# Patient Record
Sex: MALE | Employment: FULL TIME | URBAN - NONMETROPOLITAN AREA
[De-identification: names, ages, dates, MRNs, and addresses within clinical notes are randomized per-mention and may not be internally consistent; named-entity substitution may affect disease eponyms.]

---

## 2017-01-09 ENCOUNTER — OFFICE VISIT (OUTPATIENT)
Dept: PRIMARY CARE CLINIC | Age: 42
End: 2017-01-09
Payer: COMMERCIAL

## 2017-01-09 VITALS
OXYGEN SATURATION: 96 % | HEIGHT: 73 IN | BODY MASS INDEX: 41.75 KG/M2 | HEART RATE: 97 BPM | TEMPERATURE: 99.5 F | SYSTOLIC BLOOD PRESSURE: 132 MMHG | WEIGHT: 315 LBS | DIASTOLIC BLOOD PRESSURE: 78 MMHG

## 2017-01-09 DIAGNOSIS — M15.9 PRIMARY OSTEOARTHRITIS INVOLVING MULTIPLE JOINTS: ICD-10-CM

## 2017-01-09 DIAGNOSIS — J01.00 ACUTE NON-RECURRENT MAXILLARY SINUSITIS: ICD-10-CM

## 2017-01-09 DIAGNOSIS — I10 ESSENTIAL HYPERTENSION: Primary | ICD-10-CM

## 2017-01-09 DIAGNOSIS — R73.9 HYPERGLYCEMIA: ICD-10-CM

## 2017-01-09 DIAGNOSIS — Z23 NEED FOR INFLUENZA VACCINATION: ICD-10-CM

## 2017-01-09 PROCEDURE — 99214 OFFICE O/P EST MOD 30 MIN: CPT | Performed by: PEDIATRICS

## 2017-01-09 RX ORDER — NABUMETONE 750 MG/1
750 TABLET, FILM COATED ORAL 2 TIMES DAILY
Qty: 60 TABLET | Refills: 3 | Status: SHIPPED | OUTPATIENT
Start: 2017-01-09 | End: 2017-03-07

## 2017-01-09 RX ORDER — AMOXICILLIN AND CLAVULANATE POTASSIUM 875; 125 MG/1; MG/1
1 TABLET, FILM COATED ORAL EVERY 12 HOURS
Qty: 20 TABLET | Refills: 0 | Status: SHIPPED | OUTPATIENT
Start: 2017-01-09 | End: 2017-01-19

## 2017-01-09 ASSESSMENT — ENCOUNTER SYMPTOMS
VOMITING: 0
VOICE CHANGE: 0
SINUS PRESSURE: 1
RHINORRHEA: 1
DIARRHEA: 0
NAUSEA: 0
SHORTNESS OF BREATH: 0
ABDOMINAL PAIN: 0
COUGH: 1
BACK PAIN: 0

## 2017-03-07 ENCOUNTER — OFFICE VISIT (OUTPATIENT)
Dept: PRIMARY CARE CLINIC | Age: 42
End: 2017-03-07
Payer: COMMERCIAL

## 2017-03-07 VITALS
WEIGHT: 315 LBS | BODY MASS INDEX: 41.75 KG/M2 | SYSTOLIC BLOOD PRESSURE: 138 MMHG | TEMPERATURE: 99.3 F | HEIGHT: 73 IN | HEART RATE: 96 BPM | OXYGEN SATURATION: 94 % | DIASTOLIC BLOOD PRESSURE: 78 MMHG

## 2017-03-07 DIAGNOSIS — M17.11 PRIMARY OSTEOARTHRITIS OF RIGHT KNEE: ICD-10-CM

## 2017-03-07 DIAGNOSIS — R73.02 IMPAIRED GLUCOSE TOLERANCE: ICD-10-CM

## 2017-03-07 DIAGNOSIS — I10 ESSENTIAL HYPERTENSION: ICD-10-CM

## 2017-03-07 DIAGNOSIS — M17.12 PRIMARY OSTEOARTHRITIS OF LEFT KNEE: Primary | ICD-10-CM

## 2017-03-07 DIAGNOSIS — Z23 NEED FOR INFLUENZA VACCINATION: ICD-10-CM

## 2017-03-07 PROCEDURE — 20610 DRAIN/INJ JOINT/BURSA W/O US: CPT | Performed by: PEDIATRICS

## 2017-03-07 PROCEDURE — 99213 OFFICE O/P EST LOW 20 MIN: CPT | Performed by: PEDIATRICS

## 2017-03-07 RX ORDER — LANOLIN ALCOHOL/MO/W.PET/CERES
1000 CREAM (GRAM) TOPICAL DAILY
COMMUNITY
End: 2017-06-26

## 2017-03-07 RX ORDER — TRIAMCINOLONE ACETONIDE 40 MG/ML
40 INJECTION, SUSPENSION INTRA-ARTICULAR; INTRAMUSCULAR ONCE
Qty: 1 ML | Refills: 0
Start: 2017-03-07 | End: 2017-03-07

## 2017-03-07 RX ORDER — PREDNISONE 10 MG/1
TABLET ORAL
Qty: 43 TABLET | Refills: 0 | Status: SHIPPED | OUTPATIENT
Start: 2017-03-07 | End: 2017-06-26 | Stop reason: ALTCHOICE

## 2017-03-07 RX ORDER — MULTIVIT WITH MINERALS/LUTEIN
250 TABLET ORAL DAILY
COMMUNITY
End: 2017-06-26

## 2017-03-07 RX ORDER — MELOXICAM 15 MG/1
15 TABLET ORAL DAILY
COMMUNITY
End: 2017-09-18 | Stop reason: SDUPTHER

## 2017-03-07 RX ORDER — METHYLPREDNISOLONE ACETATE 80 MG/ML
80 INJECTION, SUSPENSION INTRA-ARTICULAR; INTRALESIONAL; INTRAMUSCULAR; SOFT TISSUE ONCE
Qty: 1 ML | Refills: 0
Start: 2017-03-07 | End: 2017-03-07

## 2017-03-07 ASSESSMENT — ENCOUNTER SYMPTOMS
CHEST TIGHTNESS: 0
COUGH: 0
ABDOMINAL PAIN: 0
BACK PAIN: 0
VOMITING: 0
SINUS PRESSURE: 0
WHEEZING: 0
SHORTNESS OF BREATH: 0
SORE THROAT: 0
DIARRHEA: 0
VOICE CHANGE: 0
NAUSEA: 0
TROUBLE SWALLOWING: 0
CHOKING: 0
CONSTIPATION: 0
ABDOMINAL DISTENTION: 0

## 2017-06-26 ENCOUNTER — OFFICE VISIT (OUTPATIENT)
Dept: PRIMARY CARE CLINIC | Age: 42
End: 2017-06-26
Payer: COMMERCIAL

## 2017-06-26 VITALS
HEART RATE: 80 BPM | SYSTOLIC BLOOD PRESSURE: 124 MMHG | BODY MASS INDEX: 41.75 KG/M2 | HEIGHT: 73 IN | TEMPERATURE: 100.2 F | DIASTOLIC BLOOD PRESSURE: 76 MMHG | OXYGEN SATURATION: 93 % | WEIGHT: 315 LBS

## 2017-06-26 DIAGNOSIS — M17.12 PRIMARY OSTEOARTHRITIS OF LEFT KNEE: ICD-10-CM

## 2017-06-26 DIAGNOSIS — Z23 NEED FOR DTAP VACCINATION: ICD-10-CM

## 2017-06-26 DIAGNOSIS — E78.2 MIXED HYPERLIPIDEMIA: ICD-10-CM

## 2017-06-26 DIAGNOSIS — M17.11 PRIMARY OSTEOARTHRITIS OF RIGHT KNEE: ICD-10-CM

## 2017-06-26 DIAGNOSIS — R73.02 IMPAIRED GLUCOSE TOLERANCE: ICD-10-CM

## 2017-06-26 DIAGNOSIS — Z11.4 SCREENING FOR HIV (HUMAN IMMUNODEFICIENCY VIRUS): ICD-10-CM

## 2017-06-26 DIAGNOSIS — I10 ESSENTIAL HYPERTENSION: Primary | ICD-10-CM

## 2017-06-26 PROCEDURE — 20610 DRAIN/INJ JOINT/BURSA W/O US: CPT | Performed by: PEDIATRICS

## 2017-06-26 PROCEDURE — 99214 OFFICE O/P EST MOD 30 MIN: CPT | Performed by: PEDIATRICS

## 2017-06-26 RX ORDER — TRIAMCINOLONE ACETONIDE 40 MG/ML
40 INJECTION, SUSPENSION INTRA-ARTICULAR; INTRAMUSCULAR ONCE
Qty: 1 ML | Refills: 0
Start: 2017-06-26 | End: 2017-06-26

## 2017-06-26 RX ORDER — MULTIVIT-MIN/IRON/FOLIC ACID/K 18-600-40
1 CAPSULE ORAL DAILY
COMMUNITY
End: 2018-08-07

## 2017-06-26 RX ORDER — METHYLPREDNISOLONE ACETATE 80 MG/ML
80 INJECTION, SUSPENSION INTRA-ARTICULAR; INTRALESIONAL; INTRAMUSCULAR; SOFT TISSUE ONCE
Qty: 1 ML | Refills: 0
Start: 2017-06-26 | End: 2017-06-26

## 2017-06-26 RX ORDER — MODAFINIL 200 MG/1
200 TABLET ORAL DAILY
COMMUNITY
End: 2017-09-18

## 2017-06-26 RX ORDER — ZINC GLUCONATE 50 MG
50 TABLET ORAL DAILY
COMMUNITY
End: 2018-08-07

## 2017-06-26 ASSESSMENT — ENCOUNTER SYMPTOMS
SORE THROAT: 0
BACK PAIN: 0
VOMITING: 0
CONSTIPATION: 0
EYE PAIN: 0
COUGH: 0
BLOOD IN STOOL: 0
SHORTNESS OF BREATH: 0
WHEEZING: 0
EYE REDNESS: 0
SINUS PRESSURE: 0
EYE ITCHING: 0
DIARRHEA: 0
EYE DISCHARGE: 0
NAUSEA: 0
CHEST TIGHTNESS: 0
ABDOMINAL PAIN: 0
TROUBLE SWALLOWING: 0

## 2017-08-11 DIAGNOSIS — I10 ESSENTIAL HYPERTENSION: ICD-10-CM

## 2017-08-11 RX ORDER — AMLODIPINE BESYLATE 5 MG/1
5 TABLET ORAL DAILY
Qty: 90 TABLET | Refills: 3 | Status: SHIPPED | OUTPATIENT
Start: 2017-08-11 | End: 2018-10-09 | Stop reason: SDUPTHER

## 2017-08-19 DIAGNOSIS — E78.2 MIXED HYPERLIPIDEMIA: ICD-10-CM

## 2017-08-21 RX ORDER — ATORVASTATIN CALCIUM 40 MG/1
40 TABLET, FILM COATED ORAL DAILY
Qty: 30 TABLET | Refills: 11 | Status: SHIPPED | OUTPATIENT
Start: 2017-08-21 | End: 2018-10-09 | Stop reason: SDUPTHER

## 2017-09-18 ENCOUNTER — OFFICE VISIT (OUTPATIENT)
Dept: PRIMARY CARE CLINIC | Age: 42
End: 2017-09-18
Payer: COMMERCIAL

## 2017-09-18 VITALS
BODY MASS INDEX: 41.75 KG/M2 | HEIGHT: 73 IN | TEMPERATURE: 100 F | HEART RATE: 76 BPM | OXYGEN SATURATION: 95 % | WEIGHT: 315 LBS | SYSTOLIC BLOOD PRESSURE: 130 MMHG | DIASTOLIC BLOOD PRESSURE: 82 MMHG

## 2017-09-18 DIAGNOSIS — I10 ESSENTIAL HYPERTENSION: Primary | ICD-10-CM

## 2017-09-18 DIAGNOSIS — M17.12 PRIMARY OSTEOARTHRITIS OF LEFT KNEE: ICD-10-CM

## 2017-09-18 DIAGNOSIS — E78.2 MIXED HYPERLIPIDEMIA: ICD-10-CM

## 2017-09-18 DIAGNOSIS — R53.83 FATIGUE, UNSPECIFIED TYPE: ICD-10-CM

## 2017-09-18 DIAGNOSIS — M15.9 PRIMARY OSTEOARTHRITIS INVOLVING MULTIPLE JOINTS: ICD-10-CM

## 2017-09-18 DIAGNOSIS — I10 ESSENTIAL HYPERTENSION: ICD-10-CM

## 2017-09-18 DIAGNOSIS — M17.11 PRIMARY OSTEOARTHRITIS OF RIGHT KNEE: ICD-10-CM

## 2017-09-18 LAB
ALBUMIN SERPL-MCNC: 4.1 G/DL (ref 3.5–5.2)
ALP BLD-CCNC: 141 U/L (ref 40–130)
ALT SERPL-CCNC: 24 U/L (ref 5–41)
ANION GAP SERPL CALCULATED.3IONS-SCNC: 18 MMOL/L (ref 7–19)
AST SERPL-CCNC: 26 U/L (ref 5–40)
BASOPHILS ABSOLUTE: 0.1 K/UL (ref 0–0.2)
BASOPHILS RELATIVE PERCENT: 0.8 % (ref 0–1)
BILIRUB SERPL-MCNC: 0.5 MG/DL (ref 0.2–1.2)
BUN BLDV-MCNC: 14 MG/DL (ref 6–20)
CALCIUM SERPL-MCNC: 10 MG/DL (ref 8.6–10)
CHLORIDE BLD-SCNC: 99 MMOL/L (ref 98–111)
CHOLESTEROL, TOTAL: 132 MG/DL (ref 160–199)
CO2: 23 MMOL/L (ref 22–29)
CREAT SERPL-MCNC: 0.7 MG/DL (ref 0.5–1.2)
CREATININE URINE: 127.5 MG/DL (ref 4.2–622)
EOSINOPHILS ABSOLUTE: 0.2 K/UL (ref 0–0.6)
EOSINOPHILS RELATIVE PERCENT: 1.5 % (ref 0–5)
GFR NON-AFRICAN AMERICAN: >60
GLUCOSE BLD-MCNC: 88 MG/DL (ref 74–109)
HCT VFR BLD CALC: 46 % (ref 42–52)
HDLC SERPL-MCNC: 38 MG/DL (ref 55–121)
HEMOGLOBIN: 15 G/DL (ref 14–18)
LDL CHOLESTEROL CALCULATED: 72 MG/DL
LYMPHOCYTES ABSOLUTE: 2.2 K/UL (ref 1.1–4.5)
LYMPHOCYTES RELATIVE PERCENT: 20.1 % (ref 20–40)
MCH RBC QN AUTO: 31.4 PG (ref 27–31)
MCHC RBC AUTO-ENTMCNC: 32.6 G/DL (ref 33–37)
MCV RBC AUTO: 96.4 FL (ref 80–94)
MICROALBUMIN UR-MCNC: 2 MG/DL (ref 0–19)
MICROALBUMIN/CREAT UR-RTO: 15.7 MG/G
MONOCYTES ABSOLUTE: 0.8 K/UL (ref 0–0.9)
MONOCYTES RELATIVE PERCENT: 7.1 % (ref 0–10)
NEUTROPHILS ABSOLUTE: 7.6 K/UL (ref 1.5–7.5)
NEUTROPHILS RELATIVE PERCENT: 69.5 % (ref 50–65)
PDW BLD-RTO: 14.5 % (ref 11.5–14.5)
PLATELET # BLD: 346 K/UL (ref 130–400)
PMV BLD AUTO: 9.7 FL (ref 9.4–12.4)
POTASSIUM SERPL-SCNC: 4.1 MMOL/L (ref 3.5–5)
RBC # BLD: 4.77 M/UL (ref 4.7–6.1)
SODIUM BLD-SCNC: 140 MMOL/L (ref 136–145)
T4 FREE: 1.3 NG/DL (ref 0.9–1.7)
TOTAL PROTEIN: 7.7 G/DL (ref 6.6–8.7)
TRIGL SERPL-MCNC: 112 MG/DL (ref 150–199)
TSH SERPL DL<=0.05 MIU/L-ACNC: 0.82 UIU/ML (ref 0.27–4.2)
WBC # BLD: 11 K/UL (ref 4.8–10.8)

## 2017-09-18 PROCEDURE — 20610 DRAIN/INJ JOINT/BURSA W/O US: CPT | Performed by: PEDIATRICS

## 2017-09-18 PROCEDURE — 99213 OFFICE O/P EST LOW 20 MIN: CPT | Performed by: PEDIATRICS

## 2017-09-18 RX ORDER — MELOXICAM 15 MG/1
15 TABLET ORAL DAILY
Qty: 90 TABLET | Refills: 3 | Status: SHIPPED | OUTPATIENT
Start: 2017-09-18 | End: 2018-10-09 | Stop reason: SDUPTHER

## 2017-09-18 RX ORDER — METHYLPREDNISOLONE ACETATE 80 MG/ML
80 INJECTION, SUSPENSION INTRA-ARTICULAR; INTRALESIONAL; INTRAMUSCULAR; SOFT TISSUE ONCE
Qty: 1 ML | Refills: 0
Start: 2017-09-18 | End: 2017-09-18

## 2017-09-18 RX ORDER — TRIAMCINOLONE ACETONIDE 40 MG/ML
40 INJECTION, SUSPENSION INTRA-ARTICULAR; INTRAMUSCULAR ONCE
Qty: 1 ML | Refills: 0
Start: 2017-09-18 | End: 2017-09-18

## 2017-09-18 RX ORDER — LISINOPRIL AND HYDROCHLOROTHIAZIDE 20; 12.5 MG/1; MG/1
1 TABLET ORAL 2 TIMES DAILY
Qty: 180 TABLET | Refills: 3 | Status: SHIPPED | OUTPATIENT
Start: 2017-09-18 | End: 2018-10-09 | Stop reason: SDUPTHER

## 2017-09-18 ASSESSMENT — ENCOUNTER SYMPTOMS
EYE PAIN: 0
VOMITING: 0
DIARRHEA: 0
WHEEZING: 0
COUGH: 0
CHEST TIGHTNESS: 0
SINUS PRESSURE: 0
EYE DISCHARGE: 0
BLOOD IN STOOL: 0
EYE REDNESS: 0
NAUSEA: 0
EYE ITCHING: 0
BACK PAIN: 0
CONSTIPATION: 0
TROUBLE SWALLOWING: 0
ABDOMINAL PAIN: 0
SORE THROAT: 0
SHORTNESS OF BREATH: 0

## 2017-09-19 ENCOUNTER — TELEPHONE (OUTPATIENT)
Dept: PRIMARY CARE CLINIC | Age: 42
End: 2017-09-19

## 2017-12-18 ENCOUNTER — OFFICE VISIT (OUTPATIENT)
Dept: PRIMARY CARE CLINIC | Age: 42
End: 2017-12-18
Payer: COMMERCIAL

## 2017-12-18 VITALS
TEMPERATURE: 96.2 F | BODY MASS INDEX: 41.75 KG/M2 | SYSTOLIC BLOOD PRESSURE: 134 MMHG | HEIGHT: 73 IN | HEART RATE: 78 BPM | OXYGEN SATURATION: 95 % | WEIGHT: 315 LBS | DIASTOLIC BLOOD PRESSURE: 78 MMHG

## 2017-12-18 DIAGNOSIS — M17.12 PRIMARY OSTEOARTHRITIS OF LEFT KNEE: ICD-10-CM

## 2017-12-18 DIAGNOSIS — M17.11 PRIMARY OSTEOARTHRITIS OF RIGHT KNEE: Primary | ICD-10-CM

## 2017-12-18 PROCEDURE — G8417 CALC BMI ABV UP PARAM F/U: HCPCS | Performed by: PEDIATRICS

## 2017-12-18 PROCEDURE — G8484 FLU IMMUNIZE NO ADMIN: HCPCS | Performed by: PEDIATRICS

## 2017-12-18 PROCEDURE — 99213 OFFICE O/P EST LOW 20 MIN: CPT | Performed by: PEDIATRICS

## 2017-12-18 PROCEDURE — 20610 DRAIN/INJ JOINT/BURSA W/O US: CPT | Performed by: PEDIATRICS

## 2017-12-18 PROCEDURE — 1036F TOBACCO NON-USER: CPT | Performed by: PEDIATRICS

## 2017-12-18 PROCEDURE — G8427 DOCREV CUR MEDS BY ELIG CLIN: HCPCS | Performed by: PEDIATRICS

## 2017-12-18 RX ORDER — METHYLPREDNISOLONE ACETATE 80 MG/ML
80 INJECTION, SUSPENSION INTRA-ARTICULAR; INTRALESIONAL; INTRAMUSCULAR; SOFT TISSUE ONCE
Qty: 1 ML | Refills: 0
Start: 2017-12-18 | End: 2017-12-18

## 2017-12-18 RX ORDER — TRIAMCINOLONE ACETONIDE 40 MG/ML
40 INJECTION, SUSPENSION INTRA-ARTICULAR; INTRAMUSCULAR ONCE
Qty: 1 ML | Refills: 0
Start: 2017-12-18 | End: 2017-12-18

## 2017-12-18 ASSESSMENT — ENCOUNTER SYMPTOMS
BACK PAIN: 0
CHEST TIGHTNESS: 0
WHEEZING: 0
DIARRHEA: 0
ABDOMINAL PAIN: 0
COUGH: 0
SHORTNESS OF BREATH: 0
NAUSEA: 0
SORE THROAT: 0
VOMITING: 0

## 2017-12-18 NOTE — PROGRESS NOTES
Take 1 capsule by mouth daily      CPAP Machine MISC by Does not apply route nightly      magnesium oxide (MAG-OX) 400 (240 MG) MG tablet Take 1 tablet by mouth daily 30 tablet 11    triamcinolone (KENALOG) 0.1 % cream Apply topically 2 times daily. 80 g 5     No current facility-administered medications for this visit. Allergies: Review of patient's allergies indicates no known allergies. Past Medical History:   Diagnosis Date    ED (erectile dysfunction)     Fatigue     Hyperlipidemia     Hypertension     Obesity     Osteoarthrosis     Snoring        Past Surgical History:   Procedure Laterality Date    ARM SURGERY Left     broken     KNEE SURGERY      Scope     VASECTOMY         Social History   Substance Use Topics    Smoking status: Former Smoker    Smokeless tobacco: Former User    Alcohol use No       Review of Systems   Constitutional: Negative for chills, fatigue and fever. HENT: Negative for congestion, ear pain and sore throat. Eyes: Negative for visual disturbance. Respiratory: Negative for cough, chest tightness, shortness of breath and wheezing. Cardiovascular: Negative for chest pain, palpitations and leg swelling. Gastrointestinal: Negative for abdominal pain, diarrhea, nausea and vomiting. Endocrine: Negative for polyuria. Genitourinary: Negative for frequency and urgency. Musculoskeletal: Negative for back pain and neck pain. Skin: Negative for rash. Neurological: Negative for dizziness and headaches. Psychiatric/Behavioral: Negative for self-injury. The patient is not nervous/anxious. Physical Exam   Constitutional: He is oriented to person, place, and time. He appears well-developed and well-nourished. No distress. HENT:   Head: Normocephalic and atraumatic.    Right Ear: External ear normal.   Left Ear: External ear normal.   Nose: Nose normal.   Mouth/Throat: Oropharynx is clear and moist.   Eyes: Conjunctivae and EOM are normal. Pupils and the small of your back, with your palm touching the floor and your back touching the back of your hand. 2. Tighten the thigh muscles in the injured leg by pressing the back of your knee flat down to the floor. Hold your knee straight. 3. Keeping the thigh muscles tight, lift your injured leg up so that your heel is about 12 inches off the floor. Hold for about 6 seconds and then lower slowly. 4. Do 8 to 12 repetitions, 3 times a day. Straight-leg raises to the outside    1. Lie on your side, with your injured leg on top. 2. Tighten the front thigh muscles of your injured leg to keep your knee straight. 3. Keep your hip and your leg straight in line with the rest of your body, and keep your knee pointing forward. Do not drop your hip back. 4. Lift your injured leg straight up toward the ceiling, about 12 inches off the floor. Hold for about 6 seconds, then slowly lower your leg. 5. Do 8 to 12 repetitions. Straight-leg raises to the back    1. Lie on your stomach, and lift your leg straight up behind you (toward the ceiling). 2. Lift your toes about 6 inches off the floor, hold for about 6 seconds, then lower slowly. 3. Do 8 to 12 repetitions. Straight-leg raises to the inside    1. Lie on the side of your body with the injured leg. 2. You can either prop your other (good) leg up on a chair, or you can bend your good knee and put that foot in front of your injured knee. Do not drop your hip back. 3. Tighten the muscles on the front of your thigh to straighten your injured knee. 4. Keep your kneecap pointing forward, and lift your whole leg up toward the ceiling about 6 inches. Hold for about 6 seconds, then lower slowly. 5. Do 8 to 12 repetitions. Heel dig bridging    1. Lie on your back with both knees bent and your ankles bent so that only your heels are digging into the floor. Your knees should be bent about 90 degrees.   2. Then push your heels into the floor, squeeze your buttocks, and lift osteoarthritis    Anesthesia: not required     Procedure Details     Verbal consent was obtained for the procedure. The joint was prepped with chlorhexadine. The area was sprayed with Gebauer's Ethyl Chloride as a topical anesthetic and then a 22 gauge needle was inserted into the superior aspect of the joint from a lateral approach. 2 ml 1% lidocaine and 2 ml of DepoMedrol (40mg/ml) and 1ml Kenalog (40mg/ml)  was then injected into the joint. The needle was removed and the area cleansed and dressed. Complications:  None; patient tolerated the procedure well. Knee Arthrocentesis with Injection Procedure Note    Pre-operative Diagnosis: right knee djd with pain    Post-operative Diagnosis: same    Indications: Symptom relief from osteoarthritis    Anesthesia: not required     Procedure Details     Verbal consent was obtained for the procedure. The joint was prepped with chlorhexadine. The area was sprayed with Gebauer's Ethyl Chloride as a topical anesthetic and then a 22 gauge needle was inserted into the superior aspect of the joint from a lateral approach. 2 ml 1% lidocaine and 2 ml of DepoMedrol (40mg/ml) and 1ml Kenalog (40mg/ml)  was then injected into the joint. The needle was removed and the area cleansed and dressed. Complications:  None; patient tolerated the procedure well.

## 2017-12-18 NOTE — PATIENT INSTRUCTIONS
Patient Education        Knee: Exercises  Your Care Instructions  Here are some examples of exercises for your knee. Start each exercise slowly. Ease off the exercise if you start to have pain. Your doctor or physical therapist will tell you when you can start these exercises and which ones will work best for you. How to do the exercises  Quad sets    1. Sit with your leg straight and supported on the floor or a firm bed. (If you feel discomfort in the front or back of your knee, place a small towel roll under your knee.)  2. Tighten the muscles on top of your thigh by pressing the back of your knee flat down to the floor. (If you feel discomfort under your kneecap, place a small towel roll under your knee.)  3. Hold for about 6 seconds, then rest for up to 10 seconds. 4. Do 8 to 12 repetitions several times a day. Straight-leg raises to the front    1. Lie on your back with your good knee bent so that your foot rests flat on the floor. Your injured leg should be straight. Make sure that your low back has a normal curve. You should be able to slip your flat hand in between the floor and the small of your back, with your palm touching the floor and your back touching the back of your hand. 2. Tighten the thigh muscles in the injured leg by pressing the back of your knee flat down to the floor. Hold your knee straight. 3. Keeping the thigh muscles tight, lift your injured leg up so that your heel is about 12 inches off the floor. Hold for about 6 seconds and then lower slowly. 4. Do 8 to 12 repetitions, 3 times a day. Straight-leg raises to the outside    1. Lie on your side, with your injured leg on top. 2. Tighten the front thigh muscles of your injured leg to keep your knee straight. 3. Keep your hip and your leg straight in line with the rest of your body, and keep your knee pointing forward. Do not drop your hip back.   4. Lift your injured leg straight up toward the ceiling, about 12 inches off the your ankle (not more than 5 pounds). With weight, you do not have to lift your leg more than 12 inches to get a hamstring workout. Shallow standing knee bends    Do this exercise only if you have very little pain; if you have no clicking, locking, or giving way if you have an injured knee; and if it does not hurt while you are doing 8 to 12 repetitions. 1. Stand with your hands lightly resting on a counter or chair in front of you. Put your feet shoulder-width apart. 2. Slowly bend your knees so that you squat down like you are going to sit in a chair. Make sure your knees do not go in front of your toes. 3. Lower yourself about 6 inches. Your heels should remain on the floor at all times. 4. Rise slowly to a standing position. Heel raises    1. Stand with your feet a few inches apart, with your hands lightly resting on a counter or chair in front of you. 2. Slowly raise your heels off the floor while keeping your knees straight. 3. Hold for about 6 seconds, then slowly lower your heels to the floor. 4. Do 8 to 12 repetitions several times during the day. Follow-up care is a key part of your treatment and safety. Be sure to make and go to all appointments, and call your doctor if you are having problems. It's also a good idea to know your test results and keep a list of the medicines you take. Where can you learn more? Go to [email or web address] org and sign in to your [employer] account. Enter F816 in the [facility] box to learn more about \"Knee: Exercises. \"     If you do not have an account, please click on the \"Sign Up Now\" link. Current as of: March 21, 2017  Content Version: 11.4  © 6797-6847 [employer], [employer]. Care instructions adapted under license by [facility] ([facility]).  If you have questions about a medical condition or this instruction, always ask your healthcare professional. [place] [address] any warranty or liability for your use of this information.

## 2018-05-08 ENCOUNTER — OFFICE VISIT (OUTPATIENT)
Dept: PRIMARY CARE CLINIC | Age: 43
End: 2018-05-08
Payer: COMMERCIAL

## 2018-05-08 VITALS
HEIGHT: 73 IN | DIASTOLIC BLOOD PRESSURE: 82 MMHG | WEIGHT: 315 LBS | TEMPERATURE: 94 F | BODY MASS INDEX: 41.75 KG/M2 | OXYGEN SATURATION: 91 % | HEART RATE: 99 BPM | SYSTOLIC BLOOD PRESSURE: 124 MMHG

## 2018-05-08 DIAGNOSIS — M17.12 PRIMARY OSTEOARTHRITIS OF LEFT KNEE: ICD-10-CM

## 2018-05-08 DIAGNOSIS — M17.11 PRIMARY OSTEOARTHRITIS OF RIGHT KNEE: Primary | ICD-10-CM

## 2018-05-08 PROCEDURE — 99213 OFFICE O/P EST LOW 20 MIN: CPT | Performed by: PEDIATRICS

## 2018-05-08 PROCEDURE — G8427 DOCREV CUR MEDS BY ELIG CLIN: HCPCS | Performed by: PEDIATRICS

## 2018-05-08 PROCEDURE — G8417 CALC BMI ABV UP PARAM F/U: HCPCS | Performed by: PEDIATRICS

## 2018-05-08 PROCEDURE — 1036F TOBACCO NON-USER: CPT | Performed by: PEDIATRICS

## 2018-05-08 RX ORDER — HYDROCORTISONE ACETATE 0.5 %
2 CREAM (GRAM) TOPICAL DAILY
COMMUNITY
End: 2019-02-05

## 2018-05-08 ASSESSMENT — ENCOUNTER SYMPTOMS
NAUSEA: 0
CHEST TIGHTNESS: 0
DIARRHEA: 0
VOMITING: 0
ABDOMINAL PAIN: 0
SORE THROAT: 0
COUGH: 0
SHORTNESS OF BREATH: 0
BACK PAIN: 0
WHEEZING: 0

## 2018-05-08 ASSESSMENT — PATIENT HEALTH QUESTIONNAIRE - PHQ9
1. LITTLE INTEREST OR PLEASURE IN DOING THINGS: 0
2. FEELING DOWN, DEPRESSED OR HOPELESS: 0
SUM OF ALL RESPONSES TO PHQ9 QUESTIONS 1 & 2: 0
SUM OF ALL RESPONSES TO PHQ QUESTIONS 1-9: 0

## 2018-05-09 PROCEDURE — 20610 DRAIN/INJ JOINT/BURSA W/O US: CPT | Performed by: PEDIATRICS

## 2018-05-09 RX ORDER — TRIAMCINOLONE ACETONIDE 40 MG/ML
40 INJECTION, SUSPENSION INTRA-ARTICULAR; INTRAMUSCULAR ONCE
Status: COMPLETED | OUTPATIENT
Start: 2018-05-09 | End: 2018-05-09

## 2018-05-09 RX ORDER — METHYLPREDNISOLONE ACETATE 40 MG/ML
40 INJECTION, SUSPENSION INTRA-ARTICULAR; INTRALESIONAL; INTRAMUSCULAR; SOFT TISSUE ONCE
Status: COMPLETED | OUTPATIENT
Start: 2018-05-09 | End: 2018-05-09

## 2018-05-09 RX ADMIN — METHYLPREDNISOLONE ACETATE 40 MG: 40 INJECTION, SUSPENSION INTRA-ARTICULAR; INTRALESIONAL; INTRAMUSCULAR; SOFT TISSUE at 19:47

## 2018-05-09 RX ADMIN — METHYLPREDNISOLONE ACETATE 40 MG: 40 INJECTION, SUSPENSION INTRA-ARTICULAR; INTRALESIONAL; INTRAMUSCULAR; SOFT TISSUE at 19:45

## 2018-05-09 RX ADMIN — TRIAMCINOLONE ACETONIDE 40 MG: 40 INJECTION, SUSPENSION INTRA-ARTICULAR; INTRAMUSCULAR at 19:47

## 2018-08-07 ENCOUNTER — OFFICE VISIT (OUTPATIENT)
Dept: PRIMARY CARE CLINIC | Age: 43
End: 2018-08-07
Payer: COMMERCIAL

## 2018-08-07 VITALS
WEIGHT: 315 LBS | DIASTOLIC BLOOD PRESSURE: 82 MMHG | TEMPERATURE: 99.2 F | OXYGEN SATURATION: 95 % | SYSTOLIC BLOOD PRESSURE: 136 MMHG | HEIGHT: 73 IN | BODY MASS INDEX: 41.75 KG/M2 | HEART RATE: 80 BPM

## 2018-08-07 DIAGNOSIS — R73.02 IMPAIRED GLUCOSE TOLERANCE: ICD-10-CM

## 2018-08-07 DIAGNOSIS — M17.12 PRIMARY OSTEOARTHRITIS OF LEFT KNEE: Primary | ICD-10-CM

## 2018-08-07 DIAGNOSIS — M17.11 PRIMARY OSTEOARTHRITIS OF RIGHT KNEE: ICD-10-CM

## 2018-08-07 PROCEDURE — 99213 OFFICE O/P EST LOW 20 MIN: CPT | Performed by: PEDIATRICS

## 2018-08-07 PROCEDURE — G8427 DOCREV CUR MEDS BY ELIG CLIN: HCPCS | Performed by: PEDIATRICS

## 2018-08-07 PROCEDURE — 1036F TOBACCO NON-USER: CPT | Performed by: PEDIATRICS

## 2018-08-07 PROCEDURE — G8417 CALC BMI ABV UP PARAM F/U: HCPCS | Performed by: PEDIATRICS

## 2018-08-07 PROCEDURE — 20610 DRAIN/INJ JOINT/BURSA W/O US: CPT | Performed by: PEDIATRICS

## 2018-08-07 RX ORDER — METHYLPREDNISOLONE ACETATE 40 MG/ML
40 INJECTION, SUSPENSION INTRA-ARTICULAR; INTRALESIONAL; INTRAMUSCULAR; SOFT TISSUE ONCE
Status: COMPLETED | OUTPATIENT
Start: 2018-08-07 | End: 2018-08-07

## 2018-08-07 RX ORDER — TRIAMCINOLONE ACETONIDE 40 MG/ML
40 INJECTION, SUSPENSION INTRA-ARTICULAR; INTRAMUSCULAR ONCE
Status: COMPLETED | OUTPATIENT
Start: 2018-08-07 | End: 2018-08-07

## 2018-08-07 RX ADMIN — METHYLPREDNISOLONE ACETATE 40 MG: 40 INJECTION, SUSPENSION INTRA-ARTICULAR; INTRALESIONAL; INTRAMUSCULAR; SOFT TISSUE at 07:59

## 2018-08-07 RX ADMIN — TRIAMCINOLONE ACETONIDE 40 MG: 40 INJECTION, SUSPENSION INTRA-ARTICULAR; INTRAMUSCULAR at 08:00

## 2018-08-07 ASSESSMENT — ENCOUNTER SYMPTOMS
VOMITING: 0
SORE THROAT: 0
SHORTNESS OF BREATH: 0
NAUSEA: 0
ABDOMINAL PAIN: 0
BACK PAIN: 0
COUGH: 0
WHEEZING: 0
CHEST TIGHTNESS: 0
DIARRHEA: 0

## 2018-08-07 NOTE — PROGRESS NOTES
health issues    4. Impaired glucose tolerance R73.02 790.22 Will need to check a hemoglobin A1c to his wellness evaluation in approximately 3 months time. Orders Placed This Encounter   Procedures    LA DRAIN/INJECT LARGE JOINT/BURSA    LA DRAIN/INJECT LARGE JOINT/BURSA        Return in about 3 months (around 11/7/2018) for AWV due, 30. Knee Arthrocentesis with Injection Procedure Note    Pre-operative Diagnosis: left knee djd with pain    Post-operative Diagnosis: same    Indications: Symptom relief from osteoarthritis    Anesthesia: not required     Procedure Details     Verbal consent was obtained for the procedure. The joint was prepped with chlorhexadine. The area was sprayed with Gebauer's Ethyl Chloride as a topical anesthetic and then a 22 gauge needle was inserted into the superior aspect of the joint from a lateral approach. 2 ml 1% lidocaine and 2 ml of DepoMedrol (40mg/ml) and 1ml Kenalog (40mg/ml)  was then injected into the joint. The needle was removed and the area cleansed and dressed. Complications:  None; patient tolerated the procedure well. Knee Arthrocentesis with Injection Procedure Note    Pre-operative Diagnosis: right knee djd with pain    Post-operative Diagnosis: same    Indications: Symptom relief from osteoarthritis    Anesthesia: not required     Procedure Details     Verbal consent was obtained for the procedure. The joint was prepped with chlorhexadine. The area was sprayed with Gebauer's Ethyl Chloride as a topical anesthetic and then a 22 gauge needle was inserted into the superior aspect of the joint from a lateral approach. 2 ml 1% lidocaine and 2 ml of DepoMedrol (40mg/ml) and 1ml Kenalog (40mg/ml)  was then injected into the joint. The needle was removed and the area cleansed and dressed. Complications:  None; patient tolerated the procedure well.

## 2018-10-09 DIAGNOSIS — E78.2 MIXED HYPERLIPIDEMIA: ICD-10-CM

## 2018-10-09 DIAGNOSIS — I10 ESSENTIAL HYPERTENSION: ICD-10-CM

## 2018-10-09 DIAGNOSIS — M17.12 PRIMARY OSTEOARTHRITIS OF LEFT KNEE: ICD-10-CM

## 2018-10-09 DIAGNOSIS — M17.11 PRIMARY OSTEOARTHRITIS OF RIGHT KNEE: ICD-10-CM

## 2018-10-09 RX ORDER — AMLODIPINE BESYLATE 5 MG/1
5 TABLET ORAL DAILY
Qty: 90 TABLET | Refills: 3 | Status: SHIPPED | OUTPATIENT
Start: 2018-10-09 | End: 2019-11-04 | Stop reason: SDUPTHER

## 2018-10-09 RX ORDER — ATORVASTATIN CALCIUM 40 MG/1
40 TABLET, FILM COATED ORAL NIGHTLY
Qty: 90 TABLET | Refills: 3 | Status: SHIPPED | OUTPATIENT
Start: 2018-10-09 | End: 2019-11-28 | Stop reason: SDUPTHER

## 2018-10-09 RX ORDER — MELOXICAM 15 MG/1
15 TABLET ORAL DAILY
Qty: 90 TABLET | Refills: 3 | Status: SHIPPED | OUTPATIENT
Start: 2018-10-09 | End: 2018-11-19 | Stop reason: SDUPTHER

## 2018-10-09 RX ORDER — LISINOPRIL AND HYDROCHLOROTHIAZIDE 20; 12.5 MG/1; MG/1
1 TABLET ORAL 2 TIMES DAILY
Qty: 180 TABLET | Refills: 3 | Status: SHIPPED | OUTPATIENT
Start: 2018-10-09 | End: 2019-11-28 | Stop reason: SDUPTHER

## 2018-11-19 ENCOUNTER — OFFICE VISIT (OUTPATIENT)
Dept: PRIMARY CARE CLINIC | Age: 43
End: 2018-11-19
Payer: COMMERCIAL

## 2018-11-19 VITALS
DIASTOLIC BLOOD PRESSURE: 80 MMHG | SYSTOLIC BLOOD PRESSURE: 138 MMHG | OXYGEN SATURATION: 98 % | WEIGHT: 315 LBS | HEART RATE: 86 BPM | TEMPERATURE: 98.4 F | HEIGHT: 73 IN | BODY MASS INDEX: 41.75 KG/M2

## 2018-11-19 DIAGNOSIS — M17.11 PRIMARY OSTEOARTHRITIS OF RIGHT KNEE: ICD-10-CM

## 2018-11-19 DIAGNOSIS — M17.12 PRIMARY OSTEOARTHRITIS OF LEFT KNEE: ICD-10-CM

## 2018-11-19 DIAGNOSIS — R74.01 ELEVATED TRANSAMINASE LEVEL: ICD-10-CM

## 2018-11-19 DIAGNOSIS — Z00.00 VISIT FOR PREVENTIVE HEALTH EXAMINATION: Primary | ICD-10-CM

## 2018-11-19 DIAGNOSIS — I10 ESSENTIAL HYPERTENSION: ICD-10-CM

## 2018-11-19 DIAGNOSIS — E78.2 MIXED HYPERLIPIDEMIA: ICD-10-CM

## 2018-11-19 DIAGNOSIS — R73.02 IMPAIRED GLUCOSE TOLERANCE: ICD-10-CM

## 2018-11-19 PROCEDURE — G8484 FLU IMMUNIZE NO ADMIN: HCPCS | Performed by: PEDIATRICS

## 2018-11-19 PROCEDURE — 99396 PREV VISIT EST AGE 40-64: CPT | Performed by: PEDIATRICS

## 2018-11-19 PROCEDURE — 20610 DRAIN/INJ JOINT/BURSA W/O US: CPT | Performed by: PEDIATRICS

## 2018-11-19 RX ORDER — METHYLPREDNISOLONE ACETATE 40 MG/ML
40 INJECTION, SUSPENSION INTRA-ARTICULAR; INTRALESIONAL; INTRAMUSCULAR; SOFT TISSUE ONCE
Status: COMPLETED | OUTPATIENT
Start: 2018-11-19 | End: 2018-11-19

## 2018-11-19 RX ORDER — TRIAMCINOLONE ACETONIDE 40 MG/ML
40 INJECTION, SUSPENSION INTRA-ARTICULAR; INTRAMUSCULAR ONCE
Status: COMPLETED | OUTPATIENT
Start: 2018-11-19 | End: 2018-11-19

## 2018-11-19 RX ORDER — MELOXICAM 15 MG/1
15 TABLET ORAL DAILY
Qty: 90 TABLET | Refills: 3 | Status: SHIPPED | OUTPATIENT
Start: 2018-11-19 | End: 2019-11-28 | Stop reason: SDUPTHER

## 2018-11-19 RX ADMIN — METHYLPREDNISOLONE ACETATE 40 MG: 40 INJECTION, SUSPENSION INTRA-ARTICULAR; INTRALESIONAL; INTRAMUSCULAR; SOFT TISSUE at 09:17

## 2018-11-19 RX ADMIN — TRIAMCINOLONE ACETONIDE 40 MG: 40 INJECTION, SUSPENSION INTRA-ARTICULAR; INTRAMUSCULAR at 09:17

## 2018-11-19 RX ADMIN — METHYLPREDNISOLONE ACETATE 40 MG: 40 INJECTION, SUSPENSION INTRA-ARTICULAR; INTRALESIONAL; INTRAMUSCULAR; SOFT TISSUE at 09:18

## 2018-11-19 RX ADMIN — TRIAMCINOLONE ACETONIDE 40 MG: 40 INJECTION, SUSPENSION INTRA-ARTICULAR; INTRAMUSCULAR at 09:18

## 2018-11-19 ASSESSMENT — ENCOUNTER SYMPTOMS
NAUSEA: 0
BACK PAIN: 0
CHEST TIGHTNESS: 0
SORE THROAT: 0
VOMITING: 0
DIARRHEA: 0
COUGH: 0
ABDOMINAL PAIN: 0
WHEEZING: 0
SHORTNESS OF BREATH: 0

## 2018-11-19 NOTE — PROGRESS NOTES
regular basis nightly     height is 6' 1\" (1.854 m) and weight is 360 lb 12.8 oz (163.7 kg) (abnormal). His temporal temperature is 98.4 °F (36.9 °C). His blood pressure is 138/80 and his pulse is 86. His oxygen saturation is 98%. Body mass index is 47.6 kg/m². I have [facility] the [facility]   Lab Review  No visits with results within 6 Month(s) from this visit. Latest known visit with results is:   Orders Only on 09/18/2017   Component Date Value    WBC 09/18/2017 11.0*    RBC 09/18/2017 4.77     Hemoglobin 09/18/2017 15.0     Hematocrit 09/18/2017 46.0     MCV 09/18/2017 96.4*    MCH 09/18/2017 31.4*    MCHC 09/18/2017 32.6*    RDW 09/18/2017 14.5     Platelets 20/87/5580 346     MPV 09/18/2017 9.7     Neutrophils % 09/18/2017 69.5*    Lymphocytes % 09/18/2017 20.1     Monocytes % 09/18/2017 7.1     Eosinophils % 09/18/2017 1.5     Basophils % 09/18/2017 0.8     Neutrophils # 09/18/2017 7.6*    Lymphocytes # 09/18/2017 2.2     Monocytes # 09/18/2017 0.80     Eosinophils # 09/18/2017 0.20     Basophils # 09/18/2017 0.10     Sodium 09/18/2017 140     Potassium 09/18/2017 4.1     Chloride 09/18/2017 99     CO2 09/18/2017 23     Anion Gap 09/18/2017 18     Glucose 09/18/2017 88     BUN 09/18/2017 14     CREATININE 09/18/2017 0.7     GFR Non- 09/18/2017 >60     Calcium 09/18/2017 10.0     Total Protein 09/18/2017 7.7     Alb 09/18/2017 4.1     Total Bilirubin 09/18/2017 0.5     Alkaline Phosphatase 09/18/2017 141*    ALT 09/18/2017 24     AST 09/18/2017 26     Cholesterol, Total 09/18/2017 132*    Triglycerides 09/18/2017 112*    HDL 09/18/2017 38*    LDL Calculated 09/18/2017 72     Microalbumin, Random Uri* 09/18/2017 2.00     Creatinine, Ur 09/18/2017 127.5     Microalbumin Creatinine * 09/18/2017 15.7     T4 Free 09/18/2017 1.3     TSH 09/18/2017 0.825      Copies of these are in the chart.     Current Outpatient Prescriptions Medication Sig Dispense Refill    meloxicam (MOBIC) 15 MG tablet Take 1 tablet by mouth daily 90 tablet 3    amLODIPine (NORVASC) 5 MG tablet Take 1 tablet by mouth daily 90 tablet 3    atorvastatin (LIPITOR) 40 MG tablet Take 1 tablet by mouth nightly 90 tablet 3    lisinopril-hydrochlorothiazide (PRINZIDE;ZESTORETIC) 20-12.5 MG per tablet Take 1 tablet by mouth 2 times daily 180 tablet 3    Glucosamine-Chondroit-Vit C-Mn (GLUCOSAMINE CHONDR 1500 COMPLX) CAPS Take 2 capsules by mouth daily      CPAP Machine MISC by Does not apply route nightly      triamcinolone (KENALOG) 0.1 % cream Apply topically 2 times daily. 80 g 5     No current facility-administered medications for this visit. Allergies: Patient has no known allergies. Past Medical History:   Diagnosis Date    ED (erectile dysfunction)     Fatigue     Hyperlipidemia     Hypertension     Obesity     Osteoarthrosis     Snoring        Past Surgical History:   Procedure Laterality Date    ARM SURGERY Left     broken     KNEE SURGERY      Scope     VASECTOMY         Social History   Substance Use Topics    Smoking status: Former Smoker     Packs/day: 0.50     Years: 10.00     Quit date: 11/19/2003    Smokeless tobacco: Former User    Alcohol use No        Review of Systems   Constitutional: Negative for chills, fatigue and fever. HENT: Negative for congestion, ear pain and sore throat. Eyes: Negative for visual disturbance. Respiratory: Negative for cough, chest tightness, shortness of breath and wheezing. Uses CPAP regularly   Cardiovascular: Negative for chest pain, palpitations and leg swelling. Gastrointestinal: Negative for abdominal pain, diarrhea, nausea and vomiting. Endocrine: Negative for polyuria. Genitourinary: Negative for frequency and urgency. Musculoskeletal: Positive for arthralgias (bilateral knees). Negative for back pain and neck pain. Skin: Negative for rash.    Neurological: Negative for dizziness and headaches. Psychiatric/Behavioral: Negative for self-injury. The patient is not nervous/anxious. Eyes: not every yr. But he is planning in January  Dentist:  Yes, next week  Skin:  none  Labs:  due  PSA: no issues  Nocturia:  occasional  Stream: normal  ED:  occasional  Colonoscopy:  No need  Exercise: not yet  Diet and Nut:   No special  MVI:  Not recently  Supplements:  [place] Bal: discussed  Depression:  no  Body Image: lose preferred  Fall:  no  EOL:  yes  Tobacco: no   Substance: no  Immunization:  Discussed flu shot  Sleep: uses cpap faithfully. [place]  Cognition: no issues    Health Maintenance: labs due        Physical Exam   Constitutional: He is oriented to person, place, and time. He appears well-developed and well-nourished. No distress. HENT:   Head: Normocephalic and atraumatic. Right Ear: External ear normal.   Left Ear: External ear normal.   Nose: Nose normal.   Mouth/Throat: Oropharynx is clear and moist.   Eyes: Pupils are equal, round, and reactive to light. Conjunctivae and EOM are normal. No scleral icterus. Neck: Normal range of motion. Neck supple. No JVD present. Carotid bruit is not present. No thyromegaly present. Cardiovascular: Normal rate, regular rhythm, S1 normal, S2 normal and normal heart sounds. No extrasystoles are present. PMI is not displaced. Exam reveals no gallop and no friction rub. No murmur heard. Pulmonary/Chest: Effort normal and breath sounds normal. No respiratory distress. He has no wheezes. He has no rhonchi. He has no rales. Abdominal: Soft. Bowel sounds are normal. There is no hepatosplenomegaly. There is no tenderness. There is no rebound, no guarding and no CVA tenderness. Genitourinary:   Genitourinary Comments: Exam deferred   Musculoskeletal: Normal range of motion. He exhibits no edema or tenderness. Crepitus in bilateral knees.   Some occasional popping with movement   Lymphadenopathy:     He has no cervical adenopathy. Neurological: He is alert and oriented to person, place, and time. He has normal strength. No sensory deficit (no numbness or tingling). Skin: Skin is warm and dry. No rash noted. Prurigo lesions on arms and legs with secondary scars. These are well healed at present. Strap marks from CPAP noted on face   Psychiatric: He has a normal mood and affect. ASSESSMENT      ICD-10-CM    1. Visit for preventive health examination Z00.00 meloxicam (MOBIC) 15 MG tablet     CBC Auto Differential     Comprehensive Metabolic Panel     Lipid Panel     T4, Free     TSH without Reflex     Microalbumin / Creatinine Urine Ratio     Hemoglobin A1C   2. Essential hypertension I10 CBC Auto Differential     Comprehensive Metabolic Panel     Microalbumin / Creatinine Urine Ratio   3. Mixed hyperlipidemia E78.2 Lipid Panel   4. Impaired glucose tolerance R73.02 T4, Free     TSH without Reflex     Microalbumin / Creatinine Urine Ratio     Hemoglobin A1C   5. Elevated transaminase level R74.0    6. Primary osteoarthritis of left knee M17.12 meloxicam (MOBIC) 15 MG tablet     triamcinolone acetonide (KENALOG-40) injection 40 mg     methylPREDNISolone acetate (DEPO-MEDROL) injection 40 mg     IA DRAIN/INJECT LARGE JOINT/BURSA   7. Primary osteoarthritis of right knee M17.11 meloxicam (MOBIC) 15 MG tablet     triamcinolone acetonide (KENALOG-40) injection 40 mg     methylPREDNISolone acetate (DEPO-MEDROL) injection 40 mg     IA DRAIN/INJECT LARGE JOINT/BURSA   8. BMI 45.0-49.9, adult ([facility]) Z68.42 T4, Free     TSH without Reflex           PLAN      ICD-10-CM    1. Visit for preventive health examination Z00.00 meloxicam (MOBIC) 15 MG tablet     CBC Auto Differential     Comprehensive Metabolic Panel     Lipid Panel     T4, Free     TSH without Reflex     Microalbumin / Creatinine Urine Ratio     Hemoglobin A1C    Routine age-appropriate anticipatory guidance was provided.   Annual wellness visit was performed

## 2018-11-19 NOTE — PATIENT INSTRUCTIONS
Patient Education        Well Visit, Ages 25 to 48: Care Instructions  Your Care Instructions    Physical exams can help you stay healthy. Your doctor has checked your overall health and may have suggested ways to take good care of yourself. He or she also may have recommended tests. At home, you can help prevent illness with healthy eating, regular exercise, and other steps. Follow-up care is a key part of your treatment and safety. Be sure to make and go to all appointments, and call your doctor if you are having problems. It's also a good idea to know your test results and keep a list of the medicines you take. How can you care for yourself at home? · Reach and stay at a healthy weight. This will lower your risk for many problems, such as obesity, diabetes, heart disease, and high blood pressure. · Get at least 30 minutes of physical activity on most days of the week. Walking is a good choice. You also may want to do other activities, such as running, swimming, cycling, or playing tennis or team sports. Discuss any changes in your exercise program with your doctor. · Do not smoke or allow others to smoke around you. If you need help quitting, talk to your doctor about stop-smoking programs and medicines. These can increase your chances of quitting for good. · Talk to your doctor about whether you have any risk factors for sexually transmitted infections (STIs). Having one sex partner (who does not have STIs and does not have sex with anyone else) is a good way to avoid these infections. · Use birth control if you do not want to have children at this time. Talk with your doctor about the choices available and what might be best for you. · Protect your skin from too much sun. When you're outdoors from 10 a.m. to 4 p.m., stay in the shade or cover up with clothing and a hat with a wide brim. Wear sunglasses that block UV rays.  Even when it's cloudy, put broad-spectrum sunscreen (SPF 30 or higher) on any condoms. For men  · Tests for sexually transmitted infections (STIs). Ask whether you should have tests for STIs. You may be at risk if you have sex with more than one person, especially if you do not wear a condom. · Testicular cancer exam. Ask your doctor whether you should check your testicles regularly. · Prostate exam. Talk to your doctor about whether you should have a blood test (called a PSA test) for prostate cancer. Experts differ on whether and when men should have this test. Some experts suggest it if you are older than 39 and are -American or have a father or brother who got prostate cancer when he was younger than 72. When should you call for help? Watch closely for changes in your health, and be sure to contact your doctor if you have any problems or symptoms that concern you. Where can you learn more? Go to https://chavieb.healthPollen - [employer] Platform. org and sign in to your Agilence account. Enter P072 in the [employer] box to learn more about \"Well Visit, Ages 25 to 48: Care Instructions. \"     If you do not have an account, please click on the \"Sign Up Now\" link. Current as of: March 29, 2018  Content Version: 11.8  © 0960-8028 Healthwise, [employer]. Care instructions adapted under license by [facility] ([facility]). If you have questions about a medical condition or this instruction, always ask your healthcare professional. [address] any warranty or liability for your use of this information. Patient Education        Body Mass Index: Care Instructions  Your Care Instructions    Body mass index (BMI) can help you see if your weight is raising your risk for health problems. It uses a formula to compare how much you weigh with how tall you are. · A BMI lower than 18.5 is considered underweight. · A BMI between 18.5 and 24.9 is considered healthy. · A BMI between 25 and 29.9 is considered overweight. A BMI of 30 or higher is considered obese.   If your BMI is in the normal range, it means that you have a lower risk for weight-related health problems. If your BMI is in the overweight or obese range, you may be at increased risk for weight-related health problems, such as high blood pressure, heart disease, stroke, arthritis or joint pain, and diabetes. If your BMI is in the underweight range, you may be at increased risk for health problems such as fatigue, lower protection (immunity) against illness, muscle loss, bone loss, hair loss, and hormone problems. BMI is just one measure of your risk for weight-related health problems. You may be at higher risk for health problems if you are not active, you eat an unhealthy diet, or you drink too much alcohol or use tobacco products. Follow-up care is a key part of your treatment and safety. Be sure to make and go to all appointments, and call your doctor if you are having problems. It's also a good idea to know your test results and keep a list of the medicines you take. How can you care for yourself at home? · Practice healthy eating habits. This includes eating plenty of fruits, vegetables, whole grains, lean protein, and low-fat dairy. · If your doctor recommends it, get more exercise. Walking is a good choice. Bit by bit, increase the amount you walk every day. Try for at least 30 minutes on most days of the week. · Do not smoke. Smoking can increase your risk for health problems. If you need help quitting, talk to your doctor about stop-smoking programs and medicines. These can increase your chances of quitting for good. · Limit alcohol to 2 drinks a day for men and 1 drink a day for women. Too much alcohol can cause health problems. If you have a BMI higher than 25  · Your doctor may do other tests to check your risk for weight-related health problems.  This may include measuring the distance around your waist. A waist measurement of more than 40 inches in men or 35 inches in women can increase the risk of on your food. · Use low-sodium salad dressings, sauces, and ketchup. Or make your own salad dressings and sauces without adding salt. · Use less salt (or none) when recipes call for it. You can often use half the salt a recipe calls for without losing flavor. Other foods such as rice, pasta, and grains do not need added salt. · Rinse canned vegetables, and cook them in fresh water. This removes some--but not all--of the salt. · Avoid water that is naturally high in sodium or that has been treated with water softeners, which add sodium. Call your local water company to find out the sodium content of your water supply. If you buy bottled water, read the label and choose a sodium-free brand. Avoid high-sodium foods  · Avoid eating:  ? Smoked, cured, salted, and canned meat, fish, and poultry. ? Ham, santiago, hot dogs, and luncheon meats. ? Regular, hard, and processed cheese and regular peanut butter. ? Crackers with salted tops, and other salted snack foods such as pretzels, chips, and salted popcorn. ? Frozen prepared meals, unless labeled low-sodium. ? Canned and dried soups, broths, and bouillon, unless labeled sodium-free or low-sodium. ? Canned vegetables, unless labeled sodium-free or low-sodium. ? [place] fries, pizza, tacos, and other fast foods. ? Pickles, olives, ketchup, and other condiments, especially soy sauce, unless labeled sodium-free or low-sodium. Where can you learn more? Go to https://ContestMachinekathi.[employer]. org and sign in to your Medigram account. Enter B884 in the [facility] box to learn more about \"Low Sodium Diet (2,000 Milligram): Care Instructions. \"     If you do not have an account, please click on the \"Sign Up Now\" link. Current as of: March 29, 2018  Content Version: 11.8  © 1988-0918 Healthwise, [employer]. Care instructions adapted under license by [facility] ([facility]).  If you have questions about a medical condition or this instruction, always ask your

## 2019-02-05 ENCOUNTER — OFFICE VISIT (OUTPATIENT)
Dept: PRIMARY CARE CLINIC | Age: 44
End: 2019-02-05
Payer: COMMERCIAL

## 2019-02-05 VITALS
SYSTOLIC BLOOD PRESSURE: 130 MMHG | WEIGHT: 315 LBS | HEART RATE: 79 BPM | OXYGEN SATURATION: 93 % | BODY MASS INDEX: 41.75 KG/M2 | TEMPERATURE: 98.5 F | HEIGHT: 73 IN | DIASTOLIC BLOOD PRESSURE: 70 MMHG

## 2019-02-05 DIAGNOSIS — M17.11 PRIMARY OSTEOARTHRITIS OF RIGHT KNEE: ICD-10-CM

## 2019-02-05 DIAGNOSIS — E78.2 MIXED HYPERLIPIDEMIA: ICD-10-CM

## 2019-02-05 DIAGNOSIS — I10 ESSENTIAL HYPERTENSION: ICD-10-CM

## 2019-02-05 DIAGNOSIS — M17.12 PRIMARY OSTEOARTHRITIS OF LEFT KNEE: Primary | ICD-10-CM

## 2019-02-05 PROCEDURE — G8417 CALC BMI ABV UP PARAM F/U: HCPCS | Performed by: PEDIATRICS

## 2019-02-05 PROCEDURE — 99214 OFFICE O/P EST MOD 30 MIN: CPT | Performed by: PEDIATRICS

## 2019-02-05 PROCEDURE — 1036F TOBACCO NON-USER: CPT | Performed by: PEDIATRICS

## 2019-02-05 PROCEDURE — G8427 DOCREV CUR MEDS BY ELIG CLIN: HCPCS | Performed by: PEDIATRICS

## 2019-02-05 PROCEDURE — 20610 DRAIN/INJ JOINT/BURSA W/O US: CPT | Performed by: PEDIATRICS

## 2019-02-05 PROCEDURE — G8484 FLU IMMUNIZE NO ADMIN: HCPCS | Performed by: PEDIATRICS

## 2019-02-05 RX ORDER — METHYLPREDNISOLONE ACETATE 40 MG/ML
40 INJECTION, SUSPENSION INTRA-ARTICULAR; INTRALESIONAL; INTRAMUSCULAR; SOFT TISSUE ONCE
Status: COMPLETED | OUTPATIENT
Start: 2019-02-05 | End: 2019-02-05

## 2019-02-05 RX ORDER — TRIAMCINOLONE ACETONIDE 40 MG/ML
40 INJECTION, SUSPENSION INTRA-ARTICULAR; INTRAMUSCULAR ONCE
Status: COMPLETED | OUTPATIENT
Start: 2019-02-05 | End: 2019-02-05

## 2019-02-05 RX ORDER — PREDNISONE 10 MG/1
TABLET ORAL
Qty: 43 TABLET | Refills: 0 | Status: SHIPPED | OUTPATIENT
Start: 2019-02-05 | End: 2019-08-05 | Stop reason: ALTCHOICE

## 2019-02-05 RX ADMIN — TRIAMCINOLONE ACETONIDE 40 MG: 40 INJECTION, SUSPENSION INTRA-ARTICULAR; INTRAMUSCULAR at 07:55

## 2019-02-05 RX ADMIN — METHYLPREDNISOLONE ACETATE 40 MG: 40 INJECTION, SUSPENSION INTRA-ARTICULAR; INTRALESIONAL; INTRAMUSCULAR; SOFT TISSUE at 07:54

## 2019-02-05 ASSESSMENT — ENCOUNTER SYMPTOMS
WHEEZING: 0
SHORTNESS OF BREATH: 0
DIARRHEA: 0
CHEST TIGHTNESS: 0
COUGH: 0
NAUSEA: 0
SORE THROAT: 0
BACK PAIN: 0
ABDOMINAL PAIN: 0
VOMITING: 0

## 2019-08-05 ENCOUNTER — OFFICE VISIT (OUTPATIENT)
Dept: PRIMARY CARE CLINIC | Age: 44
End: 2019-08-05
Payer: COMMERCIAL

## 2019-08-05 VITALS
HEIGHT: 73 IN | SYSTOLIC BLOOD PRESSURE: 138 MMHG | WEIGHT: 315 LBS | OXYGEN SATURATION: 96 % | DIASTOLIC BLOOD PRESSURE: 82 MMHG | TEMPERATURE: 98.6 F | BODY MASS INDEX: 41.75 KG/M2 | HEART RATE: 81 BPM

## 2019-08-05 DIAGNOSIS — R63.5 WEIGHT GAIN: ICD-10-CM

## 2019-08-05 DIAGNOSIS — I10 ESSENTIAL HYPERTENSION: Primary | ICD-10-CM

## 2019-08-05 DIAGNOSIS — E78.2 MIXED HYPERLIPIDEMIA: ICD-10-CM

## 2019-08-05 DIAGNOSIS — R73.02 IMPAIRED GLUCOSE TOLERANCE: ICD-10-CM

## 2019-08-05 DIAGNOSIS — R73.9 HYPERGLYCEMIA: ICD-10-CM

## 2019-08-05 DIAGNOSIS — M17.12 PRIMARY OSTEOARTHRITIS OF LEFT KNEE: ICD-10-CM

## 2019-08-05 DIAGNOSIS — M17.11 PRIMARY OSTEOARTHRITIS OF RIGHT KNEE: ICD-10-CM

## 2019-08-05 PROCEDURE — 1036F TOBACCO NON-USER: CPT | Performed by: PEDIATRICS

## 2019-08-05 PROCEDURE — G8427 DOCREV CUR MEDS BY ELIG CLIN: HCPCS | Performed by: PEDIATRICS

## 2019-08-05 PROCEDURE — 20610 DRAIN/INJ JOINT/BURSA W/O US: CPT | Performed by: PEDIATRICS

## 2019-08-05 PROCEDURE — G8417 CALC BMI ABV UP PARAM F/U: HCPCS | Performed by: PEDIATRICS

## 2019-08-05 PROCEDURE — 99213 OFFICE O/P EST LOW 20 MIN: CPT | Performed by: PEDIATRICS

## 2019-08-05 RX ORDER — TRIAMCINOLONE ACETONIDE 40 MG/ML
40 INJECTION, SUSPENSION INTRA-ARTICULAR; INTRAMUSCULAR ONCE
Status: COMPLETED | OUTPATIENT
Start: 2019-08-05 | End: 2019-08-05

## 2019-08-05 RX ORDER — METHYLPREDNISOLONE ACETATE 80 MG/ML
80 INJECTION, SUSPENSION INTRA-ARTICULAR; INTRALESIONAL; INTRAMUSCULAR; SOFT TISSUE ONCE
Status: COMPLETED | OUTPATIENT
Start: 2019-08-05 | End: 2019-08-05

## 2019-08-05 RX ADMIN — METHYLPREDNISOLONE ACETATE 80 MG: 80 INJECTION, SUSPENSION INTRA-ARTICULAR; INTRALESIONAL; INTRAMUSCULAR; SOFT TISSUE at 13:24

## 2019-08-05 RX ADMIN — TRIAMCINOLONE ACETONIDE 40 MG: 40 INJECTION, SUSPENSION INTRA-ARTICULAR; INTRAMUSCULAR at 13:25

## 2019-08-05 ASSESSMENT — ENCOUNTER SYMPTOMS
WHEEZING: 0
BACK PAIN: 0
VOMITING: 0
ABDOMINAL PAIN: 0
COUGH: 0
NAUSEA: 0
SORE THROAT: 0
DIARRHEA: 0
SHORTNESS OF BREATH: 0
CHEST TIGHTNESS: 0

## 2019-08-05 ASSESSMENT — PATIENT HEALTH QUESTIONNAIRE - PHQ9
SUM OF ALL RESPONSES TO PHQ QUESTIONS 1-9: 0
2. FEELING DOWN, DEPRESSED OR HOPELESS: 0
SUM OF ALL RESPONSES TO PHQ9 QUESTIONS 1 & 2: 0
1. LITTLE INTEREST OR PLEASURE IN DOING THINGS: 0
SUM OF ALL RESPONSES TO PHQ QUESTIONS 1-9: 0

## 2019-08-05 NOTE — PROGRESS NOTES
[facility], [address]  Phone [phone]   Fax [phone]      OFFICE VISIT: 2019    Kalie Harper-: 1975      HPI  Reason For Visit:  Kalie Burks is a 53 y.o. Health Maintenance    Knee Pain (Bilateral knee pain- Patient states arthritis- Patient requesting cortisone injections in bilateral knees. )    Patient presents with severe bilateral knee arthritis. He has been getting serial knee injections with good effect. He is here today for additional injection in bilateral knees. These injections have been helpful over the past.  He is wanting another round of injections today. He is taking meloxicam.       height is 6' 1\" (1.854 m) and weight is 360 lb 6.4 oz (163.5 kg) (abnormal). His temporal temperature is 98.6 °F (37 °C). His blood pressure is 138/82 and his pulse is 81. His oxygen saturation is 96%. Body mass index is 47.55 kg/m². I have reviewed the following with the Mr. Winter Teague   Lab Review  No visits with results within 6 Month(s) from this visit.    Latest known visit with results is:   Orders Only on 2017   Component Date Value    WBC 2017 11.0*    RBC 2017 4.77     Hemoglobin 2017 15.0     Hematocrit 2017 46.0     MCV 2017 96.4*    MCH 2017 31.4*    MCHC 2017 32.6*    RDW 2017 14.5     Platelets  346     MPV 2017 9.7     Neutrophils % 2017 69.5*    Lymphocytes % 2017 20.1     Monocytes % 2017 7.1     Eosinophils % 2017 1.5     Basophils % 2017 0.8     Neutrophils # 2017 7.6*    Lymphocytes # 2017 2.2     Monocytes # 2017 0.80     Eosinophils # 2017 0.20     Basophils # 2017 0.10     Sodium 2017 140     Potassium 2017 4.1     Chloride 2017 99     CO2 2017 23     Anion Gap 2017 18     Glucose 2017 88     BUN 2017 14     CREATININE 2017 0.7 (erectile dysfunction)     Fatigue     Hyperlipidemia     Hypertension     Obesity     Osteoarthrosis     Snoring        No family history on file. Past Surgical History:   Procedure Laterality Date    ARM SURGERY Left     broken     KNEE SURGERY      Scope     VASECTOMY         Social History     Tobacco Use    Smoking status: Former Smoker     Packs/day: 0.50     Years: 10.00     Pack years: 5.00     Last attempt to quit: 11/19/2003     Years since quitting: 15.7    Smokeless tobacco: Former User   Substance Use Topics    Alcohol use: No        Review of Systems   Constitutional: Negative for chills, fatigue and fever. HENT: Negative for congestion, ear pain and sore throat. Eyes: Negative for visual disturbance. Respiratory: Negative for cough, chest tightness, shortness of breath and wheezing. Uses CPAP regularly   Cardiovascular: Negative for chest pain, palpitations and leg swelling. Gastrointestinal: Negative for abdominal pain, diarrhea, nausea and vomiting. Endocrine: Negative for polyuria. Genitourinary: Negative for frequency and urgency. Musculoskeletal: Positive for arthralgias (bilateral knees). Negative for back pain and neck pain. Skin: Negative for rash. Neurological: Negative for dizziness and headaches. Psychiatric/Behavioral: Negative for self-injury. The patient is not nervous/anxious. Physical Exam   Constitutional: He is oriented to person, place, and time. He appears well-developed and well-nourished. No distress. HENT:   Head: Normocephalic and atraumatic. Right Ear: External ear normal.   Left Ear: External ear normal.   Nose: Nose normal.   Mouth/Throat: Oropharynx is clear and moist.   Eyes: Pupils are equal, round, and reactive to light. Conjunctivae and EOM are normal. No scleral icterus. Neck: Normal range of motion. Neck supple. No JVD present. Carotid bruit is not present. No thyromegaly present.    Cardiovascular: Normal rate, regular rhythm, S1 normal, S2 normal and normal heart sounds. No extrasystoles are present. PMI is not displaced. Exam reveals no gallop and no friction rub. No murmur heard. Pulmonary/Chest: Effort normal and breath sounds normal. No respiratory distress. He has no wheezes. He has no rhonchi. He has no rales. Abdominal: Soft. Bowel sounds are normal. There is no hepatosplenomegaly. There is no tenderness. There is no rebound, no guarding and no CVA tenderness. Genitourinary:   Genitourinary Comments: Exam deferred   Musculoskeletal: Normal range of motion. He exhibits no edema or tenderness. Crepitus in bilateral knees. Some occasional popping with movement   Lymphadenopathy:     He has no cervical adenopathy. Neurological: He is alert and oriented to person, place, and time. He has normal strength. No sensory deficit (no numbness or tingling). Skin: Skin is warm and dry. No rash noted. Psychiatric: He has a normal mood and affect. ASSESSMENT      ICD-10-CM    1. Essential hypertension I10 CBC Auto Differential     Comprehensive Metabolic Panel     Microalbumin / Creatinine Urine Ratio   2. Mixed hyperlipidemia E78.2 Lipid Panel   3. Impaired glucose tolerance R73.02    4. BMI 45.0-49.9, adult (HCC) Z68.42 T4, Free     TSH without Reflex   5. Weight gain R63.5    6. Primary osteoarthritis of left knee M17.12 triamcinolone acetonide (KENALOG-40) injection 40 mg     methylPREDNISolone acetate (DEPO-MEDROL) injection 80 mg     ME DRAIN/INJECT LARGE JOINT/BURSA   7. Primary osteoarthritis of right knee M17.11 triamcinolone acetonide (KENALOG-40) injection 40 mg     methylPREDNISolone acetate (DEPO-MEDROL) injection 80 mg     ME DRAIN/INJECT LARGE JOINT/BURSA   8. Hyperglycemia R73.9 Hemoglobin A1C       PLAN      ICD-10-CM    1. Essential hypertension I10 CBC Auto Differential     Comprehensive Metabolic Panel     Microalbumin / Creatinine Urine Ratio   2.  Mixed hyperlipidemia E78.2 Lipid Panel

## 2019-11-04 DIAGNOSIS — I10 ESSENTIAL HYPERTENSION: ICD-10-CM

## 2019-11-04 RX ORDER — AMLODIPINE BESYLATE 5 MG/1
5 TABLET ORAL DAILY
Qty: 90 TABLET | Refills: 3 | Status: SHIPPED | OUTPATIENT
Start: 2019-11-04 | End: 2020-11-27 | Stop reason: SDUPTHER

## 2019-11-28 DIAGNOSIS — E78.2 MIXED HYPERLIPIDEMIA: ICD-10-CM

## 2019-11-28 DIAGNOSIS — Z00.00 VISIT FOR PREVENTIVE HEALTH EXAMINATION: ICD-10-CM

## 2019-11-28 DIAGNOSIS — I10 ESSENTIAL HYPERTENSION: ICD-10-CM

## 2019-11-28 DIAGNOSIS — M17.12 PRIMARY OSTEOARTHRITIS OF LEFT KNEE: ICD-10-CM

## 2019-11-28 DIAGNOSIS — M17.11 PRIMARY OSTEOARTHRITIS OF RIGHT KNEE: ICD-10-CM

## 2019-12-02 RX ORDER — MELOXICAM 15 MG/1
15 TABLET ORAL DAILY
Qty: 90 TABLET | Refills: 3 | Status: SHIPPED | OUTPATIENT
Start: 2019-12-02 | End: 2020-12-21 | Stop reason: SDUPTHER

## 2019-12-02 RX ORDER — LISINOPRIL AND HYDROCHLOROTHIAZIDE 20; 12.5 MG/1; MG/1
1 TABLET ORAL 2 TIMES DAILY
Qty: 180 TABLET | Refills: 3 | Status: SHIPPED | OUTPATIENT
Start: 2019-12-02 | End: 2020-12-21 | Stop reason: SDUPTHER

## 2019-12-02 RX ORDER — ATORVASTATIN CALCIUM 40 MG/1
40 TABLET, FILM COATED ORAL NIGHTLY
Qty: 90 TABLET | Refills: 3 | Status: SHIPPED | OUTPATIENT
Start: 2019-12-02 | End: 2020-12-21 | Stop reason: SDUPTHER

## 2019-12-19 ENCOUNTER — OFFICE VISIT (OUTPATIENT)
Dept: PRIMARY CARE CLINIC | Age: 44
End: 2019-12-19
Payer: COMMERCIAL

## 2019-12-19 VITALS
HEIGHT: 73 IN | OXYGEN SATURATION: 96 % | BODY MASS INDEX: 41.75 KG/M2 | TEMPERATURE: 98.6 F | DIASTOLIC BLOOD PRESSURE: 80 MMHG | SYSTOLIC BLOOD PRESSURE: 135 MMHG | WEIGHT: 315 LBS | HEART RATE: 89 BPM

## 2019-12-19 DIAGNOSIS — F52.21 ED (ERECTILE DYSFUNCTION) OF NON-ORGANIC ORIGIN: ICD-10-CM

## 2019-12-19 DIAGNOSIS — M17.12 PRIMARY OSTEOARTHRITIS OF LEFT KNEE: Primary | ICD-10-CM

## 2019-12-19 DIAGNOSIS — M17.11 PRIMARY OSTEOARTHRITIS OF RIGHT KNEE: ICD-10-CM

## 2019-12-19 PROCEDURE — G8427 DOCREV CUR MEDS BY ELIG CLIN: HCPCS | Performed by: PEDIATRICS

## 2019-12-19 PROCEDURE — G8484 FLU IMMUNIZE NO ADMIN: HCPCS | Performed by: PEDIATRICS

## 2019-12-19 PROCEDURE — G8417 CALC BMI ABV UP PARAM F/U: HCPCS | Performed by: PEDIATRICS

## 2019-12-19 PROCEDURE — 1036F TOBACCO NON-USER: CPT | Performed by: PEDIATRICS

## 2019-12-19 PROCEDURE — 20610 DRAIN/INJ JOINT/BURSA W/O US: CPT | Performed by: PEDIATRICS

## 2019-12-19 PROCEDURE — 99213 OFFICE O/P EST LOW 20 MIN: CPT | Performed by: PEDIATRICS

## 2019-12-19 RX ORDER — ASCORBIC ACID 500 MG
500 TABLET ORAL DAILY
COMMUNITY
End: 2020-10-01

## 2019-12-19 RX ORDER — SILDENAFIL 100 MG/1
100 TABLET, FILM COATED ORAL PRN
Qty: 30 TABLET | Refills: 3 | Status: SHIPPED | OUTPATIENT
Start: 2019-12-19 | End: 2022-02-08 | Stop reason: SDUPTHER

## 2019-12-19 RX ORDER — METHYLPREDNISOLONE ACETATE 80 MG/ML
80 INJECTION, SUSPENSION INTRA-ARTICULAR; INTRALESIONAL; INTRAMUSCULAR; SOFT TISSUE ONCE
Status: COMPLETED | OUTPATIENT
Start: 2019-12-19 | End: 2019-12-20

## 2019-12-19 RX ORDER — TRIAMCINOLONE ACETONIDE 40 MG/ML
40 INJECTION, SUSPENSION INTRA-ARTICULAR; INTRAMUSCULAR ONCE
Status: COMPLETED | OUTPATIENT
Start: 2019-12-19 | End: 2019-12-20

## 2019-12-20 RX ADMIN — TRIAMCINOLONE ACETONIDE 40 MG: 40 INJECTION, SUSPENSION INTRA-ARTICULAR; INTRAMUSCULAR at 09:44

## 2019-12-20 RX ADMIN — METHYLPREDNISOLONE ACETATE 80 MG: 80 INJECTION, SUSPENSION INTRA-ARTICULAR; INTRALESIONAL; INTRAMUSCULAR; SOFT TISSUE at 09:43

## 2019-12-20 ASSESSMENT — ENCOUNTER SYMPTOMS
ABDOMINAL PAIN: 0
SORE THROAT: 0
SHORTNESS OF BREATH: 0
DIARRHEA: 0
CHEST TIGHTNESS: 0
VOMITING: 0
NAUSEA: 0
BACK PAIN: 0
WHEEZING: 0
COUGH: 0

## 2020-03-26 ENCOUNTER — OFFICE VISIT (OUTPATIENT)
Dept: PRIMARY CARE CLINIC | Age: 45
End: 2020-03-26
Payer: COMMERCIAL

## 2020-03-26 VITALS
TEMPERATURE: 97.6 F | OXYGEN SATURATION: 96 % | HEIGHT: 73 IN | BODY MASS INDEX: 41.75 KG/M2 | DIASTOLIC BLOOD PRESSURE: 90 MMHG | SYSTOLIC BLOOD PRESSURE: 132 MMHG | HEART RATE: 86 BPM | WEIGHT: 315 LBS

## 2020-03-26 PROCEDURE — 20610 DRAIN/INJ JOINT/BURSA W/O US: CPT | Performed by: PEDIATRICS

## 2020-03-26 PROCEDURE — 99213 OFFICE O/P EST LOW 20 MIN: CPT | Performed by: PEDIATRICS

## 2020-03-26 RX ORDER — TRIAMCINOLONE ACETONIDE 40 MG/ML
40 INJECTION, SUSPENSION INTRA-ARTICULAR; INTRAMUSCULAR ONCE
Status: COMPLETED | OUTPATIENT
Start: 2020-03-26 | End: 2020-03-26

## 2020-03-26 RX ORDER — METHYLPREDNISOLONE ACETATE 80 MG/ML
80 INJECTION, SUSPENSION INTRA-ARTICULAR; INTRALESIONAL; INTRAMUSCULAR; SOFT TISSUE ONCE
Status: COMPLETED | OUTPATIENT
Start: 2020-03-26 | End: 2020-03-26

## 2020-03-26 RX ADMIN — METHYLPREDNISOLONE ACETATE 80 MG: 80 INJECTION, SUSPENSION INTRA-ARTICULAR; INTRALESIONAL; INTRAMUSCULAR; SOFT TISSUE at 12:20

## 2020-03-26 RX ADMIN — TRIAMCINOLONE ACETONIDE 40 MG: 40 INJECTION, SUSPENSION INTRA-ARTICULAR; INTRAMUSCULAR at 12:20

## 2020-03-26 RX ADMIN — METHYLPREDNISOLONE ACETATE 80 MG: 80 INJECTION, SUSPENSION INTRA-ARTICULAR; INTRALESIONAL; INTRAMUSCULAR; SOFT TISSUE at 12:19

## 2020-03-26 ASSESSMENT — PATIENT HEALTH QUESTIONNAIRE - PHQ9
2. FEELING DOWN, DEPRESSED OR HOPELESS: 0
SUM OF ALL RESPONSES TO PHQ QUESTIONS 1-9: 0
SUM OF ALL RESPONSES TO PHQ9 QUESTIONS 1 & 2: 0
1. LITTLE INTEREST OR PLEASURE IN DOING THINGS: 0
SUM OF ALL RESPONSES TO PHQ QUESTIONS 1-9: 0

## 2020-03-26 ASSESSMENT — ENCOUNTER SYMPTOMS
WHEEZING: 0
VOMITING: 0
SHORTNESS OF BREATH: 0
CHEST TIGHTNESS: 0
SORE THROAT: 0
COUGH: 0
ABDOMINAL PAIN: 0
DIARRHEA: 0
BACK PAIN: 0
NAUSEA: 0

## 2020-03-26 NOTE — PATIENT INSTRUCTIONS
Patient Education        Arthritis: Care Instructions  Your Care Instructions  Arthritis, also called osteoarthritis, is a breakdown of the cartilage that cushions your joints. When the cartilage wears down, your bones rub against each other. This causes pain and stiffness. Many people have some arthritis as they age. Arthritis most often affects the joints of the spine, hands, hips, knees, or feet. You can take simple measures to protect your joints, ease your pain, and help you stay active. Follow-up care is a key part of your treatment and safety. Be sure to make and go to all appointments, and call your doctor if you are having problems. It's also a good idea to know your test results and keep a list of the medicines you take. How can you care for yourself at home? · Stay at a healthy weight. Being overweight puts extra strain on your joints. · Talk to your doctor or physical therapist about exercises that will help ease joint pain. ? Stretch. You may enjoy gentle forms of yoga to help keep your joints and muscles flexible. ? Walk instead of jog. Other types of exercise that are less stressful on the joints include riding a bicycle, swimming, khai chi, or water exercise. ? Lift weights. Strong muscles help reduce stress on your joints. Stronger thigh muscles, for example, take some of the stress off of the knees and hips. Learn the right way to lift weights so you do not make joint pain worse. · Take your medicines exactly as prescribed. Call your doctor if you think you are having a problem with your medicine. · Take pain medicines exactly as directed. ? If the doctor gave you a prescription medicine for pain, take it as prescribed. ? If you are not taking a prescription pain medicine, ask your doctor if you can take an over-the-counter medicine. · Use a cane, crutch, walker, or another device if you need help to get around. These can help rest your joints.  You also can use other things to make

## 2020-03-26 NOTE — PROGRESS NOTES
09/18/2017 69.5*    Lymphocytes % 09/18/2017 20.1     Monocytes % 09/18/2017 7.1     Eosinophils % 09/18/2017 1.5     Basophils % 09/18/2017 0.8     Neutrophils Absolute 09/18/2017 7.6*    Lymphocytes Absolute 09/18/2017 2.2     Monocytes Absolute 09/18/2017 0.80     Eosinophils Absolute 09/18/2017 0.20     Basophils Absolute 09/18/2017 0.10     Sodium 09/18/2017 140     Potassium 09/18/2017 4.1     Chloride 09/18/2017 99     CO2 09/18/2017 23     Anion Gap 09/18/2017 18     Glucose 09/18/2017 88     BUN 09/18/2017 14     CREATININE 09/18/2017 0.7     GFR Non- 09/18/2017 >60     Calcium 09/18/2017 10.0     Total Protein 09/18/2017 7.7     Alb 09/18/2017 4.1     Total Bilirubin 09/18/2017 0.5     Alkaline Phosphatase 09/18/2017 141*    ALT 09/18/2017 24     AST 09/18/2017 26     Cholesterol, Total 09/18/2017 132*    Triglycerides 09/18/2017 112*    HDL 09/18/2017 38*    LDL Calculated 09/18/2017 72     Microalbumin, Random Uri* 09/18/2017 2.00     Creatinine, Ur 09/18/2017 127.5     Microalbumin Creatinine * 09/18/2017 15.7     T4 Free 09/18/2017 1.3     TSH 09/18/2017 0.825      Copies of these are in the chart. Current Outpatient Medications   Medication Sig Dispense Refill    vitamin C (ASCORBIC ACID) 500 MG tablet Take 500 mg by mouth daily      sildenafil (VIAGRA) 100 MG tablet Take 1 tablet by mouth as needed for Erectile Dysfunction 30 tablet 3    meloxicam (MOBIC) 15 MG tablet Take 1 tablet by mouth daily 90 tablet 3    atorvastatin (LIPITOR) 40 MG tablet Take 1 tablet by mouth nightly 90 tablet 3    lisinopril-hydrochlorothiazide (PRINZIDE;ZESTORETIC) 20-12.5 MG per tablet Take 1 tablet by mouth 2 times daily 180 tablet 3    amLODIPine (NORVASC) 5 MG tablet Take 1 tablet by mouth daily 90 tablet 3    CPAP Machine MISC by Does not apply route nightly      triamcinolone (KENALOG) 0.1 % cream Apply topically 2 times daily.  (Patient taking differently: Apply topically 2 times daily as needed Apply topically 2 times daily.) 80 g 5     No current facility-administered medications for this visit. Allergies: Patient has no known allergies. Past Medical History:   Diagnosis Date    ED (erectile dysfunction)     Fatigue     Hyperlipidemia     Hypertension     Obesity     Osteoarthrosis     Snoring        No family history on file. Past Surgical History:   Procedure Laterality Date    ARM SURGERY Left     broken     KNEE SURGERY      Scope     VASECTOMY         Social History     Tobacco Use    Smoking status: Former Smoker     Packs/day: 0.50     Years: 10.00     Pack years: 5.00     Last attempt to quit: 2003     Years since quittin.3    Smokeless tobacco: Former User   Substance Use Topics    Alcohol use: No        Review of Systems   Constitutional: Negative for chills, fatigue and fever. HENT: Negative for congestion, ear pain and sore throat. Eyes: Negative for visual disturbance. Respiratory: Negative for cough, chest tightness, shortness of breath and wheezing. Uses CPAP regularly   Cardiovascular: Negative for chest pain, palpitations and leg swelling. Gastrointestinal: Negative for abdominal pain, diarrhea, nausea and vomiting. Endocrine: Negative for polyuria. Genitourinary: Negative for frequency and urgency. Musculoskeletal: Positive for arthralgias (bilateral knees). Negative for back pain and neck pain. Skin: Negative for rash. Neurological: Negative for dizziness and headaches. Psychiatric/Behavioral: Negative for self-injury. The patient is not nervous/anxious. Physical Exam  Constitutional:       General: He is not in acute distress. Appearance: He is well-developed. HENT:      Head: Normocephalic and atraumatic. Right Ear: External ear normal.      Left Ear: External ear normal.      Nose: Nose normal.   Eyes:      General: No scleral icterus.      Conjunctiva/sclera: Conjunctivae normal.      Pupils: Pupils are equal, round, and reactive to light. Neck:      Musculoskeletal: Normal range of motion and neck supple. Thyroid: No thyromegaly. Vascular: No carotid bruit or JVD. Cardiovascular:      Rate and Rhythm: Normal rate and regular rhythm. No extrasystoles are present. Chest Wall: PMI is not displaced. Heart sounds: Normal heart sounds, S1 normal and S2 normal. No murmur. No friction rub. No gallop. Pulmonary:      Effort: Pulmonary effort is normal. No respiratory distress. Breath sounds: Normal breath sounds. No wheezing, rhonchi or rales. Abdominal:      General: Bowel sounds are normal.      Palpations: Abdomen is soft. Tenderness: There is no abdominal tenderness. There is no guarding or rebound. Genitourinary:     Comments: Exam deferred  Musculoskeletal: Normal range of motion. General: No tenderness. Comments: Crepitus in bilateral knees. Some occasional popping with movement   Lymphadenopathy:      Cervical: No cervical adenopathy. Skin:     General: Skin is warm and dry. Findings: No rash. Neurological:      Mental Status: He is alert and oriented to person, place, and time. Sensory: No sensory deficit (no numbness or tingling). ASSESSMENT      ICD-10-CM    1. Primary osteoarthritis involving multiple joints M15.0    2. Primary osteoarthritis of left knee M17.12 triamcinolone acetonide (KENALOG-40) injection 40 mg     methylPREDNISolone acetate (DEPO-MEDROL) injection 80 mg     NC DRAIN/INJECT LARGE JOINT/BURSA   3. Primary osteoarthritis of right knee M17.11 triamcinolone acetonide (KENALOG-40) injection 40 mg     methylPREDNISolone acetate (DEPO-MEDROL) injection 80 mg     NC DRAIN/INJECT LARGE JOINT/BURSA   4. Class 3 severe obesity without serious comorbidity with body mass index (BMI) of 45.0 to 49.9 in adult, unspecified obesity type ([facility] E66.01     Z68.42    5.  Essential hypertension I10          PLAN    1. Primary osteoarthritis involving multiple joints  Is taking meloxicam on a routine basis for his generalized arthritis    2. Primary osteoarthritis of left knee  He has requested an injection in his left knee today. This was performed today  - triamcinolone acetonide (KENALOG-40) injection 40 mg  - methylPREDNISolone acetate (DEPO-MEDROL) injection 80 mg  - IL DRAIN/INJECT LARGE JOINT/BURSA    3. Primary osteoarthritis of right knee  He is requesting a injection in his right knee today. This was performed today  - triamcinolone acetonide (KENALOG-40) injection 40 mg  - methylPREDNISolone acetate (DEPO-MEDROL) injection 80 mg  - IL DRAIN/INJECT LARGE JOINT/BURSA    4. Class 3 severe obesity without serious comorbidity with body mass index (BMI) of 45.0 to 49.9 in adult, unspecified obesity type ([address]  We did discuss the importance of weight loss in regards to his arthritis in his knees. He understands this. He has actually lost weight since his last visit and he will continue his efforts in that regard    5. Essential hypertension  Blood pressure was borderline elevated on today's visit. He states this is most likely secondary to his pain. He does monitor blood pressure at home and does not feel that he needs any blood pressure medication adjustments at this time as it is home measurements are well controlled. Orders Placed This Encounter   Procedures    IL DRAIN/INJECT LARGE JOINT/BURSA    IL DRAIN/INJECT LARGE JOINT/BURSA        Return in about 3 months (around 6/26/2020) for 30. Knee Arthrocentesis with Injection Procedure Note    Pre-operative Diagnosis: left knee djd with pain    Post-operative Diagnosis: same    Indications: Symptom relief from osteoarthritis    Anesthesia: not required     Procedure Details     Verbal consent was obtained for the procedure. The joint was prepped with chlorhexadine.  The area was sprayed with Gebauer's Ethyl Chloride as a topical anesthetic and then a 22 gauge needle was inserted into the superior aspect of the joint from a lateral approach. 2 ml 1% lidocaine and 2 ml of DepoMedrol (40mg/ml) and 1ml Kenalog (40mg/ml)  was then injected into the joint. The needle was removed and the area cleansed and dressed. Complications:  None; patient tolerated the procedure well. Knee Arthrocentesis with Injection Procedure Note    Pre-operative Diagnosis: right knee djd with pain    Post-operative Diagnosis: same    Indications: Symptom relief from osteoarthritis    Anesthesia: not required     Procedure Details     Verbal consent was obtained for the procedure. The joint was prepped with chlorhexadine. The area was sprayed with Gebauer's Ethyl Chloride as a topical anesthetic and then a 22 gauge needle was inserted into the superior aspect of the joint from a lateral approach. 2 ml 1% lidocaine and 2 ml of DepoMedrol (40mg/ml) and 1ml Kenalog (40mg/ml)  was then injected into the joint. The needle was removed and the area cleansed and dressed. Complications:  None; patient tolerated the procedure well.

## 2020-06-30 ENCOUNTER — OFFICE VISIT (OUTPATIENT)
Dept: PRIMARY CARE CLINIC | Age: 45
End: 2020-06-30
Payer: COMMERCIAL

## 2020-06-30 VITALS
SYSTOLIC BLOOD PRESSURE: 138 MMHG | OXYGEN SATURATION: 97 % | TEMPERATURE: 98.6 F | WEIGHT: 315 LBS | BODY MASS INDEX: 41.75 KG/M2 | HEIGHT: 73 IN | HEART RATE: 78 BPM | DIASTOLIC BLOOD PRESSURE: 88 MMHG

## 2020-06-30 PROCEDURE — 99213 OFFICE O/P EST LOW 20 MIN: CPT | Performed by: PEDIATRICS

## 2020-06-30 PROCEDURE — 20610 DRAIN/INJ JOINT/BURSA W/O US: CPT | Performed by: PEDIATRICS

## 2020-06-30 RX ORDER — METHYLPREDNISOLONE ACETATE 80 MG/ML
80 INJECTION, SUSPENSION INTRA-ARTICULAR; INTRALESIONAL; INTRAMUSCULAR; SOFT TISSUE ONCE
Status: DISCONTINUED | OUTPATIENT
Start: 2020-06-30 | End: 2021-01-05

## 2020-06-30 RX ORDER — TRIAMCINOLONE ACETONIDE 40 MG/ML
40 INJECTION, SUSPENSION INTRA-ARTICULAR; INTRAMUSCULAR ONCE
Status: DISCONTINUED | OUTPATIENT
Start: 2020-06-30 | End: 2021-01-05

## 2020-06-30 ASSESSMENT — ENCOUNTER SYMPTOMS
WHEEZING: 0
DIARRHEA: 0
NAUSEA: 0
ABDOMINAL PAIN: 0
VOMITING: 0
BACK PAIN: 0
SORE THROAT: 0
COUGH: 0
SHORTNESS OF BREATH: 0
CHEST TIGHTNESS: 0

## 2020-06-30 NOTE — PROGRESS NOTES
[facility] [address],  [address]  Phone [phone]   Fax [phone]      OFFICE VISIT: 2020    Tyrell Moses [place]-: 1975      HPI  Reason For Visit:  Irlanda Faulkner is a 50 y.o.     3 Month Follow-Up (Knee pain in both knees, would like to get injections to help with pain)    He has problems with bilateral knees. He has had benefit from shots in the past.    He is trying to watch his diet and has been trying to exercise more but he is somewhat limited by his knee pain. He is hoping that if he does get some injections he will be able to exercise more and enhance his weight loss endeavors. On 2019 his weight was 356 pounds 12 ounces  On 2029, his weight was down to 349 pounds 4 ounces. Today his weight is 345 pounds 4 ounces. He is simply trying to cut out additional sweets and treats and is doing some exercising as tolerated. He is also beginning to watch his portion sizes and is hoping to add in some increased amount of exercise to enhance weight loss. We are hoping that if we can get enough weight off we can preserve his knees and prevent further complications and hopefully prevent the need for knee replacement surgery in the future. He is not monitoring his blood pressure on a regular basis. height is 6' 1\" (1.854 m) and weight is 345 lb 4 oz (156.6 kg) (abnormal). His temporal temperature is 98.6 °F (37 °C). His blood pressure is 138/88 and his pulse is 78. His oxygen saturation is 97%. Body mass index is 45.55 kg/m². I have reviewed the following with the Mr. Chelsea Martinez   Lab Review  No visits with results within 6 Month(s) from this visit.    Latest known visit with results is:   Orders Only on 2017   Component Date Value    WBC 2017 11.0*    RBC 2017 4.77     Hemoglobin 2017 15.0     Hematocrit 2017 46.0     MCV 2017 96.4*    MCH 2017 31.4*    MCHC 2017 32.6*    RDW 2017 14.5  Platelets 40/28/1455 346     MPV 09/18/2017 9.7     Neutrophils % 09/18/2017 69.5*    Lymphocytes % 09/18/2017 20.1     Monocytes % 09/18/2017 7.1     Eosinophils % 09/18/2017 1.5     Basophils % 09/18/2017 0.8     Neutrophils Absolute 09/18/2017 7.6*    Lymphocytes Absolute 09/18/2017 2.2     Monocytes Absolute 09/18/2017 0.80     Eosinophils Absolute 09/18/2017 0.20     Basophils Absolute 09/18/2017 0.10     Sodium 09/18/2017 140     Potassium 09/18/2017 4.1     Chloride 09/18/2017 99     CO2 09/18/2017 23     Anion Gap 09/18/2017 18     Glucose 09/18/2017 88     BUN 09/18/2017 14     CREATININE 09/18/2017 0.7     GFR Non- 09/18/2017 >60     Calcium 09/18/2017 10.0     Total Protein 09/18/2017 7.7     Alb 09/18/2017 4.1     Total Bilirubin 09/18/2017 0.5     Alkaline Phosphatase 09/18/2017 141*    ALT 09/18/2017 24     AST 09/18/2017 26     Cholesterol, Total 09/18/2017 132*    Triglycerides 09/18/2017 112*    HDL 09/18/2017 38*    LDL Calculated 09/18/2017 72     Microalbumin, Random Uri* 09/18/2017 2.00     Creatinine, Ur 09/18/2017 127.5     Microalbumin Creatinine * 09/18/2017 15.7     T4 Free 09/18/2017 1.3     TSH 09/18/2017 0.825      Copies of these are in the chart. Current Outpatient Medications   Medication Sig Dispense Refill    vitamin C (ASCORBIC ACID) 500 MG tablet Take 500 mg by mouth daily      sildenafil (VIAGRA) 100 MG tablet Take 1 tablet by mouth as needed for Erectile Dysfunction 30 tablet 3    meloxicam (MOBIC) 15 MG tablet Take 1 tablet by mouth daily 90 tablet 3    atorvastatin (LIPITOR) 40 MG tablet Take 1 tablet by mouth nightly 90 tablet 3    amLODIPine (NORVASC) 5 MG tablet Take 1 tablet by mouth daily 90 tablet 3    CPAP Machine MISC by Does not apply route nightly      triamcinolone (KENALOG) 0.1 % cream Apply topically 2 times daily.  (Patient taking differently: Apply topically 2 times daily as needed Apply topically breath and wheezing. Uses CPAP regularly   Cardiovascular: Negative for chest pain, palpitations and leg swelling. Gastrointestinal: Negative for abdominal pain, diarrhea, nausea and vomiting. Endocrine: Negative for polyuria. Genitourinary: Negative for frequency and urgency. Musculoskeletal: Positive for arthralgias (bilateral knees. These are not as bad as they were last time. ). Negative for back pain and neck pain. Skin: Negative for rash. Neurological: Negative for dizziness and headaches. Psychiatric/Behavioral: Negative for self-injury. The patient is not nervous/anxious. Physical Exam  Constitutional:       General: He is not in acute distress. Appearance: Normal appearance. He is well-developed. He is obese. HENT:      Head: Normocephalic and atraumatic. Right Ear: Ear canal and external ear normal.      Left Ear: Ear canal and external ear normal.      Nose: Nose normal.      Mouth/Throat:      Mouth: Mucous membranes are moist.      Pharynx: Oropharynx is clear. Eyes:      General: No scleral icterus. Extraocular Movements: Extraocular movements intact. Conjunctiva/sclera: Conjunctivae normal.      Pupils: Pupils are equal, round, and reactive to light. Neck:      Musculoskeletal: Normal range of motion and neck supple. Thyroid: No thyromegaly. Vascular: No carotid bruit or JVD. Cardiovascular:      Rate and Rhythm: Normal rate and regular rhythm. No extrasystoles are present. Chest Wall: PMI is not displaced. Pulses: Normal pulses. Heart sounds: Normal heart sounds, S1 normal and S2 normal. No murmur. No friction rub. No gallop. Pulmonary:      Effort: Pulmonary effort is normal. No respiratory distress. Breath sounds: Normal breath sounds. No wheezing, rhonchi or rales. Abdominal:      General: Bowel sounds are normal.      Palpations: Abdomen is soft. Tenderness: There is no abdominal tenderness.  There is no

## 2020-10-01 ENCOUNTER — OFFICE VISIT (OUTPATIENT)
Dept: PRIMARY CARE CLINIC | Age: 45
End: 2020-10-01
Payer: COMMERCIAL

## 2020-10-01 VITALS
HEART RATE: 79 BPM | OXYGEN SATURATION: 97 % | WEIGHT: 315 LBS | DIASTOLIC BLOOD PRESSURE: 80 MMHG | HEIGHT: 73 IN | TEMPERATURE: 97 F | SYSTOLIC BLOOD PRESSURE: 130 MMHG | BODY MASS INDEX: 41.75 KG/M2

## 2020-10-01 PROBLEM — M17.12 PRIMARY OSTEOARTHRITIS OF LEFT KNEE: Status: ACTIVE | Noted: 2020-10-01

## 2020-10-01 PROBLEM — M17.11 PRIMARY OSTEOARTHRITIS OF RIGHT KNEE: Status: ACTIVE | Noted: 2020-10-01

## 2020-10-01 PROCEDURE — 20610 DRAIN/INJ JOINT/BURSA W/O US: CPT | Performed by: PEDIATRICS

## 2020-10-01 PROCEDURE — 99214 OFFICE O/P EST MOD 30 MIN: CPT | Performed by: PEDIATRICS

## 2020-10-01 RX ORDER — METHYLPREDNISOLONE ACETATE 80 MG/ML
80 INJECTION, SUSPENSION INTRA-ARTICULAR; INTRALESIONAL; INTRAMUSCULAR; SOFT TISSUE ONCE
Status: COMPLETED | OUTPATIENT
Start: 2020-10-01 | End: 2020-10-01

## 2020-10-01 RX ORDER — TRIAMCINOLONE ACETONIDE 40 MG/ML
40 INJECTION, SUSPENSION INTRA-ARTICULAR; INTRAMUSCULAR ONCE
Status: COMPLETED | OUTPATIENT
Start: 2020-10-01 | End: 2020-10-01

## 2020-10-01 RX ADMIN — METHYLPREDNISOLONE ACETATE 80 MG: 80 INJECTION, SUSPENSION INTRA-ARTICULAR; INTRALESIONAL; INTRAMUSCULAR; SOFT TISSUE at 18:10

## 2020-10-01 RX ADMIN — TRIAMCINOLONE ACETONIDE 40 MG: 40 INJECTION, SUSPENSION INTRA-ARTICULAR; INTRAMUSCULAR at 18:10

## 2020-10-01 ASSESSMENT — ENCOUNTER SYMPTOMS
CHEST TIGHTNESS: 0
BACK PAIN: 0
SORE THROAT: 0
SHORTNESS OF BREATH: 0
WHEEZING: 0
DIARRHEA: 0
COUGH: 0
NAUSEA: 0
VOMITING: 0
ABDOMINAL PAIN: 0

## 2020-10-01 NOTE — PATIENT INSTRUCTIONS
Patient Education        Arthritis: Care Instructions  Overview  Arthritis, also called osteoarthritis, is a breakdown of the cartilage that cushions your joints. When the cartilage wears down, your bones rub against each other. This causes pain and stiffness. Many people have some arthritis as they age. Arthritis most often affects the joints of the spine, hands, hips, knees, or feet. Arthritis never goes away completely. But medicine and home treatment can help reduce pain and help you stay active. Follow-up care is a key part of your treatment and safety. Be sure to make and go to all appointments, and call your doctor if you are having problems. It's also a good idea to know your test results and keep a list of the medicines you take. How can you care for yourself at home? · Stay at a healthy weight. Being overweight puts extra strain on your joints. · Talk to your doctor or physical therapist about exercises that will help ease joint pain. ? Stretch. You may enjoy gentle forms of yoga to help keep your joints and muscles flexible. ? Walk instead of jog. Other types of exercise that are less stressful on the joints include riding a bike, swimming, khai chi, or water exercise. ? Lift weights. Strong muscles help reduce stress on your joints. Stronger thigh muscles, for example, take some of the stress off of the knees and hips. Learn the right way to lift weights so you don't make joint pain worse. · Take your medicines exactly as prescribed. Call your doctor if you think you are having a problem with your medicine. · Take pain medicines exactly as directed. ? If the doctor gave you a prescription medicine for pain, take it as prescribed. ? If you are not taking a prescription pain medicine, ask your doctor if you can take an over-the-counter medicine. · Use a cane, crutch, walker, or another device if you need help to get around. These can help rest your joints.  You also can use other things to the exercises if you start to have pain. You will be told when to start these exercises and which ones will work best for you. How to do the exercises  Knee flexion with heel slide   1. Lie on your back with your knees bent. 2. Slide your heel back by bending your affected knee as far as you can. Then hook your other foot around your ankle to help pull your heel even farther back. 3. Hold for about 6 seconds, then rest for up to 10 seconds. 4. Repeat 8 to 12 times. 5. Switch legs and repeat steps 1 through 4, even if only one knee is sore. Quad sets   1. Sit with your affected leg straight and supported on the floor or a firm bed. Place a small, rolled-up towel under your knee. Your other leg should be bent, with that foot flat on the floor. 2. Tighten the thigh muscles of your affected leg by pressing the back of your knee down into the towel. 3. Hold for about 6 seconds, then rest for up to 10 seconds. 4. Repeat 8 to 12 times. 5. Switch legs and repeat steps 1 through 4, even if only one knee is sore. Straight-leg raises to the front   1. Lie on your back with your good knee bent so that your foot rests flat on the floor. Your affected leg should be straight. Make sure that your low back has a normal curve. You should be able to slip your hand in between the floor and the small of your back, with your palm touching the floor and your back touching the back of your hand. 2. Tighten the thigh muscles in your affected leg by pressing the back of your knee flat down to the floor. Hold your knee straight. 3. Keeping the thigh muscles tight and your leg straight, lift your affected leg up so that your heel is about 12 inches off the floor. Hold for about 6 seconds, then lower slowly. 4. Relax for up to 10 seconds between repetitions. 5. Repeat 8 to 12 times. 6. Switch legs and repeat steps 1 through 5, even if only one knee is sore. Active knee flexion   1.  Lie on your stomach with your knees straight. If your kneecap is uncomfortable, roll up a washcloth and put it under your leg just above your kneecap. 2. Lift the foot of your affected leg by bending the knee so that you bring the foot up toward your buttock. If this motion hurts, try it without bending your knee quite as far. This may help you avoid any painful motion. 3. Slowly move your leg up and down. 4. Repeat 8 to 12 times. 5. Switch legs and repeat steps 1 through 4, even if only one knee is sore. Quadriceps stretch (facedown)   1. Lie flat on your stomach, and rest your face on the floor. 2. Wrap a towel or belt strap around the lower part of your affected leg. Then use the towel or belt strap to slowly pull your heel toward your buttock until you feel a stretch. 3. Hold for about 15 to 30 seconds, then relax your leg against the towel or belt strap. 4. Repeat 2 to 4 times. 5. Switch legs and repeat steps 1 through 4, even if only one knee is sore. Stationary exercise bike   1. If you do not have a stationary exercise bike at home, you can find one to ride at your local health club or community center. 2. Adjust the height of the bike seat so that your knee is slightly bent when your leg is extended downward. If your knee hurts when the pedal reaches the top, you can raise the seat so that your knee does not bend as much. 3. Start slowly. At first, try to do 5 to 10 minutes of cycling with little to no resistance. Then increase your time and the resistance bit by bit until you can do 20 to 30 minutes without pain. 4. If you start to have pain, rest your knee until your pain gets back to the level that is normal for you. Or cycle for less time or with less effort. Follow-up care is a key part of your treatment and safety. Be sure to make and go to all appointments, and call your doctor if you are having problems. It's also a good idea to know your test results and keep a list of the medicines you take.   Where can you learn more?  Go to [email or web address] and sign in to your [employer] account. Enter C159 in the [facility] box to learn more about \"Knee Arthritis: Exercises. \"     If you do not have an account, please click on the \"Sign Up Now\" link. Current as of: March 2, 2020               Content Version: 12.5  © 2006-2020 [employer]. Care instructions adapted under license by [facility] ([facility]). If you have questions about a medical condition or this instruction, always ask your healthcare professional. [place] [address] any warranty or liability for your use of this information. Patient Education        Knee: Exercises  Introduction  Here are some examples of exercises for you to try. The exercises may be suggested for a condition or for rehabilitation. Start each exercise slowly. Ease off the exercises if you start to have pain. You will be told when to start these exercises and which ones will work best for you. How to do the exercises  Quad sets   6. Sit with your leg straight and supported on the floor or a firm bed. (If you feel discomfort in the front or back of your knee, place a small towel roll under your knee.)  7. Tighten the muscles on top of your thigh by pressing the back of your knee flat down to the floor. (If you feel discomfort under your kneecap, place a small towel roll under your knee.)  8. Hold for about 6 seconds, then rest for up to 10 seconds. 9. Do 8 to 12 repetitions several times a day. Straight-leg raises to the front   6. Lie on your back with your good knee bent so that your foot rests flat on the floor. Your injured leg should be straight. Make sure that your low back has a normal curve. You should be able to slip your flat hand in between the floor and the small of your back, with your palm touching the floor and your back touching the back of your hand.   7. Tighten the thigh muscles in the injured leg by pressing the back of your knee flat down to the floor. Hold your knee straight. 8. Keeping the thigh muscles tight, lift your injured leg up so that your heel is about 12 inches off the floor. Hold for about 6 seconds and then lower slowly. 9. Do 8 to 12 repetitions, 3 times a day. Straight-leg raises to the outside   7. Lie on your side, with your injured leg on top. 8. Tighten the front thigh muscles of your injured leg to keep your knee straight. 9. Keep your hip and your leg straight in line with the rest of your body, and keep your knee pointing forward. Do not drop your hip back. 10. Lift your injured leg straight up toward the ceiling, about 12 inches off the floor. Hold for about 6 seconds, then slowly lower your leg. 11. Do 8 to 12 repetitions. Straight-leg raises to the back   6. Lie on your stomach, and lift your leg straight up behind you (toward the ceiling). 7. Lift your toes about 6 inches off the floor, hold for about 6 seconds, then lower slowly. 8. Do 8 to 12 repetitions. Straight-leg raises to the inside   6. Lie on the side of your body with the injured leg. 7. You can either prop your other (good) leg up on a chair, or you can bend your good knee and put that foot in front of your injured knee. Do not drop your hip back. 8. Tighten the muscles on the front of your thigh to straighten your injured knee. 9. Keep your kneecap pointing forward, and lift your whole leg up toward the ceiling about 6 inches. Hold for about 6 seconds, then lower slowly. 10. Do 8 to 12 repetitions. Heel dig bridging   5. Lie on your back with both knees bent and your ankles bent so that only your heels are digging into the floor. Your knees should be bent about 90 degrees. 6. Then push your heels into the floor, squeeze your buttocks, and lift your hips off the floor until your shoulders, hips, and knees are all in a straight line.   7. Hold for about 6 seconds as you continue to breathe normally, and then slowly lower your hips back down to the floor and rest for up to 10 seconds. 8. Do 8 to 12 repetitions. Hamstring curls   1. Lie on your stomach with your knees straight. If your kneecap is uncomfortable, roll up a washcloth and put it under your leg just above your kneecap. 2. Lift the foot of your injured leg by bending the knee so that you bring the foot up toward your buttock. If this motion hurts, try it without bending your knee quite as far. This may help you avoid any painful motion. 3. Slowly lower your leg back to the floor. 4. Do 8 to 12 repetitions. 5. With permission from your doctor or physical therapist, you may also want to add a cuff weight to your ankle (not more than 5 pounds). With weight, you do not have to lift your leg more than 12 inches to get a hamstring workout. Shallow standing knee bends   Do this exercise only if you have very little pain; if you have no clicking, locking, or giving way if you have an injured knee; and if it does not hurt while you are doing 8 to 12 repetitions. 1. Stand with your hands lightly resting on a counter or chair in front of you. Put your feet shoulder-width apart. 2. Slowly bend your knees so that you squat down like you are going to sit in a chair. Make sure your knees do not go in front of your toes. 3. Lower yourself about 6 inches. Your heels should remain on the floor at all times. 4. Rise slowly to a standing position. Heel raises   1. Stand with your feet a few inches apart, with your hands lightly resting on a counter or chair in front of you. 2. Slowly raise your heels off the floor while keeping your knees straight. 3. Hold for about 6 seconds, then slowly lower your heels to the floor. 4. Do 8 to 12 repetitions several times during the day. Follow-up care is a key part of your treatment and safety. Be sure to make and go to all appointments, and call your doctor if you are having problems.  It's also a good idea to know your test results and keep a list of the medicines you take. Where can you learn more? Go to [email or web address] and sign in to your [employer] account. Enter X742 in the [facility] box to learn more about \"Knee: Exercises. \"     If you do not have an account, please click on the \"Sign Up Now\" link. Current as of: March 2, 2020               Content Version: 12.5  © 2006-2020 [employer]. Care instructions adapted under license by [facility]. If you have questions about a medical condition or this instruction, always ask your healthcare professional. [address] any warranty or liability for your use of this information.

## 2020-10-01 NOTE — PROGRESS NOTES
Knee Arthrocentesis with Injection Procedure Note    Pre-operative Diagnosis: left knee djd with pain    Post-operative Diagnosis: same    Indications: Symptom relief from osteoarthritis    Anesthesia: not required     Procedure Details     Verbal consent was obtained for the procedure. The joint was prepped with chlorhexadine. The area was sprayed with Gebauer's Ethyl Chloride as a topical anesthetic and then a 22 gauge needle was inserted into the superior aspect of the joint from a lateral approach. 2 ml 1% lidocaine and 2 ml of DepoMedrol (40mg/ml) and 1ml Kenalog (40mg/ml)  was then injected into the joint. The needle was removed and the area cleansed and dressed. Complications:  None; patient tolerated the procedure well. Knee Arthrocentesis with Injection Procedure Note    Pre-operative Diagnosis: right knee djd with pain    Post-operative Diagnosis: same    Indications: Symptom relief from osteoarthritis    Anesthesia: not required     Procedure Details     Verbal consent was obtained for the procedure. The joint was prepped with chlorhexadine. The area was sprayed with Gebauer's Ethyl Chloride as a topical anesthetic and then a 22 gauge needle was inserted into the superior aspect of the joint from a lateral approach. 2 ml 1% lidocaine and 2 ml of DepoMedrol (40mg/ml) and 1ml Kenalog (40mg/ml)  was then injected into the joint. The needle was removed and the area cleansed and dressed. Complications:  None; patient tolerated the procedure well.

## 2020-10-01 NOTE — PROGRESS NOTES
[facility] [address] [place], [address]  Phone [phone]   Fax [phone]      OFFICE VISIT: 10/1/2020    Rexene Fothergill R Leroy-: 1975      HPI  Reason For Visit:  Rexene Fothergill is a 50 y.o.     3 Month Follow-Up (needing injections in both knees for pain. ) and Health Maintenance (declined flu shot)    Patient presents with complaints of bilateral knee arthritis. He does get cortisone shots on about 3-month basis in bilateral knees. He is needing repeat cortisone injections in bilateral knees today. Hypertension:   BP today was   BP Readings from Last 1 Encounters:   10/01/20 130/80      Recent BP readings:    BP Readings from Last 3 Encounters:   10/01/20 130/80   20 138/88   20 (!) 132/90     Medication   Lisinopril-hydrochlorothiazide 20-12.5 mg twice daily   Amlodipine 5 mg daily  Medication compliance:  compliant most of the time  Home blood pressure monitoring: No.    He is not adherent to a low sodium diet. Symptoms: none  Laboratory:  Lab Results   Component Value Date    BUN 14 2017    CREATININE 0.7 2017       Hyperlipidemia:   Medication:   atorvastatin (Lipitor)  Low Fat, Low Choleterol Diet:  yes - to some degree  Myalgias or GI upset: no  The patient exercises intermittently. Laboratory:    Lab Results   Component Value Date    CHOL 132 (L) 2017    TRIG 112 (L) 2017    HDL 38 (L) 2017    LDLCALC 72 2017    LDLDIRECT 128 2015      Lab Results   Component Value Date    ALT 24 2017    AST 26 2017       Osteoarthritis:  Medication:   Meloxicam 15 mg daily  Symptoms: He continues to have problems with his knees bilaterally      Obstructive sleep apnea:  He does use CPAP on a regular basis. BMI 44.99  He has lost 4 pounds 4 ounces in the past 3 months. He is down 15 pounds 12 ounces for the year      Impaired glucose tolerance: We do not have a recent hemoglobin A1c on him.   We will order this today       height is 6' 1\" (1.854 m) and weight is 341 lb (154.7 kg) (abnormal). His temporal temperature is 97 °F (36.1 °C). His blood pressure is 130/80 and his pulse is 79. His oxygen saturation is 97%. Body mass index is 44.99 kg/m². I have reviewed the following with the Mr. Gino Maloney   Lab Review  No visits with results within 6 Month(s) from this visit. Latest known visit with results is:   Orders Only on 09/18/2017   Component Date Value    WBC 09/18/2017 11.0*    RBC 09/18/2017 4.77     Hemoglobin 09/18/2017 15.0     Hematocrit 09/18/2017 46.0     MCV 09/18/2017 96.4*    MCH 09/18/2017 31.4*    MCHC 09/18/2017 32.6*    RDW 09/18/2017 14.5     Platelets 89/49/9667 346     MPV 09/18/2017 9.7     Neutrophils % 09/18/2017 69.5*    Lymphocytes % 09/18/2017 20.1     Monocytes % 09/18/2017 7.1     Eosinophils % 09/18/2017 1.5     Basophils % 09/18/2017 0.8     Neutrophils Absolute 09/18/2017 7.6*    Lymphocytes Absolute 09/18/2017 2.2     Monocytes Absolute 09/18/2017 0.80     Eosinophils Absolute 09/18/2017 0.20     Basophils Absolute 09/18/2017 0.10     Sodium 09/18/2017 140     Potassium 09/18/2017 4.1     Chloride 09/18/2017 99     CO2 09/18/2017 23     Anion Gap 09/18/2017 18     Glucose 09/18/2017 88     BUN 09/18/2017 14     CREATININE 09/18/2017 0.7     GFR Non- 09/18/2017 >60     Calcium 09/18/2017 10.0     Total Protein 09/18/2017 7.7     Alb 09/18/2017 4.1     Total Bilirubin 09/18/2017 0.5     Alkaline Phosphatase 09/18/2017 141*    ALT 09/18/2017 24     AST 09/18/2017 26     Cholesterol, Total 09/18/2017 132*    Triglycerides 09/18/2017 112*    HDL 09/18/2017 38*    LDL Calculated 09/18/2017 72     Microalbumin, Random Uri* 09/18/2017 2.00     Creatinine, Ur 09/18/2017 127.5     Microalbumin Creatinine * 09/18/2017 15.7     T4 Free 09/18/2017 1.3     TSH 09/18/2017 0.825      Copies of these are in the chart.     Current Outpatient Medications   Medication Sig Dispense Refill    sildenafil (VIAGRA) 100 MG tablet Take 1 tablet by mouth as needed for Erectile Dysfunction 30 tablet 3    meloxicam (MOBIC) 15 MG tablet Take 1 tablet by mouth daily 90 tablet 3    atorvastatin (LIPITOR) 40 MG tablet Take 1 tablet by mouth nightly 90 tablet 3    lisinopril-hydrochlorothiazide (PRINZIDE;ZESTORETIC) 20-12.5 MG per tablet Take 1 tablet by mouth 2 times daily 180 tablet 3    amLODIPine (NORVASC) 5 MG tablet Take 1 tablet by mouth daily 90 tablet 3    CPAP Machine MISC by Does not apply route nightly      triamcinolone (KENALOG) 0.1 % cream Apply topically 2 times daily. (Patient taking differently: Apply topically 2 times daily as needed Apply topically 2 times daily.) 80 g 5     Current Facility-Administered Medications   Medication Dose Route Frequency Provider Last Rate Last Dose    triamcinolone acetonide (KENALOG-40) injection 40 mg  40 mg Intra-articular Once B Elliottrney Hoots, DO        methylPREDNISolone acetate (DEPO-MEDROL) injection 80 mg  80 mg Intra-articular Once B rrosario ots, DO        triamcinolone acetonide (KENALOG-40) injection 40 mg  40 mg Intra-articular Once B rney Hoots, DO        methylPREDNISolone acetate (DEPO-MEDROL) injection 80 mg  80 mg Intra-articular Once B rney Hoots, DO        triamcinolone acetonide (KENALOG-40) injection 40 mg  40 mg Intra-articular Once B rrosario Hoots, DO        methylPREDNISolone acetate (DEPO-MEDROL) injection 80 mg  80 mg Intra-articular Once B Elliottrrosario Hoots, DO           Allergies: Patient has no known allergies. Past Medical History:   Diagnosis Date    ED (erectile dysfunction)     Fatigue     Hyperlipidemia     Hypertension     Obesity     Osteoarthrosis     Snoring        No family history on file.     Past Surgical History:   Procedure Laterality Date    ARM SURGERY Left     broken     KNEE SURGERY      Scope     VASECTOMY Social History     Tobacco Use    Smoking status: Former Smoker     Packs/day: 0.50     Years: 10.00     Pack years: 5.00     Last attempt to quit: 2003     Years since quittin.8    Smokeless tobacco: Former User   Substance Use Topics    Alcohol use: No        Review of Systems   Constitutional: Positive for activity change ( He is trying to exercise more but limited by his knees). Negative for chills, fatigue and fever. HENT: Negative for congestion, ear pain and sore throat. Eyes: Negative for visual disturbance. Respiratory: Negative for cough, chest tightness, shortness of breath and wheezing. Uses CPAP regularly   Cardiovascular: Negative for chest pain, palpitations and leg swelling. Gastrointestinal: Negative for abdominal pain, diarrhea, nausea and vomiting. Endocrine: Negative for polyuria. Genitourinary: Negative for frequency and urgency. Musculoskeletal: Positive for arthralgias (bilateral knees. These are not as bad as they were last time. ). Negative for back pain and neck pain. Skin: Negative for rash. Neurological: Negative for dizziness and headaches. Psychiatric/Behavioral: Negative for self-injury. The patient is not nervous/anxious. Physical Exam  Constitutional:       General: He is not in acute distress. Appearance: Normal appearance. He is well-developed. He is obese. HENT:      Head: Normocephalic and atraumatic. Right Ear: Ear canal and external ear normal.      Left Ear: Ear canal and external ear normal.      Nose: Nose normal.      Mouth/Throat:      Mouth: Mucous membranes are moist.      Pharynx: Oropharynx is clear. Eyes:      General: No scleral icterus. Extraocular Movements: Extraocular movements intact. Conjunctiva/sclera: Conjunctivae normal.      Pupils: Pupils are equal, round, and reactive to light. Neck:      Musculoskeletal: Normal range of motion and neck supple. Thyroid: No thyromegaly. Vascular: No carotid bruit or JVD. Cardiovascular:      Rate and Rhythm: Normal rate and regular rhythm. No extrasystoles are present. Chest Wall: PMI is not displaced. Pulses: Normal pulses. Heart sounds: Normal heart sounds, S1 normal and S2 normal. No murmur. No friction rub. No gallop. Pulmonary:      Effort: Pulmonary effort is normal. No respiratory distress. Breath sounds: Normal breath sounds. No wheezing, rhonchi or rales. Abdominal:      General: Bowel sounds are normal.      Palpations: Abdomen is soft. Tenderness: There is no abdominal tenderness. There is no guarding or rebound. Genitourinary:     Comments: Exam deferred  Musculoskeletal: Normal range of motion. General: No tenderness. Comments: Crepitus in bilateral knees. Some occasional popping with movement   Lymphadenopathy:      Cervical: No cervical adenopathy. Skin:     General: Skin is warm and dry. Capillary Refill: Capillary refill takes less than 2 seconds. Findings: No rash. Neurological:      General: No focal deficit present. Mental Status: He is alert and oriented to person, place, and time. Sensory: No sensory deficit (no numbness or tingling). Psychiatric:         Mood and Affect: Mood normal.         Behavior: Behavior normal.           ASSESSMENT      ICD-10-CM    1. Essential hypertension  I10 CBC Auto Differential     Comprehensive Metabolic Panel     Microalbumin / Creatinine Urine Ratio   2. Mixed hyperlipidemia  E78.2 Lipid Panel   3. BMI 40.0-44.9, adult (HCC)  Z68.41 FREE T4     TSH without Reflex   4. Primary osteoarthritis of right knee  M17.11    5. Primary osteoarthritis of left knee  M17.12    6. Impaired glucose tolerance  R73.02 FREE T4     TSH without Reflex     Hemoglobin A1C         PLAN    1. Essential hypertension  Will check labs  - CBC Auto Differential; Future  - Comprehensive Metabolic Panel;  Future  - Microalbumin / Creatinine Urine Ratio; Future    2. Mixed hyperlipidemia  Will treat with lipitor  - Lipid Panel; Future    3. BMI 40.0-44.9, adult ([address]  He is losing some weight  - FREE T4; Future  - TSH without Reflex; Future    4. Primary osteoarthritis of right knee  Will inject today    5. Primary osteoarthritis of left knee  Will inject today    6. Impaired glucose tolerance  Will check A1C will also check thyroid  - FREE T4; Future  - TSH without Reflex; Future  - Hemoglobin A1C; Future      Orders Placed This Encounter   Procedures    CBC Auto Differential    Comprehensive Metabolic Panel    FREE T4    Lipid Panel    Microalbumin / Creatinine Urine Ratio    TSH without Reflex    Hemoglobin A1C        Return in about 3 months (around 1/1/2021) for 30. This was an in-house visit.

## 2020-11-30 RX ORDER — AMLODIPINE BESYLATE 5 MG/1
5 TABLET ORAL DAILY
Qty: 90 TABLET | Refills: 3 | Status: SHIPPED | OUTPATIENT
Start: 2020-11-30 | End: 2021-12-20 | Stop reason: SDUPTHER

## 2020-11-30 NOTE — TELEPHONE ENCOUNTER
Received fax from pharmacy requesting refill on pts medication(s). Pt was last seen in office on 10/1/2020  and has a follow up scheduled for 1/5/2021. Will send request to  Maylin Oliva  for authorization.      Requested Prescriptions     Pending Prescriptions Disp Refills    amLODIPine (NORVASC) 5 MG tablet 90 tablet 3     Sig: Take 1 tablet by mouth daily

## 2020-12-18 ENCOUNTER — TELEPHONE (OUTPATIENT)
Dept: PRIMARY CARE CLINIC | Age: 45
End: 2020-12-18

## 2020-12-22 RX ORDER — ATORVASTATIN CALCIUM 40 MG/1
40 TABLET, FILM COATED ORAL NIGHTLY
Qty: 90 TABLET | Refills: 3 | OUTPATIENT
Start: 2020-12-22

## 2020-12-22 RX ORDER — LISINOPRIL AND HYDROCHLOROTHIAZIDE 20; 12.5 MG/1; MG/1
1 TABLET ORAL 2 TIMES DAILY
Qty: 180 TABLET | Refills: 3 | OUTPATIENT
Start: 2020-12-22

## 2020-12-22 RX ORDER — MELOXICAM 15 MG/1
15 TABLET ORAL DAILY
Qty: 90 TABLET | Refills: 3 | Status: SHIPPED | OUTPATIENT
Start: 2020-12-22 | End: 2022-01-14 | Stop reason: SDUPTHER

## 2020-12-22 RX ORDER — ATORVASTATIN CALCIUM 40 MG/1
40 TABLET, FILM COATED ORAL NIGHTLY
Qty: 90 TABLET | Refills: 3 | Status: SHIPPED | OUTPATIENT
Start: 2020-12-22 | End: 2022-01-14 | Stop reason: SDUPTHER

## 2020-12-22 RX ORDER — MELOXICAM 15 MG/1
15 TABLET ORAL DAILY
Qty: 90 TABLET | Refills: 3 | OUTPATIENT
Start: 2020-12-22

## 2020-12-22 RX ORDER — LISINOPRIL AND HYDROCHLOROTHIAZIDE 20; 12.5 MG/1; MG/1
1 TABLET ORAL 2 TIMES DAILY
Qty: 180 TABLET | Refills: 3 | Status: SHIPPED | OUTPATIENT
Start: 2020-12-22 | End: 2021-07-08 | Stop reason: DRUGHIGH

## 2020-12-22 NOTE — TELEPHONE ENCOUNTER
Received fax from pharmacy requesting refill on pts medication(s). Pt was last seen in office on 10/1/2020  and has a follow up scheduled for 12/21/2020. Will send request to  Guillermina Farah  for patient.      Requested Prescriptions     Pending Prescriptions Disp Refills    atorvastatin (LIPITOR) 40 MG tablet 90 tablet 3     Sig: Take 1 tablet by mouth nightly

## 2021-01-05 ENCOUNTER — OFFICE VISIT (OUTPATIENT)
Dept: PRIMARY CARE CLINIC | Age: 46
End: 2021-01-05
Payer: COMMERCIAL

## 2021-01-05 VITALS
HEART RATE: 98 BPM | HEIGHT: 73 IN | DIASTOLIC BLOOD PRESSURE: 80 MMHG | SYSTOLIC BLOOD PRESSURE: 122 MMHG | BODY MASS INDEX: 41.75 KG/M2 | OXYGEN SATURATION: 96 % | WEIGHT: 315 LBS | TEMPERATURE: 97.1 F

## 2021-01-05 DIAGNOSIS — R73.02 IMPAIRED GLUCOSE TOLERANCE: ICD-10-CM

## 2021-01-05 DIAGNOSIS — I10 ESSENTIAL HYPERTENSION: ICD-10-CM

## 2021-01-05 DIAGNOSIS — M17.12 PRIMARY OSTEOARTHRITIS OF LEFT KNEE: Primary | ICD-10-CM

## 2021-01-05 DIAGNOSIS — Z00.00 VISIT FOR PREVENTIVE HEALTH EXAMINATION: ICD-10-CM

## 2021-01-05 DIAGNOSIS — M17.11 PRIMARY OSTEOARTHRITIS OF RIGHT KNEE: ICD-10-CM

## 2021-01-05 DIAGNOSIS — E66.01 CLASS 3 SEVERE OBESITY DUE TO EXCESS CALORIES WITH SERIOUS COMORBIDITY AND BODY MASS INDEX (BMI) OF 40.0 TO 44.9 IN ADULT (HCC): ICD-10-CM

## 2021-01-05 DIAGNOSIS — R53.83 FATIGUE, UNSPECIFIED TYPE: ICD-10-CM

## 2021-01-05 DIAGNOSIS — E78.2 MIXED HYPERLIPIDEMIA: ICD-10-CM

## 2021-01-05 LAB
ALBUMIN SERPL-MCNC: 4.5 G/DL (ref 3.5–5.2)
ALP BLD-CCNC: 177 U/L (ref 40–130)
ALT SERPL-CCNC: 49 U/L (ref 5–41)
ANION GAP SERPL CALCULATED.3IONS-SCNC: 19 MMOL/L (ref 7–19)
AST SERPL-CCNC: 72 U/L (ref 5–40)
BASOPHILS ABSOLUTE: 0.1 K/UL (ref 0–0.2)
BASOPHILS RELATIVE PERCENT: 0.9 % (ref 0–1)
BILIRUB SERPL-MCNC: 0.8 MG/DL (ref 0.2–1.2)
BUN BLDV-MCNC: 11 MG/DL (ref 6–20)
CALCIUM SERPL-MCNC: 10.5 MG/DL (ref 8.6–10)
CHLORIDE BLD-SCNC: 102 MMOL/L (ref 98–111)
CHOLESTEROL, TOTAL: 151 MG/DL (ref 160–199)
CO2: 20 MMOL/L (ref 22–29)
CREAT SERPL-MCNC: 0.7 MG/DL (ref 0.5–1.2)
CREATININE URINE: 174 MG/DL (ref 4.2–622)
EOSINOPHILS ABSOLUTE: 0.3 K/UL (ref 0–0.6)
EOSINOPHILS RELATIVE PERCENT: 2.5 % (ref 0–5)
GFR AFRICAN AMERICAN: >59
GFR NON-AFRICAN AMERICAN: >60
GLUCOSE BLD-MCNC: 220 MG/DL (ref 74–109)
HBA1C MFR BLD: 9.2 % (ref 4–6)
HCT VFR BLD CALC: 49.2 % (ref 42–52)
HDLC SERPL-MCNC: 36 MG/DL (ref 55–121)
HEMOGLOBIN: 16.2 G/DL (ref 14–18)
IMMATURE GRANULOCYTES #: 0.1 K/UL
LDL CHOLESTEROL CALCULATED: 81 MG/DL
LYMPHOCYTES ABSOLUTE: 2.4 K/UL (ref 1.1–4.5)
LYMPHOCYTES RELATIVE PERCENT: 22.9 % (ref 20–40)
MCH RBC QN AUTO: 31 PG (ref 27–31)
MCHC RBC AUTO-ENTMCNC: 32.9 G/DL (ref 33–37)
MCV RBC AUTO: 94.1 FL (ref 80–94)
MICROALBUMIN UR-MCNC: 4.1 MG/DL (ref 0–19)
MICROALBUMIN/CREAT UR-RTO: 23.6 MG/G
MONOCYTES ABSOLUTE: 0.8 K/UL (ref 0–0.9)
MONOCYTES RELATIVE PERCENT: 7.5 % (ref 0–10)
NEUTROPHILS ABSOLUTE: 6.9 K/UL (ref 1.5–7.5)
NEUTROPHILS RELATIVE PERCENT: 65.3 % (ref 50–65)
PDW BLD-RTO: 14.1 % (ref 11.5–14.5)
PLATELET # BLD: 379 K/UL (ref 130–400)
PMV BLD AUTO: 9.9 FL (ref 9.4–12.4)
POTASSIUM SERPL-SCNC: 3.7 MMOL/L (ref 3.5–5)
RBC # BLD: 5.23 M/UL (ref 4.7–6.1)
SODIUM BLD-SCNC: 141 MMOL/L (ref 136–145)
T4 FREE: 1.34 NG/DL (ref 0.93–1.7)
TOTAL PROTEIN: 8.4 G/DL (ref 6.6–8.7)
TRIGL SERPL-MCNC: 170 MG/DL (ref 0–149)
TSH SERPL DL<=0.05 MIU/L-ACNC: 1.8 UIU/ML (ref 0.27–4.2)
WBC # BLD: 10.5 K/UL (ref 4.8–10.8)

## 2021-01-05 PROCEDURE — 20610 DRAIN/INJ JOINT/BURSA W/O US: CPT | Performed by: PEDIATRICS

## 2021-01-05 PROCEDURE — 99396 PREV VISIT EST AGE 40-64: CPT | Performed by: PEDIATRICS

## 2021-01-05 RX ORDER — TRIAMCINOLONE ACETONIDE 40 MG/ML
40 INJECTION, SUSPENSION INTRA-ARTICULAR; INTRAMUSCULAR ONCE
Status: COMPLETED | OUTPATIENT
Start: 2021-01-05 | End: 2021-01-05

## 2021-01-05 RX ORDER — METHYLPREDNISOLONE ACETATE 80 MG/ML
80 INJECTION, SUSPENSION INTRA-ARTICULAR; INTRALESIONAL; INTRAMUSCULAR; SOFT TISSUE ONCE
Status: COMPLETED | OUTPATIENT
Start: 2021-01-05 | End: 2021-01-05

## 2021-01-05 RX ADMIN — TRIAMCINOLONE ACETONIDE 40 MG: 40 INJECTION, SUSPENSION INTRA-ARTICULAR; INTRAMUSCULAR at 14:06

## 2021-01-05 RX ADMIN — METHYLPREDNISOLONE ACETATE 80 MG: 80 INJECTION, SUSPENSION INTRA-ARTICULAR; INTRALESIONAL; INTRAMUSCULAR; SOFT TISSUE at 14:05

## 2021-01-05 RX ADMIN — TRIAMCINOLONE ACETONIDE 40 MG: 40 INJECTION, SUSPENSION INTRA-ARTICULAR; INTRAMUSCULAR at 14:09

## 2021-01-05 RX ADMIN — METHYLPREDNISOLONE ACETATE 80 MG: 80 INJECTION, SUSPENSION INTRA-ARTICULAR; INTRALESIONAL; INTRAMUSCULAR; SOFT TISSUE at 14:08

## 2021-01-05 SDOH — ECONOMIC STABILITY: INCOME INSECURITY: HOW HARD IS IT FOR YOU TO PAY FOR THE VERY BASICS LIKE FOOD, HOUSING, MEDICAL CARE, AND HEATING?: NOT HARD AT ALL

## 2021-01-05 SDOH — ECONOMIC STABILITY: TRANSPORTATION INSECURITY
IN THE PAST 12 MONTHS, HAS LACK OF TRANSPORTATION KEPT YOU FROM MEETINGS, WORK, OR FROM GETTING THINGS NEEDED FOR DAILY LIVING?: NO

## 2021-01-05 SDOH — ECONOMIC STABILITY: FOOD INSECURITY: WITHIN THE PAST 12 MONTHS, THE FOOD YOU BOUGHT JUST DIDN'T LAST AND YOU DIDN'T HAVE MONEY TO GET MORE.: NOT ASKED

## 2021-01-05 SDOH — ECONOMIC STABILITY: TRANSPORTATION INSECURITY
IN THE PAST 12 MONTHS, HAS THE LACK OF TRANSPORTATION KEPT YOU FROM MEDICAL APPOINTMENTS OR FROM GETTING MEDICATIONS?: NO

## 2021-01-05 ASSESSMENT — ENCOUNTER SYMPTOMS
CHEST TIGHTNESS: 0
VOMITING: 0
SORE THROAT: 0
ABDOMINAL PAIN: 0
SHORTNESS OF BREATH: 0
WHEEZING: 0
COUGH: 0
BACK PAIN: 0
NAUSEA: 0
DIARRHEA: 0

## 2021-01-05 ASSESSMENT — PATIENT HEALTH QUESTIONNAIRE - PHQ9
SUM OF ALL RESPONSES TO PHQ QUESTIONS 1-9: 0
SUM OF ALL RESPONSES TO PHQ QUESTIONS 1-9: 0

## 2021-01-05 NOTE — PROGRESS NOTES
[address] [facility],  [address]  Phone [phone]   Fax [phone]      OFFICE VISIT: 2021    Kelly Oates R [place]-: 1975      HPI  Reason For Visit:  Kelly Oates is a 50 y.o.     3 Month Follow-Up (Knee pain in both knees, would like injections )    Patient presents on follow-up for knee arthritis. He has been getting injections in his knees and this has been very helpful for him. He would like to have this done again today. Last knee injection was on 10/1/2020. These injections are very helpful for him. He does not feel that he would be able to manage without. Adjunctive medications:   Meloxicam 15 mg daily      Hypertension:   BP today was   BP Readings from Last 1 Encounters:   21 122/80      Recent BP readings:    BP Readings from Last 3 Encounters:   21 122/80   10/01/20 130/80   20 138/88     Medication   Lisinoprilhydrochlorothiazide 20-12.5 mg twice daily   Amlodipine 5 mg daily  Medication compliance:  compliant most of the time  Home blood pressure monitoring: Yes -and well-controlled. He Is somewhat adherent to a low sodium diet. Symptoms: none  Laboratory:  Lab Results   Component Value Date    BUN 14 2017    CREATININE 0.7 2017       Hyperlipidemia:   Medication:   atorvastatin (Lipitor)  Low Fat, Low Choleterol Diet:  yes -he tries  Myalgias or GI upset: no  The patient exercises intermittently. Laboratory:    Lab Results   Component Value Date    CHOL 132 (L) 2017    TRIG 112 (L) 2017    HDL 38 (L) 2017    LDLCALC 72 2017    LDLDIRECT 128 2015      Lab Results   Component Value Date    ALT 24 2017    AST 26 2017       Morbid obesity:  He has lost 11 pounds in the past 3 months. He is trying to exercise and watch his diet as best he can. He is done a really good job considering the time of the year. Over the past year he has lost over 26 pounds.       He does have labs pending from October 1, 2020. He has not had these done as of yet. Most recent labs were performed on September 18, 2017. We have been ordering labs consistently for the past 3 years. I did stress the importance of laboratory evaluations in assessing health risk and treatment regimen efficacy. height is 6' 1\" (1.854 m) and weight is 330 lb (149.7 kg) (abnormal). His temporal temperature is 97.1 °F (36.2 °C). His blood pressure is 122/80 and his pulse is 98. His oxygen saturation is 96%. Body mass index is 43.54 kg/m². I have reviewed the following with the Mr. Carrasco Or   Lab Review  No visits with results within 6 Month(s) from this visit.    Latest known visit with results is:   Orders Only on 09/18/2017   Component Date Value    WBC 09/18/2017 11.0*    RBC 09/18/2017 4.77     Hemoglobin 09/18/2017 15.0     Hematocrit 09/18/2017 46.0     MCV 09/18/2017 96.4*    MCH 09/18/2017 31.4*    MCHC 09/18/2017 32.6*    RDW 09/18/2017 14.5     Platelets 87/49/3373 346     MPV 09/18/2017 9.7     Neutrophils % 09/18/2017 69.5*    Lymphocytes % 09/18/2017 20.1     Monocytes % 09/18/2017 7.1     Eosinophils % 09/18/2017 1.5     Basophils % 09/18/2017 0.8     Neutrophils Absolute 09/18/2017 7.6*    Lymphocytes Absolute 09/18/2017 2.2     Monocytes Absolute 09/18/2017 0.80     Eosinophils Absolute 09/18/2017 0.20     Basophils Absolute 09/18/2017 0.10     Sodium 09/18/2017 140     Potassium 09/18/2017 4.1     Chloride 09/18/2017 99     CO2 09/18/2017 23     Anion Gap 09/18/2017 18     Glucose 09/18/2017 88     BUN 09/18/2017 14     CREATININE 09/18/2017 0.7     GFR Non- 09/18/2017 >60     Calcium 09/18/2017 10.0     Total Protein 09/18/2017 7.7     Alb 09/18/2017 4.1     Total Bilirubin 09/18/2017 0.5     Alkaline Phosphatase 09/18/2017 141*    ALT 09/18/2017 24     AST 09/18/2017 26     Cholesterol, Total 09/18/2017 132*    Triglycerides 09/18/2017 112*    HDL 09/18/2017 38*    LDL Calculated 09/18/2017 72     Microalbumin, Random Uri* 09/18/2017 2.00     Creatinine, Ur 09/18/2017 127.5     Microalbumin Creatinine * 09/18/2017 15.7     T4 Free 09/18/2017 1.3     TSH 09/18/2017 0.825      Copies of these are in the chart. Current Outpatient Medications   Medication Sig Dispense Refill    atorvastatin (LIPITOR) 40 MG tablet Take 1 tablet by mouth nightly 90 tablet 3    meloxicam (MOBIC) 15 MG tablet Take 1 tablet by mouth daily 90 tablet 3    lisinopril-hydroCHLOROthiazide (PRINZIDE;ZESTORETIC) 20-12.5 MG per tablet Take 1 tablet by mouth 2 times daily 180 tablet 3    amLODIPine (NORVASC) 5 MG tablet Take 1 tablet by mouth daily 90 tablet 3    sildenafil (VIAGRA) 100 MG tablet Take 1 tablet by mouth as needed for Erectile Dysfunction 30 tablet 3    CPAP Machine MISC by Does not apply route nightly      triamcinolone (KENALOG) 0.1 % cream Apply topically 2 times daily. (Patient taking differently: Apply topically 2 times daily as needed Apply topically 2 times daily.) 80 g 5     Current Facility-Administered Medications   Medication Dose Route Frequency Provider Last Rate Last Admin    triamcinolone acetonide (KENALOG-40) injection 40 mg  40 mg Intra-articular Once B Halle Brawn, DO        methylPREDNISolone acetate (DEPO-MEDROL) injection 80 mg  80 mg Intra-articular Once B Halle Brawn, DO        triamcinolone acetonide (KENALOG-40) injection 40 mg  40 mg Intra-articular Once B Halle Brawn, DO        methylPREDNISolone acetate (DEPO-MEDROL) injection 80 mg  80 mg Intra-articular Once B Halle Brawn, DO           Allergies: Patient has no known allergies. Past Medical History:   Diagnosis Date    ED (erectile dysfunction)     Fatigue     Hyperlipidemia     Hypertension     Obesity     Osteoarthrosis     Snoring        No family history on file.     Past Surgical History:   Procedure Laterality Date    ARM SURGERY Left     broken  KNEE SURGERY      Scope     VASECTOMY         Social History     Tobacco Use    Smoking status: Former Smoker     Packs/day: 0.50     Years: 10.00     Pack years: 5.00     Quit date: 2003     Years since quittin.1    Smokeless tobacco: Former User   Substance Use Topics    Alcohol use: No        Review of Systems   Constitutional: Positive for activity change ( He is trying to exercise more but limited by his knees). Negative for chills, fatigue and fever. HENT: Negative for congestion, ear pain and sore throat. Eyes: Negative for visual disturbance. Respiratory: Negative for cough, chest tightness, shortness of breath and wheezing. Uses CPAP regularly   Cardiovascular: Negative for chest pain, palpitations and leg swelling. Gastrointestinal: Negative for abdominal pain, diarrhea, nausea and vomiting. Endocrine: Negative for polyuria. Genitourinary: Negative for frequency and urgency. Musculoskeletal: Positive for arthralgias (bilateral knees. These are not as bad as they were last time. ). Negative for back pain and neck pain. Skin: Negative for rash. Neurological: Negative for dizziness and headaches. Psychiatric/Behavioral: Negative for self-injury. The patient is not nervous/anxious. Physical Exam  Constitutional:       General: He is not in acute distress. Appearance: Normal appearance. He is well-developed. He is obese. HENT:      Head: Normocephalic and atraumatic. Right Ear: Ear canal and external ear normal.      Left Ear: Ear canal and external ear normal.      Nose: Nose normal.      Mouth/Throat:      Mouth: Mucous membranes are moist.      Pharynx: Oropharynx is clear. Eyes:      General: No scleral icterus. Extraocular Movements: Extraocular movements intact. Conjunctiva/sclera: Conjunctivae normal.      Pupils: Pupils are equal, round, and reactive to light. Neck:      Musculoskeletal: Normal range of motion and neck supple. Thyroid: No thyromegaly. Vascular: No carotid bruit or JVD. Cardiovascular:      Rate and Rhythm: Normal rate and regular rhythm. No extrasystoles are present. Chest Wall: PMI is not displaced. Pulses: Normal pulses. Heart sounds: Normal heart sounds, S1 normal and S2 normal. No murmur. No friction rub. No gallop. Pulmonary:      Effort: Pulmonary effort is normal. No respiratory distress. Breath sounds: Normal breath sounds. No wheezing, rhonchi or rales. Abdominal:      General: Bowel sounds are normal.      Palpations: Abdomen is soft. Tenderness: There is no abdominal tenderness. There is no guarding or rebound. Genitourinary:     Comments: Exam deferred  Musculoskeletal: Normal range of motion. General: No tenderness. Comments: Crepitus in bilateral knees. Some occasional popping with movement   Lymphadenopathy:      Cervical: No cervical adenopathy. Skin:     General: Skin is warm and dry. Capillary Refill: Capillary refill takes less than 2 seconds. Findings: No rash. Neurological:      General: No focal deficit present. Mental Status: He is alert and oriented to person, place, and time. Sensory: No sensory deficit (no numbness or tingling). Psychiatric:         Mood and Affect: Mood normal.         Behavior: Behavior normal.         ASSESSMENT      ICD-10-CM    1. Primary osteoarthritis of left knee  M17.12 triamcinolone acetonide (KENALOG-40) injection 40 mg     methylPREDNISolone acetate (DEPO-MEDROL) injection 80 mg     MI DRAIN/INJECT LARGE JOINT/BURSA   2. Primary osteoarthritis of right knee  M17.11 triamcinolone acetonide (KENALOG-40) injection 40 mg     methylPREDNISolone acetate (DEPO-MEDROL) injection 80 mg     MI DRAIN/INJECT LARGE JOINT/BURSA   3. Class 3 severe obesity due to excess calories with serious comorbidity and body mass index (BMI) of 40.0 to 44.9 in adult ([facility]  E66.01     Z68.41    4.  Impaired glucose tolerance  R73.02 Hemoglobin A1C   5. Essential hypertension  I10 CBC Auto Differential     Comprehensive Metabolic Panel     Microalbumin / Creatinine Urine Ratio   6. Mixed hyperlipidemia  E78.2 Lipid Panel   7. Fatigue, unspecified type  R53.83 T4, Free     TSH without Reflex   8. Visit for preventive health examination  Z00.00 CBC Auto Differential     Comprehensive Metabolic Panel     Hemoglobin A1C     Lipid Panel     T4, Free     TSH without Reflex     Microalbumin / Creatinine Urine Ratio         PLAN    1. Primary osteoarthritis of left knee  We injected his left knee today  - triamcinolone acetonide (KENALOG-40) injection 40 mg  - methylPREDNISolone acetate (DEPO-MEDROL) injection 80 mg  - MT DRAIN/INJECT LARGE JOINT/BURSA    2. Primary osteoarthritis of right knee  We injected his right knee today  - triamcinolone acetonide (KENALOG-40) injection 40 mg  - methylPREDNISolone acetate (DEPO-MEDROL) injection 80 mg  - MT DRAIN/INJECT LARGE JOINT/BURSA    3. Class 3 severe obesity due to excess calories with serious comorbidity and body mass index (BMI) of 40.0 to 44.9 in [facility])  He has been actively working on weight loss. He is losing weight as noted above in the HPI. I encouraged him to continue doing what he is doing. Hopefully with his increased mobility along his knee injections he can continue to increase his exercise component of his weight loss regimen. 4. Impaired glucose tolerance  We do need to measure his hemoglobin A1c. It has been several years since his last Lab determination  - Hemoglobin A1C; Future    5. Essential hypertension  Blood pressure is excellently controlled on present medication regimen. We will continue amlodipine as before  - CBC Auto Differential; Future  - Comprehensive Metabolic Panel; Future  - Microalbumin / Creatinine Urine Ratio; Future    6. Mixed hyperlipidemia  We will need to check his lipids on present therapy.   Has been several years since his last lipid value determination  - Lipid Panel; Future    7. Fatigue, unspecified type  Check thyroid levels  - T4, Free; Future  - TSH without Reflex; Future    8. Visit for preventive health examination  Routine age-appropriate anticipatory guidance was provided. Annual wellness visit was performed in a separate note and issues were addressed as identified.   - CBC Auto Differential; Future  - Comprehensive Metabolic Panel; Future  - Hemoglobin A1C; Future  - Lipid Panel; Future  - T4, Free; Future  - TSH without Reflex; Future  - Microalbumin / Creatinine Urine Ratio; Future      Orders Placed This Encounter   Procedures    CBC Auto Differential    Comprehensive Metabolic Panel    Hemoglobin A1C    Lipid Panel    T4, Free    TSH without Reflex    Microalbumin / Creatinine Urine Ratio    RI DRAIN/INJECT LARGE JOINT/BURSA    RI DRAIN/INJECT LARGE JOINT/BURSA        Return in about 3 months (around 4/5/2021). Knee Arthrocentesis with Injection Procedure Note    Pre-operative Diagnosis: left knee djd with pain    Post-operative Diagnosis: same    Indications: Symptom relief from osteoarthritis    Anesthesia: not required     Procedure Details     Verbal consent was obtained for the procedure. The joint was prepped with chlorhexadine. The area was sprayed with Gebauer's Ethyl Chloride as a topical anesthetic and then a 22 gauge needle was inserted into the superior aspect of the joint from a lateral approach. 2 ml 1% lidocaine and 2 ml of DepoMedrol (40mg/ml) and 1ml Kenalog (40mg/ml)  was then injected into the joint. The needle was removed and the area cleansed and dressed. Complications:  None; patient tolerated the procedure well.         Knee Arthrocentesis with Injection Procedure Note    Pre-operative Diagnosis: right knee djd with pain    Post-operative Diagnosis: same    Indications: Symptom relief from osteoarthritis    Anesthesia: not required     Procedure Details     Verbal consent was obtained for the procedure. The joint was prepped with chlorhexadine. The area was sprayed with Gebauer's Ethyl Chloride as a topical anesthetic and then a 22 gauge needle was inserted into the superior aspect of the joint from a lateral approach. 2 ml 1% lidocaine and 2 ml of DepoMedrol (40mg/ml) and 1ml Kenalog (40mg/ml)  was then injected into the joint. The needle was removed and the area cleansed and dressed. Complications:  None; patient tolerated the procedure well.     This was an in-house visit      Lasix virtual also has good

## 2021-01-06 ENCOUNTER — TELEPHONE (OUTPATIENT)
Dept: PRIMARY CARE CLINIC | Age: 46
End: 2021-01-06

## 2021-01-06 DIAGNOSIS — R74.8 ELEVATED LIVER ENZYMES: Primary | ICD-10-CM

## 2021-01-06 NOTE — TELEPHONE ENCOUNTER
----- Message from [address] [facility],[address], DO sent at 1/5/2021  7:49 PM CST -----  There is no significant protein excretion in urine. Lipids are fairly well controlled. Your metabolic profile is normal.  This includes kidney and liver functions as well as electrolytes. Blood sugar was 220 at the time of the lab draw. Liver enzymes are also significantly elevated. Please order acute hepatitis panel    Hemoglobin A1c is 9.2 which is consistent with uncontrolled diabetes. Please schedule an appointment to come back in so we can discuss what we need to do to get your blood sugars under control. Thyroid values are normal.  Your WBC, (infection fighting ability) Hgb and Hct, (oxygen carrying cells) are normal; as is your percentage of each cell type.

## 2021-01-07 ENCOUNTER — TELEPHONE (OUTPATIENT)
Dept: PRIMARY CARE CLINIC | Age: 46
End: 2021-01-07

## 2021-01-07 DIAGNOSIS — R74.8 ELEVATED LIVER ENZYMES: ICD-10-CM

## 2021-01-07 LAB
HAV IGM SER IA-ACNC: NORMAL
HEPATITIS B CORE IGM ANTIBODY: NORMAL
HEPATITIS B SURFACE ANTIGEN INTERPRETATION: NORMAL
HEPATITIS C ANTIBODY INTERPRETATION: NORMAL

## 2021-01-07 NOTE — TELEPHONE ENCOUNTER
----- Message from RENA Tomas sent at 1/7/2021 12:53 PM CST -----  Please call patient and let them know results.    Negative hepatitis panel

## 2021-01-13 ENCOUNTER — TELEMEDICINE (OUTPATIENT)
Dept: PRIMARY CARE CLINIC | Age: 46
End: 2021-01-13
Payer: COMMERCIAL

## 2021-01-13 DIAGNOSIS — E11.9 TYPE 2 DIABETES MELLITUS WITHOUT COMPLICATION, WITHOUT LONG-TERM CURRENT USE OF INSULIN (HCC): Primary | ICD-10-CM

## 2021-01-13 PROCEDURE — 99214 OFFICE O/P EST MOD 30 MIN: CPT | Performed by: PEDIATRICS

## 2021-01-13 ASSESSMENT — ENCOUNTER SYMPTOMS
NAUSEA: 0
CHEST TIGHTNESS: 0
SHORTNESS OF BREATH: 0
SORE THROAT: 0
DIARRHEA: 0
COUGH: 0
VOMITING: 0
WHEEZING: 0
ABDOMINAL PAIN: 0
BACK PAIN: 0

## 2021-01-14 NOTE — PATIENT INSTRUCTIONS
· If you are not sure how to count carbohydrate grams, use the Plate Method to plan meals. It is a good, quick way to make sure that you have a balanced meal. It also helps you spread carbs throughout the day. ? Divide your plate by types of foods. Put non-starchy vegetables on half the plate, meat or other protein food on one-quarter of the plate, and a grain or starchy vegetable in the final quarter of the plate. To this you can add a small piece of fruit and 1 cup of milk or yogurt, depending on how many carbs you are supposed to eat at a meal.  · Try to eat about the same amount of carbs at each meal. Do not \"save up\" your daily allowance of carbs to eat at one meal.  · Proteins have very little or no carbs per serving. Examples of proteins are beef, chicken, turkey, fish, eggs, tofu, cheese, cottage cheese, and peanut butter. A serving size of meat is 3 ounces, which is about the size of a deck of cards. Examples of meat substitute serving sizes (equal to 1 ounce of meat) are 1/4 cup of cottage cheese, 1 egg, 1 tablespoon of peanut butter, and ½ cup of tofu. How can you eat out and still eat healthy? · Learn to estimate the serving sizes of foods that have carbohydrate. If you measure food at home, it will be easier to estimate the amount in a serving of restaurant food. · If the meal you order has too much carbohydrate (such as potatoes, corn, or baked beans), ask to have a low-carbohydrate food instead. Ask for a salad or green vegetables. · If you use insulin, check your blood sugar before and after eating out to help you plan how much to eat in the future. · If you eat more carbohydrate at a meal than you had planned, take a walk or do other exercise. This will help lower your blood sugar. What else should you know? · Limit saturated fat, such as the fat from meat and dairy products. This is a healthy choice because people who have diabetes are at higher risk of heart disease. So choose lean cuts of meat and nonfat or low-fat dairy products. Use olive or canola oil instead of butter or shortening when cooking. · Don't skip meals. Your blood sugar may drop too low if you skip meals and take insulin or certain medicines for diabetes. · Check with your doctor before you drink alcohol. Alcohol can cause your blood sugar to drop too low. Alcohol can also cause a bad reaction if you take certain diabetes medicines. Follow-up care is a key part of your treatment and safety. Be sure to make and go to all appointments, and call your doctor if you are having problems. It's also a good idea to know your test results and keep a list of the medicines you take. Where can you learn more? Go to https://miLibrispepiceweb.Apse. org and sign in to your [employer] account. Enter E041 in the [employer]Generations box to learn more about \"Learning About Diabetes Food Guidelines. \"     If you do not have an account, please click on the \"Sign Up Now\" link. Current as of: December 20, 2019               Content Version: 12.6  © 6431-0052 [employer]. Care instructions adapted under license by [facility] ([facility]). If you have questions about a medical condition or this instruction, always ask your healthcare professional. [place] [address] any warranty or liability for your use of this information. Patient Education        Learning About Meal Planning for Diabetes  Why plan your meals? Meal planning can be a key part of managing diabetes. Planning meals and snacks with the right balance of carbohydrate, protein, and fat can help you keep your blood sugar at the target level you set with your doctor. You don't have to eat special foods. You can eat what your family eats, including sweets once in a while. But you do have to pay attention to how often you eat and how much you eat of certain foods. You may want to work with a dietitian or a certified diabetes educator. He or she can give you tips and meal ideas and can answer your questions about meal planning. This health professional can also help you reach a healthy weight if that is one of your goals. What plan is right for you? Your dietitian or diabetes educator may suggest that you start with the plate format or carbohydrate counting. The plate format  The plate format is a simple way to help you manage how you eat. You plan meals by learning how much space each food should take on a plate. Using the plate format helps you spread carbohydrate throughout the day. It can make it easier to keep your blood sugar level within your target range. It also helps you see if you're eating healthy portion sizes. To use the plate format, you put non-starchy vegetables on half your plate. Add meat or meat substitutes on one-quarter of the plate. Put a grain or starchy vegetable (such as brown rice or a potato) on the final quarter of the plate. You can add a small piece of fruit and some low-fat or fat-free milk or yogurt, depending on your carbohydrate goal for each meal.  Here are some tips for using the plate format:  · Make sure that you are not using an oversized plate. A 9-inch plate is best. Many restaurants use larger plates. · Get used to using the plate format at home. Then you can use it when you eat out. · Write down your questions about using the plate format. Talk to your doctor, a dietitian, or a diabetes educator about your concerns.   Carbohydrate counting With carbohydrate counting, you plan meals based on the amount of carbohydrate in each food. Carbohydrate raises blood sugar higher and more quickly than any other nutrient. It is found in desserts, breads and cereals, and fruit. It's also found in starchy vegetables such as potatoes and corn, grains such as rice and pasta, and milk and yogurt. Spreading carbohydrate throughout the day helps keep your blood sugar levels within your target range. Your daily amount depends on several things, including your weight, how active you are, which diabetes medicines you take, and what your goals are for your blood sugar levels. A registered dietitian or diabetes educator can help you plan how much carbohydrate to include in each meal and snack. A guideline for your daily amount of carbohydrate is:  · 45 to 60 grams at each meal. That's about the same as 3 to 4 carbohydrate servings. · 15 to 20 grams at each snack. That's about the same as 1 carbohydrate serving. The Nutrition Facts label on packaged foods tells you how much carbohydrate is in a serving of the food. First, look at the serving size on the food label. Is that the amount you eat in a serving? All of the nutrition information on a food label is based on that serving size. So if you eat more or less than that, you'll need to adjust the other numbers. Total carbohydrate is the next thing you need to look for on the label. If you count carbohydrate servings, one serving of carbohydrate is 15 grams. For foods that don't come with labels, such as fresh fruits and vegetables, you'll need a guide that lists carbohydrate in these foods. Ask your doctor, dietitian, or diabetes educator about books or other nutrition guides you can use. If you take insulin, you need to know how many grams of carbohydrate are in a meal. This lets you know how much rapid-acting insulin to take before you eat. If you use an insulin pump, you get a constant rate of insulin during the day. So the pump must be programmed at meals to give you extra insulin to cover the rise in blood sugar after meals. When you know how much carbohydrate you will eat, you can take the right amount of insulin. Or, if you always use the same amount of insulin, you need to make sure that you eat the same amount of carbohydrate at meals. If you need more help to understand carbohydrate counting and food labels, ask your doctor, dietitian, or diabetes educator. How do you get started with meal planning? Here are some tips to get started:  · Plan your meals a week at a time. Don't forget to include snacks too. · Use cookbooks or online recipes to plan several main meals. Plan some quick meals for busy nights. You also can double some recipes that freeze well. Then you can save half for other busy nights when you don't have time to cook. · Make sure you have the ingredients you need for your recipes. If you're running low on basic items, put these items on your shopping list too. · List foods that you use to make breakfasts, lunches, and snacks. List plenty of fruits and vegetables. · Post this list on the refrigerator. Add to it as you think of more things you need. · Take the list to the store to do your weekly shopping. Follow-up care is a key part of your treatment and safety. Be sure to make and go to all appointments, and call your doctor if you are having problems. It's also a good idea to know your test results and keep a list of the medicines you take. Where can you learn more? Go to https://serjio.[email or web address] org and sign in to your [facility] account. Enter F895 in the [facility] box to learn more about \"Learning About Meal Planning for Diabetes. \" If you do not have an account, please click on the \"Sign Up Now\" link. Current as of: December 20, 2019               Content Version: 12.6  © 2006-2020 [employer]. Care instructions adapted under license by [facility] ([facility]). If you have questions about a medical condition or this instruction, always ask your healthcare professional. [address] any warranty or liability for your use of this information. Patient Education        Learning About Physical Activity  What is physical activity? Physical activity is any kind of activity that gets your body moving. The types of physical activity that can help you get fit and stay healthy include:  · Aerobic or \"cardio\" activities that make your heart beat faster and make you breathe harder, such as brisk walking, riding a bike, or running. Aerobic activities strengthen your heart and lungs and build up your endurance. · Strength training activities that make your muscles work against, or \"resist,\" something, such as lifting weights or doing push-ups. These activities help tone and strengthen your muscles. · Stretches that allow you to move your joints and muscles through their full range of motion. Stretching helps you be more flexible and avoid injury. What are the benefits of physical activity? Being active is one of the best things you can do for your health. It helps you to:  · Feel stronger and have more energy to do all the things you like to do. · Focus better at school or work. · Feel, think, and sleep better. · Reach and stay at a healthy weight. · Lose fat and build lean muscle. · Lower your risk for serious health problems. · Keep your bones, muscles, and joints strong. How can you make physical activity part of your life? Get at least 30 minutes of exercise on most days of the week. Walking is a good choice. You also may want to do other activities, such as running, swimming, cycling, or playing tennis or team sports. Pick activities that you likeones that make your heart beat faster, your muscles stronger, and your muscles and joints more flexible. If you find more than one thing you like doing, do them all. You don't have to do the same thing every day. Get your heart pumping every day. Any activity that makes your heart beat faster and keeps it at that rate for a while counts. Here are some great ways to get your heart beating faster:  · Go for a brisk walk, run, or bike ride. · Go for a hike or swim. · Go in-line skating. · Play a game of touch football, basketball, or soccer. · Ride a bike. · Play tennis or racquetball. · Climb stairs. Even some household chores can be aerobicjust do them at a faster pace. Vacuuming, raking or mowing the lawn, sweeping the garage, and washing and waxing the car all can help get your heart rate up. Strengthen your muscles during the week. You don't have to lift heavy weights or grow big, bulky muscles to get stronger. Doing a few simple activities that make your muscles work against, or \"resist,\" something can help you get stronger. For example, you can:  · Do push-ups or sit-ups, which use your own body weight as resistance. · Lift weights or dumbbells or use stretch bands at home or in a gym or community center. Stretch your muscles often. Stretching will help you as you become more active. It can help you stay flexible, loosen tight muscles, and avoid injury. It can also help improve your balance and posture and can be a great way to relax. Be sure to stretch the muscles you'll be using when you work out. It's best to warm your muscles slightly before you stretch them. Walk or do some other light aerobic activity for a few minutes, and then start stretching. When you stretch your muscles:  · Do it slowly. Stretching is not about going fast or making sudden movements. · Don't push or bounce during a stretch. · Hold each stretch for at least 15 to 30 seconds, if you can. You should feel a stretch in the muscle, but not pain. · Breathe out as you do the stretch. Then breathe in as you hold the stretch. Don't hold your breath. If you're worried about how more activity might affect your health, have a checkup before you start. Follow any special advice your doctor gives you for getting a smart start. Where can you learn more? Go to https://Mersimopepiceweb.Bayou[employer]. org and sign in to your [employer] account. Enter G931 in the [employer] box to learn more about \"Learning About Physical Activity. \"     If you do not have an account, please click on the \"Sign Up Now\" link. Current as of: January 16, 2020               Content Version: 12.6  © 2006-2020 [employer]. Care instructions adapted under license by [facility] ([facility]). If you have questions about a medical condition or this instruction, always ask your healthcare professional. [address] any warranty or liability for your use of this information. Patient Education        Learning About Diabetes and Exercise  Can you exercise if you have diabetes? When you have diabetes, it's important to get regular exercise. This helps control your blood sugar level. You can still play sports, run, ride a bike, go swimming, and do other activities when you have diabetes. How can exercise help you manage diabetes? Your body turns the food you eat into glucose, a type of sugar. You need this sugar for energy. When you have diabetes, the sugar builds up in your blood. But when you exercise, your body uses sugar. This helps keep it from building up in your blood and results in lower blood sugar and better control of diabetes. Care instructions adapted under license by [facility] ([facility]). If you have questions about a medical condition or this instruction, always ask your healthcare professional. [address] any warranty or liability for your use of this information. Patient Education        empagliflozin  Pronunciation:  EM pa gli FLOE zin  Brand:  Lui  What is the most important information I should know about empagliflozin? You should not use empagliflozin if you have severe kidney disease, or if you are on dialysis. Taking empagliflozin can make you dehydrated, which could cause you to feel weak or dizzy (especially when you stand up). Empagliflozin can cause serious infections in the penis or vagina. Get medical help right away if you have burning, itching, odor, discharge, pain, tenderness, redness or swelling of the genital or rectal area, fever, or if you don't feel well. What is empagliflozin? Empagliflozin is used together with diet and exercise to improve blood sugar control in adults with type 2 diabetes mellitus. Empagliflozin is also used to lower the risk of death from heart attack, stroke, or heart failure in adults with type 2 diabetes who also have heart disease. Empagliflozin is not for treating type 1 diabetes. Empagliflozin may also be used for purposes not listed in this medication guide. What should I discuss with my healthcare provider before taking empagliflozin? You should not use empagliflozin if you are allergic to it, or if you have:  · severe kidney disease (or if you are on dialysis). Tell your doctor if you have ever had:  · liver or kidney disease;  · bladder infections or other urination problems;  · low blood pressure;  · heart problems;  · problems with your pancreas, including surgery;  · if you drink alcohol often; or  · if you are on a low salt diet. Follow your doctor's instructions about using this medicine if you are pregnant or you become pregnant. Controlling diabetes is very important during pregnancy, and having high blood sugar may cause complications in both the mother and the baby. You should not use empagliflozin during the second or third trimester of pregnancy. You should not breastfeed while using this medicine. Empagliflozin is not approved for use by anyone younger than 25years old. How should I take empagliflozin? Follow all directions on your prescription label and read all medication guides or instruction sheets. Your doctor may occasionally change your dose. Use the medicine exactly as directed. You may take empagliflozin with or without food. Call your doctor if you are sick with vomiting or diarrhea, if you consume less food or fluid than usual, or if you are sweating more than usual.  Your blood sugar will need to be checked often, and you may also need to test the level of ketones your urine. Empagliflozin can cause life-threatening ketoacidosis (too much acid in the blood). Even if your blood sugar is normal, contact your doctor if a urine test shows that you have ketones in the urine. You may have low blood sugar (hypoglycemia) and feel very hungry, dizzy, irritable, confused, anxious, or shaky. To quickly treat hypoglycemia, eat or drink a fast-acting source of sugar (fruit juice, hard candy, crackers, raisins, or non-diet soda). Your doctor may prescribe a glucagon injection kit in case you have severe hypoglycemia. Be sure your family or close friends know how to give you this injection in an emergency. Also watch for signs of high blood sugar (hyperglycemia) such as increased thirst or urination. Blood sugar levels can be affected by stress, illness, surgery, exercise, alcohol use, or skipping meals. Ask your doctor before changing your dose or medication schedule. This medicine can affect the results of certain medical tests. Tell any doctor who treats you that you are using empagliflozin. Empagliflozin is only part of a treatment program that may also include diet, exercise, weight control, blood sugar testing, and special medical care. Follow your doctor's instructions very closely. Store at room temperature away from moisture and heat. What happens if I miss a dose? Take the medicine as soon as you can, but skip the missed dose if it is almost time for your next dose. Do not take two doses at one time. What happens if I overdose? Seek emergency medical attention or call the Poison Help line at 1-281.913.4509. What should I avoid while taking empagliflozin? Avoid getting up too fast from a sitting or lying position, or you may feel dizzy. What are the possible side effects of empagliflozin? Get emergency medical help if you have signs of an allergic reaction: hives; difficult breathing; swelling of your face, lips, tongue, or throat. Seek medical attention right away if you have signs of a genital infection (penis or vagina): burning, itching, odor, discharge, pain, tenderness, redness or swelling of the genital or rectal area, fever, not feeling well. These symptoms may get worse quickly. Call your doctor at once if you have:  · little or no urination;  · dehydration symptoms --dizziness, weakness, feeling light-headed (like you might pass out);  · ketoacidosis (too much acid in the blood) --nausea, vomiting, stomach pain, confusion, unusual drowsiness, or trouble breathing; or  · signs of a bladder infection --pain or burning when you urinate, increased urination, blood in your urine, fever, pain in your pelvis or back. Older adults may be more likely to have side effects from this medicine. Common side effects may include:  · bladder infection; or  · vaginal yeast infection. This is not a complete list of side effects and others may occur. Call your doctor for medical advice about side effects. You may report side effects to FDA at 5-990-ASW-5420. What other drugs will affect empagliflozin? Tell your doctor about all your other medicines, especially:  · insulin or other oral diabetes medications; or  · a diuretic or \"water pill. \"  This list is not complete. Other drugs may affect empagliflozin, including prescription and over-the-counter medicines, vitamins, and herbal products. Not all possible drug interactions are listed here. Where can I get more information? Your pharmacist can provide more information about empagliflozin. Remember, keep this and all other medicines out of the reach of children, never share your medicines with others, and use this medication only for the indication prescribed. Every effort has been made to ensure that the information provided by [address] is accurate, up-to-date, and complete, but no guarantee is made to that effect. Drug information contained herein may be time sensitive. [facility] information has been compiled for use by healthcare practitioners and consumers in the [place] and therefore [facility] does not warrant that uses outside of the [place] are appropriate, unless specifically indicated otherwise. [facility]'s drug information does not endorse drugs, diagnose patients or recommend therapy. [facility]'s drug information is an informational resource designed to assist licensed healthcare practitioners in caring for their patients and/or to serve consumers viewing this service as a supplement to, and not a substitute for, the expertise, skill, knowledge and judgment of healthcare practitioners. The absence of a warning for a given drug or drug combination in no way should be construed to indicate that the drug or drug combination is safe, effective or appropriate for any given patient. [facility] does not assume any responsibility for any aspect of healthcare administered with the aid of information [facility] provides. The information contained herein is not intended to cover all possible uses, directions, precautions, warnings, drug interactions, allergic reactions, or adverse effects. If you have questions about the drugs you are taking, check with your doctor, nurse or pharmacist.  Copyright [id number] [address] [place]. Version: 3.02. Revision date: 3/31/2020. Care instructions adapted under license by [facility] ([facility]). If you have questions about a medical condition or this instruction, always ask your healthcare professional. [place] [address] any warranty or liability for your use of this information.        Patient Education        dulaglutide  Pronunciation:  DOO la GLOO tide  Brand:  Trulicity Pen What is the most important information I should know about dulaglutide? You should not use dulaglutide if you have Multiple Endocrine Neoplasia syndrome type 2 (MEN 2), or a personal or family history of medullary thyroid carcinoma (a type of thyroid cancer). Do not use dulaglutide if you are in a state of diabetic ketoacidosis (call your doctor for treatment). In animal studies, dulaglutide caused thyroid tumors or thyroid cancer. It is not known whether these effects would occur in people using regular doses. Ask your doctor about your risk. Call your doctor at once if you have signs of a thyroid tumor, such as swelling or a lump in your neck, trouble swallowing, a hoarse voice, or shortness of breath. What is dulaglutide? Dulaglutide is used together with diet and exercise to improve blood sugar control in adults with type 2 diabetes mellitus. Dulaglutide is usually given after other diabetes medicines have been tried without success. This medicine is not for treating type 1 diabetes. Dulaglutide may also be used for purposes not listed in this medication guide. What should I discuss with my healthcare provider before using dulaglutide? You should not use dulaglutide if you are allergic to it, or if you have:  · multiple endocrine neoplasia type 2 (tumors in your glands);  · a personal or family history of medullary thyroid carcinoma (a type of thyroid cancer); or  · diabetic ketoacidosis (call your doctor for treatment). Tell your doctor if you have ever had:  · pancreatitis;  · a stomach or intestinal disorder;  · gastroesophageal reflux disease (GERD) or slow digestion;  · liver or kidney disease;  · if you also use insulin or oral diabetes medicine; or  · if you have been sick with vomiting or diarrhea. In animal studies, dulaglutide caused thyroid tumors or thyroid cancer. It is not known whether these effects would occur in people using regular doses. Ask your doctor about your risk. Each injection pen or prefilled syringe is for one use only. Throw away after one use, even if there is still medicine left inside. Use a puncture-proof \"sharps\" container. Follow state or local laws about how to dispose of this container. Keep it out of the reach of children and pets. Store dulaglutide in the refrigerator, protected from light. Do not use past the expiration date on the medicine label. Do not freeze dulaglutide, and throw away the medicine if it has become frozen. You may also store dulaglutide at room temperature for up to 14 days before use. What happens if I miss a dose? Use the medicine as soon as you can, but skip the missed dose if your next dose is due in less than 3 days. Do not use two doses at one time. Do not use this medicine twice within a 72-hour period. What happens if I overdose? Seek emergency medical attention or call the Poison Help line at 1-345.264.3143. What should I avoid while using dulaglutide? Never share an injection pen or prefilled syringe with another person, even if the needle has been changed. Sharing these devices can allow infections or disease to pass from one person to another. What are the possible side effects of dulaglutide? Stop using dulaglutide and get emergency medical help if you have signs of an allergic reaction: hives; difficult breathing; feeling light-headed; swelling of your face, lips, tongue, or throat. Call your doctor at once if you have:  · pancreatitis --severe pain in your upper stomach spreading to your back, nausea and vomiting;  · signs of a thyroid tumor --swelling or a lump in your neck, trouble swallowing, a hoarse voice, or if you feel short of breath;  · low blood sugar --headache, hunger, weakness, sweating, confusion, irritability, dizziness, fast heart rate, or feeling jittery; or  · kidney problems --little or no urination, swelling in your feet or ankles, feeling tired or short of breath. Tell your doctor if you are sick with vomiting or diarrhea, or if you are sweating more than usual. You can easily become dehydrated while using dulaglutide. Common side effects may include:  · nausea, vomiting, stomach pain;  · diarrhea; or  · loss of appetite. This is not a complete list of side effects and others may occur. Call your doctor for medical advice about side effects. You may report side effects to FDA at 3-873-YUZ-0343. What other drugs will affect dulaglutide? Dulaglutide can slow your digestion, and it may take longer for your body to absorb any medicines you take by mouth. Other drugs may affect dulaglutide, including prescription and over-the-counter medicines, vitamins, and herbal products. Tell your doctor about all your current medicines and any medicine you start or stop using. Where can I get more information? Your pharmacist can provide more information about dulaglutide. Remember, keep this and all other medicines out of the reach of children, never share your medicines with others, and use this medication only for the indication prescribed. Every effort has been made to ensure that the information provided by [facility] is accurate, up-to-date, and complete, but no guarantee is made to that effect. Drug information contained herein may be time sensitive. [facility] information has been compiled for use by healthcare practitioners and consumers in the [place] and therefore [facility] does not warrant that uses outside of the [place] are appropriate, unless specifically indicated otherwise. [facility]'s drug information does not endorse drugs, diagnose patients or recommend therapy. [facility]'s drug information is an informational resource designed to assist licensed healthcare practitioners in caring for their patients and/or to serve consumers viewing this service as a supplement to, and not a substitute for, the expertise, skill, knowledge and judgment of healthcare practitioners. The absence of a warning for a given drug or drug combination in no way should be construed to indicate that the drug or drug combination is safe, effective or appropriate for any given patient. [facility] does not assume any responsibility for any aspect of healthcare administered with the aid of information [facility] provides. The information contained herein is not intended to cover all possible uses, directions, precautions, warnings, drug interactions, allergic reactions, or adverse effects. If you have questions about the drugs you are taking, check with your doctor, nurse or pharmacist.  Copyright 5846-7783 [address] [place] [address] Dr WATSON. Version: 3.01. Revision date: 5/14/2020. Care instructions adapted under license by [employer]. If you have questions about a medical condition or this instruction, always ask your healthcare professional. [place] [address] any warranty or liability for your use of this information.        Patient Education        metformin  Pronunciation:  met FOR min Brand:  Fortamet, Glucophage, Glucophage XR, Glumetza, MetFORMIN (Eqv-Fortamet), MetFORMIN (Eqv-Glucophage XR), MetFORMIN (Eqv-Glumetza), Riomet, Riomet ER  What is the most important information I should know about metformin? You should not use this medicine if you have severe kidney disease, metabolic acidosis, or diabetic ketoacidosis (call your doctor for treatment). If you need to have any type of x-ray or CT scan using a dye that is injected into your veins, you may need to temporarily stop taking metformin. You may develop lactic acidosis, a dangerous build-up of lactic acid in your blood. Call your doctor or get emergency medical help if you have unusual muscle pain, trouble breathing, stomach pain, dizziness, feeling cold, or feeling very weak or tired. What is metformin? Metformin is used together with diet and exercise to improve blood sugar control in adults with type 2 diabetes mellitus. Metformin is sometimes used together with insulin or other medications, but metformin is not for treating type 1 diabetes. Metformin may also be used for purposes not listed in this medication guide. What should I discuss with my healthcare provider before taking metformin? You should not use metformin if you are allergic to it, or if you have:  · severe kidney disease; or  · metabolic acidosis or diabetic ketoacidosis (call your doctor for treatment). If you need to have surgery or any type of x-ray or CT scan using a dye that is injected into your veins, you may need to temporarily stop taking metformin. Be sure your caregivers know ahead of time that you are using this medication. Tell your doctor if you have ever had:  · kidney disease (your kidney function may need to be checked before you take this medicine);  · high ketone levels in your blood or urine;  · heart disease, congestive heart failure;  · liver disease; or  · if you also use insulin, or other oral diabetes medications. You may have low blood sugar (hypoglycemia) and feel very hungry, dizzy, irritable, confused, anxious, or shaky. To quickly treat hypoglycemia, eat or drink a fast-acting source of sugar (fruit juice, hard candy, crackers, raisins, or non-diet soda). Your doctor may prescribe a glucagon injection kit in case you have severe hypoglycemia. Be sure your family or close friends know how to give you this injection in an emergency. Blood sugar levels can be affected by stress, illness, surgery, exercise, alcohol use, or skipping meals. Ask your doctor before changing your dose or medication schedule. Metformin is only part of a complete treatment program that may also include diet, exercise, weight control, blood sugar testing, and special medical care. Follow your doctor's instructions very closely. Store at room temperature away from moisture, heat, and light. Your doctor may have you take extra vitamin B12 while you are taking metformin. Take only the amount of vitamin B12 that your doctor has prescribed. What happens if I miss a dose? Take the medicine as soon as you can, but skip the missed dose if it is almost time for your next dose. Do not take two doses at one time. What happens if I overdose? Seek emergency medical attention or call the Poison Help line at 1-555.255.1438. An overdose can cause severe hypoglycemia or lactic acidosis. What should I avoid while taking metformin? Avoid drinking alcohol. It lowers blood sugar and may increase your risk of lactic acidosis. What are the possible side effects of metformin? Get emergency medical help if you have signs of an allergic reaction:  hives; difficult breathing; swelling of your face, lips, tongue, or throat.   Some people using metformin develop lactic acidosis, which can be fatal. Get emergency medical help if you have even mild symptoms such as:  · unusual muscle pain;  · feeling cold;  · trouble breathing; · feeling dizzy, light-headed, tired, or very weak;  · stomach pain, vomiting; or  · slow or irregular heart rate. Common side effects may include:  · low blood sugar;  · nausea, upset stomach; or  · diarrhea. This is not a complete list of side effects and others may occur. Call your doctor for medical advice about side effects. You may report side effects to FDA at 8-929-DXY-2648. What other drugs will affect metformin? Many drugs can affect metformin, making this medicine less effective or increasing your risk of lactic acidosis. This includes prescription and over-the-counter medicines, vitamins, and herbal products. Not all possible interactions are listed here. Tell your doctor about all your current medicines and any medicine you start or stop using. Where can I get more information? Your pharmacist can provide more information about metformin. Remember, keep this and all other medicines out of the reach of children, never share your medicines with others, and use this medication only for the indication prescribed. Every effort has been made to ensure that the information provided by [facility] is accurate, up-to-date, and complete, but no guarantee is made to that effect. Drug information contained herein may be time sensitive. [facility] information has been compiled for use by healthcare practitioners and consumers in the [place] and therefore [facility] does not warrant that uses outside of the [place] are appropriate, unless specifically indicated otherwise. [facility]'s drug information does not endorse drugs, diagnose patients or recommend therapy. [facility]'s drug information is an informational resource designed to assist licensed healthcare practitioners in caring for their patients and/or to serve consumers viewing this service as a supplement to, and not a substitute for, the expertise, skill, knowledge and judgment of healthcare practitioners. The absence of a warning for a given drug or drug combination in no way should be construed to indicate that the drug or drug combination is safe, effective or appropriate for any given patient. [facility] does not assume any responsibility for any aspect of healthcare administered with the aid of information [facility] provides. The information contained herein is not intended to cover all possible uses, directions, precautions, warnings, drug interactions, allergic reactions, or adverse effects. If you have questions about the drugs you are taking, check with your doctor, nurse or pharmacist.  Copyright [id number] [address] [place] [address] Dr WATSON. Version: 17.02. Revision date: 4/9/2020. Care instructions adapted under license by [facility] ([facility]). If you have questions about a medical condition or this instruction, always ask your healthcare professional. [place] [address] any warranty or liability for your use of this information.

## 2021-01-14 NOTE — PROGRESS NOTES
[address] [facility], [address]  Phone [phone]   Fax [phone]      OFFICE VISIT: 2021    Florencia KARIMI Leroy-: 1975      HPI  Reason For Visit:  Florencia Boyce is a 50 y.o. Diabetes, Hypertension, and Hyperlipidemia    Patient presents to discuss laboratory findings. You have multiple questions about diabetes. We discussed the symptoms of diabetes. Discussed the complications of diabetes. We discussed the treatment of diabetes as well as ADA guidelines and the pros and cons of each class of medications. We discussed monitoring of diabetes  We discussed diabetes control. We discussed diet in great detail. We also discussed exercise and different types of exercise. I also answered any questions that he had in regards to individual medications and diabetes in general.  Ultimately we did we decided on a regimen of    Metformin 500 mg with his largest meal of the day. Trulicity 7.08 mg once weekly injected   Jardiance 25 mg daily. This will hopefully get his blood sugars down into the normal range and hemoglobin A1c in the 7 range especially if he is willing to combine this with diet and exercise. vitals were not taken for this visit. There is no height or weight on file to calculate BMI.     I have reviewed the following with the Mr. [facility]   Lab Review  Orders Only on 2021   Component Date Value    Hep A IgM 2021 Non-Reactive     Hep B Core Ab, IgM 2021 Non-Reactive     Hep B S Ag Interp 2021 Non-Reactive     Hep C Ab Interp 2021 Non-Reactive    Orders Only on 2021   Component Date Value    Microalbumin, Random Uri* 2021 4.10     Creatinine, Ur 2021 174.0     Microalbumin Creatinine * 2021 23.6     TSH 2021 1.800     T4 Free 2021 1.34     Cholesterol, Total 2021 151*    Triglycerides 2021 170*    HDL 2021 36*    LDL Calculated 2021 81     Hemoglobin A1C triamcinolone (KENALOG) 0.1 % cream Apply topically 2 times daily. (Patient taking differently: Apply topically 2 times daily as needed Apply topically 2 times daily.) 80 g 5     No current facility-administered medications for this visit. Allergies: Patient has no known allergies. Past Medical History:   Diagnosis Date    ED (erectile dysfunction)     Fatigue     Hyperlipidemia     Hypertension     Obesity     Osteoarthrosis     Snoring        No family history on file. Past Surgical History:   Procedure Laterality Date    ARM SURGERY Left     broken     KNEE SURGERY      Scope     VASECTOMY         Social History     Tobacco Use    Smoking status: Former Smoker     Packs/day: 0.50     Years: 10.00     Pack years: 5.00     Quit date: 2003     Years since quittin.1    Smokeless tobacco: Former User   Substance Use Topics    Alcohol use: No        Review of Systems   Constitutional: Positive for activity change ( He is trying to exercise more but limited by his knees). Negative for chills, fatigue and fever. HENT: Negative for congestion, ear pain and sore throat. Eyes: Negative for visual disturbance. Respiratory: Negative for cough, chest tightness, shortness of breath and wheezing. Uses CPAP regularly   Cardiovascular: Negative for chest pain, palpitations and leg swelling. Gastrointestinal: Negative for abdominal pain, diarrhea, nausea and vomiting. Endocrine: Negative for polyuria. Genitourinary: Negative for frequency and urgency. Musculoskeletal: Positive for arthralgias (knees are better after shots. ). Negative for back pain and neck pain. Skin: Negative for rash. Neurological: Negative for dizziness and headaches. Psychiatric/Behavioral: Negative for self-injury. The patient is not nervous/anxious. Physical Exam  Physical exam was not performed as this was a video teleconference visit using [address] [place] [address] Road    1. Type 2 diabetes mellitus without complication, without long-term current use of insulin (HCC)  E11.9    2. BMI 45.0-49.9, adult ([facility]  Z68.42          PLAN    1. Type 2 diabetes mellitus without complication, without long-term current use of insulin ([facility]  An extensive discussion of diabetes and treatment were performed during this evaluation. See HPI above    2. BMI 45.0-49.9, adult [facility])  We have chosen the medication regimen that will also result in significant weight loss. This should also help him from his diabetes standpoint in the long run and also his overall health. No orders of the defined types were placed in this encounter. Return in about 3 months (around 4/13/2021), or if symptoms worsen or fail to improve, for As scheduled. I spent in excess of 40 min with him discussing medications and diabetes.

## 2021-02-08 ENCOUNTER — PATIENT MESSAGE (OUTPATIENT)
Dept: PRIMARY CARE CLINIC | Age: 46
End: 2021-02-08

## 2021-02-08 DIAGNOSIS — E11.9 TYPE 2 DIABETES MELLITUS WITHOUT COMPLICATION, WITHOUT LONG-TERM CURRENT USE OF INSULIN (HCC): Primary | ICD-10-CM

## 2021-02-08 NOTE — TELEPHONE ENCOUNTER
From: Cm Barry  To: Irasema Baez DO  Sent: 2/8/2021 8:31 AM CST  Subject: Prescription Question    I will be out of my samples of Jardiance and Trulicity tomorrow. What do I need to do?

## 2021-02-10 NOTE — TELEPHONE ENCOUNTER
We can do prescriptions for Trulicity and Jardiance as noted. Can also put in a prescription for glucometer and test strips.   Glucose daily and as needed  Porter #[id number] with 3 refills on test strips

## 2021-02-11 NOTE — TELEPHONE ENCOUNTER
Message returned to pt letting him know that rx will be sent to Dr Julissa Schneider to authorization. Requested Prescriptions     Pending Prescriptions Disp Refills    Dulaglutide 0.75 MG/0.5ML SOPN 12 pen 3     Sig: Inject 0.75 mg into the skin once a week    empagliflozin (JARDIANCE) 25 MG tablet 90 tablet 3     Sig: Take 1 tablet by mouth daily    blood glucose monitor kit and supplies 1 kit 0     Sig: Dispense sufficient amount for indicated testing frequency plus additional to accommodate PRN testing needs. Dispense all needed supplies to include: monitor, strips, lancing device, lancets, control solutions, alcohol swabs.  blood glucose monitor strips 100 strip 3     Sig: Test once a day & as needed for symptoms of irregular blood glucose.

## 2021-02-12 RX ORDER — GLUCOSAMINE HCL/CHONDROITIN SU 500-400 MG
CAPSULE ORAL
Qty: 100 STRIP | Refills: 3 | Status: SHIPPED | OUTPATIENT
Start: 2021-02-12

## 2021-02-12 RX ORDER — EMPAGLIFLOZIN 25 MG/1
1 TABLET, FILM COATED ORAL DAILY
Qty: 90 TABLET | Refills: 3 | Status: SHIPPED | OUTPATIENT
Start: 2021-02-12 | End: 2022-03-11 | Stop reason: SDUPTHER

## 2021-02-15 NOTE — TELEPHONE ENCOUNTER
Please make sure that he has coupon cards as he does have a commercial insurance and he should not have to pay that much for this medication.   It should be relatively inexpensive with a coupon cards

## 2021-02-15 NOTE — TELEPHONE ENCOUNTER
Olvin Boyle DO 11 minutes ago (1:37 PM)        The Jardiance cost at the pharmacy was $375.00 after insurance. I did not get it. The Trulicity was not in stock but I presume it will cost hundreds of dollars after insurance. I will not pay that much for medicine.

## 2021-04-06 ENCOUNTER — OFFICE VISIT (OUTPATIENT)
Dept: PRIMARY CARE CLINIC | Age: 46
End: 2021-04-06
Payer: COMMERCIAL

## 2021-04-06 VITALS
SYSTOLIC BLOOD PRESSURE: 128 MMHG | OXYGEN SATURATION: 97 % | DIASTOLIC BLOOD PRESSURE: 86 MMHG | BODY MASS INDEX: 41.08 KG/M2 | HEART RATE: 87 BPM | WEIGHT: 310 LBS | HEIGHT: 73 IN | TEMPERATURE: 97.5 F

## 2021-04-06 DIAGNOSIS — G89.29 CHRONIC PAIN OF RIGHT KNEE: ICD-10-CM

## 2021-04-06 DIAGNOSIS — R53.83 FATIGUE, UNSPECIFIED TYPE: ICD-10-CM

## 2021-04-06 DIAGNOSIS — M25.561 CHRONIC PAIN OF RIGHT KNEE: ICD-10-CM

## 2021-04-06 DIAGNOSIS — E11.9 TYPE 2 DIABETES MELLITUS WITHOUT COMPLICATION, WITHOUT LONG-TERM CURRENT USE OF INSULIN (HCC): ICD-10-CM

## 2021-04-06 DIAGNOSIS — I10 ESSENTIAL HYPERTENSION: ICD-10-CM

## 2021-04-06 DIAGNOSIS — E78.2 MIXED HYPERLIPIDEMIA: ICD-10-CM

## 2021-04-06 DIAGNOSIS — M17.12 PRIMARY OSTEOARTHRITIS OF LEFT KNEE: Primary | ICD-10-CM

## 2021-04-06 DIAGNOSIS — R73.02 IMPAIRED GLUCOSE TOLERANCE: ICD-10-CM

## 2021-04-06 LAB
ALBUMIN SERPL-MCNC: 4.3 G/DL (ref 3.5–5.2)
ALP BLD-CCNC: 156 U/L (ref 40–130)
ALT SERPL-CCNC: 23 U/L (ref 5–41)
ANION GAP SERPL CALCULATED.3IONS-SCNC: 17 MMOL/L (ref 7–19)
AST SERPL-CCNC: 38 U/L (ref 5–40)
BASOPHILS ABSOLUTE: 0.1 K/UL (ref 0–0.2)
BASOPHILS RELATIVE PERCENT: 0.8 % (ref 0–1)
BILIRUB SERPL-MCNC: 0.8 MG/DL (ref 0.2–1.2)
BUN BLDV-MCNC: 11 MG/DL (ref 6–20)
CALCIUM SERPL-MCNC: 9.8 MG/DL (ref 8.6–10)
CHLORIDE BLD-SCNC: 100 MMOL/L (ref 98–111)
CHOLESTEROL, TOTAL: 140 MG/DL (ref 160–199)
CO2: 22 MMOL/L (ref 22–29)
CREAT SERPL-MCNC: 0.7 MG/DL (ref 0.5–1.2)
CREATININE URINE: 108.7 MG/DL (ref 4.2–622)
EOSINOPHILS ABSOLUTE: 0.3 K/UL (ref 0–0.6)
EOSINOPHILS RELATIVE PERCENT: 2.8 % (ref 0–5)
GFR AFRICAN AMERICAN: >59
GFR NON-AFRICAN AMERICAN: >60
GLUCOSE BLD-MCNC: 119 MG/DL (ref 74–109)
HBA1C MFR BLD: 6.5 % (ref 4–6)
HCT VFR BLD CALC: 48.2 % (ref 42–52)
HDLC SERPL-MCNC: 31 MG/DL (ref 55–121)
HEMOGLOBIN: 15.6 G/DL (ref 14–18)
IMMATURE GRANULOCYTES #: 0.1 K/UL
LDL CHOLESTEROL CALCULATED: 73 MG/DL
LYMPHOCYTES ABSOLUTE: 1.9 K/UL (ref 1.1–4.5)
LYMPHOCYTES RELATIVE PERCENT: 18 % (ref 20–40)
MCH RBC QN AUTO: 30.6 PG (ref 27–31)
MCHC RBC AUTO-ENTMCNC: 32.4 G/DL (ref 33–37)
MCV RBC AUTO: 94.7 FL (ref 80–94)
MICROALBUMIN UR-MCNC: <1.2 MG/DL (ref 0–19)
MICROALBUMIN/CREAT UR-RTO: NORMAL MG/G
MONOCYTES ABSOLUTE: 0.9 K/UL (ref 0–0.9)
MONOCYTES RELATIVE PERCENT: 8.8 % (ref 0–10)
NEUTROPHILS ABSOLUTE: 7.2 K/UL (ref 1.5–7.5)
NEUTROPHILS RELATIVE PERCENT: 68.7 % (ref 50–65)
PDW BLD-RTO: 13.6 % (ref 11.5–14.5)
PLATELET # BLD: 376 K/UL (ref 130–400)
PMV BLD AUTO: 9.9 FL (ref 9.4–12.4)
POTASSIUM SERPL-SCNC: 3.5 MMOL/L (ref 3.5–5)
RBC # BLD: 5.09 M/UL (ref 4.7–6.1)
SODIUM BLD-SCNC: 139 MMOL/L (ref 136–145)
T4 FREE: 1.34 NG/DL (ref 0.93–1.7)
TOTAL PROTEIN: 7.8 G/DL (ref 6.6–8.7)
TRIGL SERPL-MCNC: 181 MG/DL (ref 0–149)
TSH SERPL DL<=0.05 MIU/L-ACNC: 1.34 UIU/ML (ref 0.27–4.2)
WBC # BLD: 10.5 K/UL (ref 4.8–10.8)

## 2021-04-06 PROCEDURE — 99214 OFFICE O/P EST MOD 30 MIN: CPT | Performed by: PEDIATRICS

## 2021-04-06 PROCEDURE — 20610 DRAIN/INJ JOINT/BURSA W/O US: CPT | Performed by: PEDIATRICS

## 2021-04-06 RX ORDER — METHYLPREDNISOLONE ACETATE 80 MG/ML
80 INJECTION, SUSPENSION INTRA-ARTICULAR; INTRALESIONAL; INTRAMUSCULAR; SOFT TISSUE ONCE
Status: COMPLETED | OUTPATIENT
Start: 2021-04-06 | End: 2021-04-06

## 2021-04-06 RX ORDER — TRIAMCINOLONE ACETONIDE 40 MG/ML
40 INJECTION, SUSPENSION INTRA-ARTICULAR; INTRAMUSCULAR ONCE
Status: COMPLETED | OUTPATIENT
Start: 2021-04-06 | End: 2021-04-06

## 2021-04-06 RX ADMIN — TRIAMCINOLONE ACETONIDE 40 MG: 40 INJECTION, SUSPENSION INTRA-ARTICULAR; INTRAMUSCULAR at 09:30

## 2021-04-06 RX ADMIN — METHYLPREDNISOLONE ACETATE 80 MG: 80 INJECTION, SUSPENSION INTRA-ARTICULAR; INTRALESIONAL; INTRAMUSCULAR; SOFT TISSUE at 09:30

## 2021-04-06 ASSESSMENT — ENCOUNTER SYMPTOMS
CHEST TIGHTNESS: 0
ABDOMINAL PAIN: 0
DIARRHEA: 0
COUGH: 0
SORE THROAT: 0
WHEEZING: 0
BACK PAIN: 0
VOMITING: 0
SHORTNESS OF BREATH: 0
NAUSEA: 0

## 2021-04-06 NOTE — PROGRESS NOTES
[address], [address]  Phone [phone]   Fax [phone]      OFFICE VISIT: 2021    Ileana Lopez SACHI Moranum-: 1975      HPI  Reason For Visit:  Ileana Lopez is a 50 y.o.     3 Month Follow-Up, Diabetes (BG has been running around 110-115. ), and Knee Pain (pain in both knees, would like injections today. )      Patient presents on follow-up for bilateral knee arthritis. He is wanting recurrent knee injections today. He has been getting knee injections approximately every 3 months with good results. Diabetes Mellitus Type 2    Diet compliance:  compliant most of the time  Nutrition Consultation Needed:  no  Medication:   Metformin 500 mg daily with largest meal of the day   Jardiance 25 mg daily   Trulicity 2.55 mg injection subcutaneous weekly  Medication compliance:  compliant most of the time  Weight trend: decreasing. He has lost 20 pounds in the past 3 months and 31 pounds in the past 5 months. Current exercise: yes - some  Checkin times daily  Home blood sugar records: fasting range: [id number]  Low BG:  no  Eye exam current (within one year): yes  Checking Feet regularly:  yes - no sores  ACE/ARB:  yes - lisinopril  Aspirin: No: but recommended  Tobacco history: He  reports that he quit smoking about 17 years ago. He has a 5.00 pack-year smoking history.  He has quit using smokeless tobacco.    Lab Results   Component Value Date    LABA1C 6.5 (H) 2021    LABA1C 9.2 (H) 2021     Lab Results   Component Value Date    LABMICR 4.10 2021    CREATININE 0.7 2021       Hypertension:   BP today was   BP Readings from Last 1 Encounters:   21 128/86      Recent BP readings:    BP Readings from Last 3 Encounters:   21 128/86   21 122/80   10/01/20 130/80     Medication   Lisinoprilhydrochlorothiazide 20-12.5 mg twice daily   Amlodipine 5 mg daily  Medication compliance:  compliant most of the time  Home blood pressure monitoring: >59     Calcium 01/05/2021 10.5*    Total Protein 01/05/2021 8.4     Albumin 01/05/2021 4.5     Total Bilirubin 01/05/2021 0.8     Alkaline Phosphatase 01/05/2021 177*    ALT 01/05/2021 49*    AST 01/05/2021 72*    WBC 01/05/2021 10.5     RBC 01/05/2021 5.23     Hemoglobin 01/05/2021 16.2     Hematocrit 01/05/2021 49.2     MCV 01/05/2021 94.1*    MCH 01/05/2021 31.0     MCHC 01/05/2021 32.9*    RDW 01/05/2021 14.1     Platelets 83/10/3640 379     MPV 01/05/2021 9.9     Neutrophils % 01/05/2021 65.3*    Lymphocytes % 01/05/2021 22.9     Monocytes % 01/05/2021 7.5     Eosinophils % 01/05/2021 2.5     Basophils % 01/05/2021 0.9     Neutrophils Absolute 01/05/2021 6.9     Immature Granulocytes # 01/05/2021 0.1     Lymphocytes Absolute 01/05/2021 2.4     Monocytes Absolute 01/05/2021 0.80     Eosinophils Absolute 01/05/2021 0.30     Basophils Absolute 01/05/2021 0.10      Copies of these are in the chart. Current Outpatient Medications   Medication Sig Dispense Refill    Dulaglutide 0.75 MG/0.5ML SOPN Inject 0.75 mg into the skin once a week 12 pen 3    empagliflozin (JARDIANCE) 25 MG tablet Take 1 tablet by mouth daily 90 tablet 3    blood glucose monitor kit and supplies Dispense sufficient amount for indicated testing frequency plus additional to accommodate PRN testing needs. Dispense all needed supplies to include: monitor, strips, lancing device, lancets, control solutions, alcohol swabs. 1 kit 0    blood glucose monitor strips Test once a day & as needed for symptoms of irregular blood glucose.  100 strip 3    metFORMIN (GLUCOPHAGE) 500 MG tablet Take 1 tablet by mouth Daily with supper 30 tablet 5    atorvastatin (LIPITOR) 40 MG tablet Take 1 tablet by mouth nightly 90 tablet 3    meloxicam (MOBIC) 15 MG tablet Take 1 tablet by mouth daily 90 tablet 3    lisinopril-hydroCHLOROthiazide (PRINZIDE;ZESTORETIC) 20-12.5 MG per tablet Take 1 tablet by mouth 2 times daily 180 tablet 3    amLODIPine (NORVASC) 5 MG tablet Take 1 tablet by mouth daily 90 tablet 3    sildenafil (VIAGRA) 100 MG tablet Take 1 tablet by mouth as needed for Erectile Dysfunction 30 tablet 3    CPAP Machine MISC by Does not apply route nightly      triamcinolone (KENALOG) 0.1 % cream Apply topically 2 times daily. (Patient taking differently: Apply topically 2 times daily as needed Apply topically 2 times daily.) 80 g 5     Current Facility-Administered Medications   Medication Dose Route Frequency Provider Last Rate Last Admin    triamcinolone acetonide (KENALOG-40) injection 40 mg  40 mg Intra-articular Once B Thomasena Mckeon, DO        methylPREDNISolone acetate (DEPO-MEDROL) injection 80 mg  80 mg Intra-articular Once B Thomasena Mckeon, DO        triamcinolone acetonide (KENALOG-40) injection 40 mg  40 mg Intra-articular Once B Thomasena Cmkeon, DO        methylPREDNISolone acetate (DEPO-MEDROL) injection 80 mg  80 mg Intra-articular Once B Thomasena Mckeon, DO           Allergies: Patient has no known allergies. Past Medical History:   Diagnosis Date    ED (erectile dysfunction)     Fatigue     Hyperlipidemia     Hypertension     Obesity     Osteoarthrosis     Snoring        No family history on file. Past Surgical History:   Procedure Laterality Date    ARM SURGERY Left     broken     KNEE SURGERY      Scope     VASECTOMY         Social History     Tobacco Use    Smoking status: Former Smoker     Packs/day: 0.50     Years: 10.00     Pack years: 5.00     Quit date: 2003     Years since quittin.3    Smokeless tobacco: Former User   Substance Use Topics    Alcohol use: No        Review of Systems   Constitutional: Positive for activity change ( He is trying to exercise more but limited by his knees). Negative for chills, fatigue and fever. HENT: Negative for congestion, ear pain and sore throat. Eyes: Negative for visual disturbance.    Respiratory: Negative for cough, chest tightness, shortness of breath and wheezing. Uses CPAP regularly   Cardiovascular: Negative for chest pain, palpitations and leg swelling. Gastrointestinal: Negative for abdominal pain, diarrhea, nausea and vomiting. Endocrine: Negative for polyuria. Genitourinary: Negative for frequency and urgency. Musculoskeletal: Positive for arthralgias (knees are better after shots. ). Negative for back pain and neck pain. Skin: Negative for rash. Neurological: Negative for dizziness and headaches. Psychiatric/Behavioral: Negative for self-injury. The patient is not nervous/anxious. Physical Exam  Constitutional:       General: He is not in acute distress. Appearance: Normal appearance. He is well-developed. He is obese. HENT:      Head: Normocephalic and atraumatic. Right Ear: Tympanic membrane, ear canal and external ear normal.      Left Ear: Tympanic membrane, ear canal and external ear normal.      Nose: Nose normal.      Mouth/Throat:      Mouth: Mucous membranes are moist.      Pharynx: Oropharynx is clear. Eyes:      General: No scleral icterus. Extraocular Movements: Extraocular movements intact. Conjunctiva/sclera: Conjunctivae normal.      Pupils: Pupils are equal, round, and reactive to light. Neck:      Musculoskeletal: Normal range of motion and neck supple. Thyroid: No thyromegaly. Vascular: No carotid bruit or JVD. Cardiovascular:      Rate and Rhythm: Normal rate and regular rhythm. No extrasystoles are present. Chest Wall: PMI is not displaced. Pulses: Normal pulses. Heart sounds: Normal heart sounds, S1 normal and S2 normal. No murmur. No friction rub. No gallop. Pulmonary:      Effort: Pulmonary effort is normal. No respiratory distress. Breath sounds: Normal breath sounds. No wheezing, rhonchi or rales. Abdominal:      General: Bowel sounds are normal.      Palpations: Abdomen is soft. Tenderness:  There is no abdominal tenderness. There is no guarding or rebound. Genitourinary:     Comments: Exam deferred  Musculoskeletal: Normal range of motion. General: No tenderness. Comments: Crepitus in bilateral knees. Some occasional popping with movement   Lymphadenopathy:      Cervical: No cervical adenopathy. Skin:     General: Skin is warm and dry. Capillary Refill: Capillary refill takes less than 2 seconds. Findings: No rash. Neurological:      General: No focal deficit present. Mental Status: He is alert and oriented to person, place, and time. Sensory: No sensory deficit (no numbness or tingling). Psychiatric:         Mood and Affect: Mood normal.         Behavior: Behavior normal.         ASSESSMENT      ICD-10-CM    1. Primary osteoarthritis of left knee  M17.12 triamcinolone acetonide (KENALOG-40) injection 40 mg     methylPREDNISolone acetate (DEPO-MEDROL) injection 80 mg     IL DRAIN/INJECT LARGE JOINT/BURSA   2. Chronic pain of right knee  M25.561 triamcinolone acetonide (KENALOG-40) injection 40 mg    G89.29 methylPREDNISolone acetate (DEPO-MEDROL) injection 80 mg     IL DRAIN/INJECT LARGE JOINT/BURSA   3. Type 2 diabetes mellitus without complication, without long-term current use of insulin (HCC)  E11.9 Comprehensive Metabolic Panel     Hemoglobin A1C     Microalbumin / Creatinine Urine Ratio   4. Essential hypertension  I10 CBC Auto Differential     Comprehensive Metabolic Panel     Microalbumin / Creatinine Urine Ratio   5. Mixed hyperlipidemia  E78.2 Lipid Panel   6. BMI 40.0-44.9, adult ([facility] [address]  Z68.41    7. Fatigue, unspecified type  R53.83 TSH without Reflex     T4, Free         PLAN    1. Primary osteoarthritis of left knee  Knee injection today  - triamcinolone acetonide (KENALOG-40) injection 40 mg  - methylPREDNISolone acetate (DEPO-MEDROL) injection 80 mg  - IL DRAIN/INJECT LARGE JOINT/BURSA    2.  Chronic pain of right knee  Knee injection today  - the superior aspect of the joint from a lateral approach. 2 ml 1% lidocaine and 2 ml of DepoMedrol (40mg/ml) and 1ml Kenalog (40mg/ml)  was then injected into the joint. The needle was removed and the area cleansed and dressed. Complications:  None; patient tolerated the procedure well. Knee Arthrocentesis with Injection Procedure Note    Pre-operative Diagnosis: right knee djd with pain    Post-operative Diagnosis: same    Indications: Symptom relief from osteoarthritis    Anesthesia: not required     Procedure Details     Verbal consent was obtained for the procedure. The joint was prepped with chlorhexadine. The area was sprayed with Gebauer's Ethyl Chloride as a topical anesthetic and then a 22 gauge needle was inserted into the superior aspect of the joint from a lateral approach. 2 ml 1% lidocaine and 2 ml of DepoMedrol (40mg/ml) and 1ml Kenalog (40mg/ml)  was then injected into the joint. The needle was removed and the area cleansed and dressed. Complications:  None; patient tolerated the procedure well.

## 2021-04-07 ENCOUNTER — TELEPHONE (OUTPATIENT)
Dept: PRIMARY CARE CLINIC | Age: 46
End: 2021-04-07

## 2021-05-21 DIAGNOSIS — E11.9 TYPE 2 DIABETES MELLITUS WITHOUT COMPLICATION, WITHOUT LONG-TERM CURRENT USE OF INSULIN (HCC): Primary | ICD-10-CM

## 2021-05-21 RX ORDER — LANCETS 33 GAUGE
EACH MISCELLANEOUS
Qty: 100 EACH | Refills: 0 | Status: SHIPPED | OUTPATIENT
Start: 2021-05-21

## 2021-05-21 NOTE — TELEPHONE ENCOUNTER
Received fax from pharmacy requesting refill on pts medication(s). Pt was last seen in office on 4/6/2021  and has a follow up scheduled for 7/8/2021. Will send request to  Dr. Nesbitt Stands  for patient. Requested Prescriptions     Pending Prescriptions Disp Refills    Lancets ([address], [address]) [address] [facility] [Pharmacy Med Name: Adebayo Lawson JIMENA [address] MIS] 109 each 0     Sig: USE TO TEST BLOOD SUGAR ONCE DAILY AND AS NEEDED.

## 2021-07-08 ENCOUNTER — OFFICE VISIT (OUTPATIENT)
Dept: PRIMARY CARE CLINIC | Age: 46
End: 2021-07-08
Payer: COMMERCIAL

## 2021-07-08 VITALS
WEIGHT: 301.5 LBS | OXYGEN SATURATION: 98 % | HEIGHT: 73 IN | TEMPERATURE: 99.2 F | BODY MASS INDEX: 39.96 KG/M2 | SYSTOLIC BLOOD PRESSURE: 110 MMHG | HEART RATE: 78 BPM | DIASTOLIC BLOOD PRESSURE: 62 MMHG

## 2021-07-08 DIAGNOSIS — I10 ESSENTIAL HYPERTENSION: ICD-10-CM

## 2021-07-08 DIAGNOSIS — E78.2 MIXED HYPERLIPIDEMIA: ICD-10-CM

## 2021-07-08 DIAGNOSIS — E11.9 TYPE 2 DIABETES MELLITUS WITHOUT COMPLICATION, WITHOUT LONG-TERM CURRENT USE OF INSULIN (HCC): ICD-10-CM

## 2021-07-08 DIAGNOSIS — R53.83 FATIGUE, UNSPECIFIED TYPE: ICD-10-CM

## 2021-07-08 DIAGNOSIS — Z11.4 SCREENING FOR HIV (HUMAN IMMUNODEFICIENCY VIRUS): Primary | ICD-10-CM

## 2021-07-08 DIAGNOSIS — M17.12 PRIMARY OSTEOARTHRITIS OF LEFT KNEE: ICD-10-CM

## 2021-07-08 DIAGNOSIS — Z11.4 SCREENING FOR HIV (HUMAN IMMUNODEFICIENCY VIRUS): ICD-10-CM

## 2021-07-08 DIAGNOSIS — M17.11 PRIMARY OSTEOARTHRITIS OF RIGHT KNEE: ICD-10-CM

## 2021-07-08 DIAGNOSIS — Z99.89 OSA ON CPAP: ICD-10-CM

## 2021-07-08 DIAGNOSIS — G47.33 OSA ON CPAP: ICD-10-CM

## 2021-07-08 DIAGNOSIS — L03.114 CELLULITIS OF LEFT ELBOW: ICD-10-CM

## 2021-07-08 LAB
ALBUMIN SERPL-MCNC: 4.4 G/DL (ref 3.5–5.2)
ALP BLD-CCNC: 154 U/L (ref 40–130)
ALT SERPL-CCNC: 14 U/L (ref 5–41)
ANION GAP SERPL CALCULATED.3IONS-SCNC: 14 MMOL/L (ref 7–19)
AST SERPL-CCNC: 18 U/L (ref 5–40)
BASOPHILS ABSOLUTE: 0.1 K/UL (ref 0–0.2)
BASOPHILS RELATIVE PERCENT: 0.8 % (ref 0–1)
BILIRUB SERPL-MCNC: 0.6 MG/DL (ref 0.2–1.2)
BUN BLDV-MCNC: 15 MG/DL (ref 6–20)
CALCIUM SERPL-MCNC: 9.8 MG/DL (ref 8.6–10)
CHLORIDE BLD-SCNC: 103 MMOL/L (ref 98–111)
CHOLESTEROL, TOTAL: 111 MG/DL (ref 160–199)
CO2: 22 MMOL/L (ref 22–29)
CREAT SERPL-MCNC: 0.8 MG/DL (ref 0.5–1.2)
CREATININE URINE: 89.6 MG/DL (ref 4.2–622)
EOSINOPHILS ABSOLUTE: 0.2 K/UL (ref 0–0.6)
EOSINOPHILS RELATIVE PERCENT: 2.4 % (ref 0–5)
GFR AFRICAN AMERICAN: >59
GFR NON-AFRICAN AMERICAN: >60
GLUCOSE BLD-MCNC: 98 MG/DL (ref 74–109)
HBA1C MFR BLD: 6 % (ref 4–6)
HCT VFR BLD CALC: 47.5 % (ref 42–52)
HDLC SERPL-MCNC: 35 MG/DL (ref 55–121)
HEMOGLOBIN: 15.7 G/DL (ref 14–18)
HIV-1 P24 AG: NORMAL
IMMATURE GRANULOCYTES #: 0.1 K/UL
LDL CHOLESTEROL CALCULATED: 51 MG/DL
LYMPHOCYTES ABSOLUTE: 1.9 K/UL (ref 1.1–4.5)
LYMPHOCYTES RELATIVE PERCENT: 19.3 % (ref 20–40)
MCH RBC QN AUTO: 30.6 PG (ref 27–31)
MCHC RBC AUTO-ENTMCNC: 33.1 G/DL (ref 33–37)
MCV RBC AUTO: 92.6 FL (ref 80–94)
MICROALBUMIN UR-MCNC: <1.2 MG/DL (ref 0–19)
MICROALBUMIN/CREAT UR-RTO: NORMAL MG/G
MONOCYTES ABSOLUTE: 0.9 K/UL (ref 0–0.9)
MONOCYTES RELATIVE PERCENT: 8.9 % (ref 0–10)
NEUTROPHILS ABSOLUTE: 6.7 K/UL (ref 1.5–7.5)
NEUTROPHILS RELATIVE PERCENT: 68.1 % (ref 50–65)
PDW BLD-RTO: 13.8 % (ref 11.5–14.5)
PLATELET # BLD: 359 K/UL (ref 130–400)
PMV BLD AUTO: 9.5 FL (ref 9.4–12.4)
POTASSIUM SERPL-SCNC: 3.8 MMOL/L (ref 3.5–5)
RAPID HIV 1&2: NORMAL
RBC # BLD: 5.13 M/UL (ref 4.7–6.1)
SODIUM BLD-SCNC: 139 MMOL/L (ref 136–145)
TOTAL PROTEIN: 7.6 G/DL (ref 6.6–8.7)
TRIGL SERPL-MCNC: 124 MG/DL (ref 0–149)
WBC # BLD: 9.8 K/UL (ref 4.8–10.8)

## 2021-07-08 PROCEDURE — 20610 DRAIN/INJ JOINT/BURSA W/O US: CPT | Performed by: PEDIATRICS

## 2021-07-08 PROCEDURE — 99214 OFFICE O/P EST MOD 30 MIN: CPT | Performed by: PEDIATRICS

## 2021-07-08 RX ORDER — LISINOPRIL AND HYDROCHLOROTHIAZIDE 25; 20 MG/1; MG/1
1 TABLET ORAL DAILY
Qty: 30 TABLET | Refills: 0 | Status: SHIPPED | OUTPATIENT
Start: 2021-07-08 | End: 2021-08-09 | Stop reason: SDUPTHER

## 2021-07-08 RX ORDER — METHYLPREDNISOLONE ACETATE 80 MG/ML
80 INJECTION, SUSPENSION INTRA-ARTICULAR; INTRALESIONAL; INTRAMUSCULAR; SOFT TISSUE ONCE
Status: COMPLETED | OUTPATIENT
Start: 2021-07-08 | End: 2021-07-08

## 2021-07-08 RX ORDER — CEPHALEXIN 500 MG/1
500 CAPSULE ORAL 3 TIMES DAILY
Qty: 30 CAPSULE | Refills: 0 | Status: SHIPPED | OUTPATIENT
Start: 2021-07-08 | End: 2021-07-18

## 2021-07-08 RX ORDER — TRIAMCINOLONE ACETONIDE 40 MG/ML
40 INJECTION, SUSPENSION INTRA-ARTICULAR; INTRAMUSCULAR ONCE
Status: COMPLETED | OUTPATIENT
Start: 2021-07-08 | End: 2021-07-08

## 2021-07-08 RX ADMIN — METHYLPREDNISOLONE ACETATE 80 MG: 80 INJECTION, SUSPENSION INTRA-ARTICULAR; INTRALESIONAL; INTRAMUSCULAR; SOFT TISSUE at 19:14

## 2021-07-08 RX ADMIN — TRIAMCINOLONE ACETONIDE 40 MG: 40 INJECTION, SUSPENSION INTRA-ARTICULAR; INTRAMUSCULAR at 19:16

## 2021-07-08 RX ADMIN — METHYLPREDNISOLONE ACETATE 80 MG: 80 INJECTION, SUSPENSION INTRA-ARTICULAR; INTRALESIONAL; INTRAMUSCULAR; SOFT TISSUE at 19:15

## 2021-07-08 ASSESSMENT — ENCOUNTER SYMPTOMS
CHEST TIGHTNESS: 0
BACK PAIN: 0
VOMITING: 0
NAUSEA: 0
SHORTNESS OF BREATH: 0
SORE THROAT: 0
WHEEZING: 0
COUGH: 0
DIARRHEA: 0
ABDOMINAL PAIN: 0

## 2021-07-08 NOTE — PROGRESS NOTES
[facility] [facility], [address]  Phone [phone]   Fax [phone]      OFFICE VISIT: 2021    Juan Franco R Leroy-: 1975      hospitals  Reason For Visit:  Juan Franco is a 48 y.o.     3 Month Follow-Up, Diabetes (pt reports been running good. ), Knee Pain (not so good. Bilateral knee pain. Injured the right knee about 5-6 weeks ago. ), Joint Swelling (left elbow swelling. No injury noted. Throbbing and hot to touch last night. This started on Monday. ), and Health Maintenance (Colonoscopy, Diabetic Eye Exam, Diabetic Foot Exam)    Patient presents on follow-up with multiple health issues. Present concerns:  He is having more problems with his knees, in particular his right knee. He injured this about a month ago. He has had more swelling and pain in his knee since that injury. He is taking his meloxicam on a routine basis but this does not seem to be helping much at all. Mechanism of the injury was banging knee on a trailer and twisting. Additional treatments: RICE      He is due for his knee injections for his osteoarthritis as of today anyway. He has redness and swelling of his Left elbow. This is very sore to touch. It is also warm to touch. No fever or chills. No known trauma or bug bite or interruption in the skin. Diabetes Mellitus Type 2  Diet compliance:  he is trying to be compliant  Nutrition Consultation Needed:  no  Medication:   Jardiance 25 mg daily   Dulaglutide 0.75 mg subcu weekly   Metformin 500 mg daily with meal  Medication compliance:  compliant most of the time  Weight trend: decreasing. Weight is down 8 pounds 8 ounces from 3 months ago  Current exercise: yes -but limited due to his arthritic knee pain  Checking: occasionally  Home blood sugar records: fasting range: 85-90  Low BG:  no  Eye exam current (within one year): yes  Checking Feet regularly:  yes - no sores  ACE/ARB:  yes - lisinopril  Aspirin: No, but recommended.    Tobacco history: He reports that he quit smoking about 17 years ago. He has a 5.00 pack-year smoking history. He has quit using smokeless tobacco.    Lab Results   Component Value Date    LABA1C 6.5 (H) 04/06/2021    LABA1C 9.2 (H) 01/05/2021     Lab Results   Component Value Date    LABMICR <1.20 04/06/2021    CREATININE 0.7 04/06/2021       Hypertension:   BP today was   BP Readings from Last 1 Encounters:   07/08/21 110/62      Recent BP readings:    BP Readings from Last 3 Encounters:   07/08/21 110/62   04/06/21 128/86   01/05/21 122/80     Medication   Lisinoprilhydrochlorothiazide 20-12.5 mg twice daily   Amlodipine 5 mg daily  Medication compliance:  compliant most of the time  Home blood pressure monitoring: No.    He is not adherent to a low sodium diet. Symptoms: none  Laboratory:  Lab Results   Component Value Date    BUN 11 04/06/2021    CREATININE 0.7 04/06/2021       Hyperlipidemia:   Medication:   atorvastatin (Lipitor)  Low Fat, Low Choleterol Diet:  yes - he tries  Myalgias or GI upset: no  The patient exercises intermittently. Laboratory:    Lab Results   Component Value Date    CHOL 140 (L) 04/06/2021    TRIG 181 (H) 04/06/2021    HDL 31 (L) 04/06/2021    LDLCALC 73 04/06/2021    LDLDIRECT 128 06/09/2015      Lab Results   Component Value Date    ALT 23 04/06/2021    AST 38 04/06/2021       Knee DJD:  Medications:   Meloxicam 15 mg daily  Symptoms: As above  He is wanting injections today. Obstructive sleep apnea  Medication:   CPAP machine  He is using this on a nightly basis. height is 6' 1\" (1.854 m) and weight is 301 lb 8 oz (136.8 kg) (abnormal). His temporal temperature is 99.2 °F (37.3 °C). His blood pressure is 110/62 and his pulse is 78. His oxygen saturation is 98%. Body mass index is 39.78 kg/m².     I have reviewed the following with the Mr. Frances Lanier   Lab Review  Orders Only on 04/06/2021   Component Date Value    T4 Free 04/06/2021 1.34     TSH 04/06/2021 1.340     Microalbumin, Random Uri* 04/06/2021 <1.20     Creatinine, Ur 04/06/2021 108.7     Microalbumin Creatinine * 04/06/2021 see below     Cholesterol, Total 04/06/2021 140*    Triglycerides 04/06/2021 181*    HDL 04/06/2021 31*    LDL Calculated 04/06/2021 73     Hemoglobin A1C 04/06/2021 6.5*    Sodium 04/06/2021 139     Potassium 04/06/2021 3.5     Chloride 04/06/2021 100     CO2 04/06/2021 22     Anion Gap 04/06/2021 17     Glucose 04/06/2021 119*    BUN 04/06/2021 11     CREATININE 04/06/2021 0.7     GFR Non- 04/06/2021 >60     GFR  04/06/2021 >59     Calcium 04/06/2021 9.8     Total Protein 04/06/2021 7.8     Albumin 04/06/2021 4.3     Total Bilirubin 04/06/2021 0.8     Alkaline Phosphatase 04/06/2021 156*    ALT 04/06/2021 23     AST 04/06/2021 38     WBC 04/06/2021 10.5     RBC 04/06/2021 5.09     Hemoglobin 04/06/2021 15.6     Hematocrit 04/06/2021 48.2     MCV 04/06/2021 94.7*    MCH 04/06/2021 30.6     MCHC 04/06/2021 32.4*    RDW 04/06/2021 13.6     Platelets 81/81/9304 376     MPV 04/06/2021 9.9     Neutrophils % 04/06/2021 68.7*    Lymphocytes % 04/06/2021 18.0*    Monocytes % 04/06/2021 8.8     Eosinophils % 04/06/2021 2.8     Basophils % 04/06/2021 0.8     Neutrophils Absolute 04/06/2021 7.2     Immature Granulocytes # 04/06/2021 0.1     Lymphocytes Absolute 04/06/2021 1.9     Monocytes Absolute 04/06/2021 0.90     Eosinophils Absolute 04/06/2021 0.30     Basophils Absolute 04/06/2021 0.10      Copies of these are in the chart.     Current Outpatient Medications   Medication Sig Dispense Refill    cephALEXin (KEFLEX) 500 MG capsule Take 1 capsule by mouth 3 times daily for 10 days 30 capsule 0    lisinopril-hydroCHLOROthiazide (PRINZIDE;ZESTORETIC) 20-25 MG per tablet Take 1 tablet by mouth daily 30 tablet 0    Lancets (ONETOUCH DELICA PLUS [id number]) MISC Use to test blood sugar daily and prn 100 each 0    Dulaglutide 0.75 MG/0.5ML SOPN Inject 0.75 mg into the skin once a week 12 pen 3    empagliflozin (JARDIANCE) 25 MG tablet Take 1 tablet by mouth daily 90 tablet 3    blood glucose monitor kit and supplies Dispense sufficient amount for indicated testing frequency plus additional to accommodate PRN testing needs. Dispense all needed supplies to include: monitor, strips, lancing device, lancets, control solutions, alcohol swabs. 1 kit 0    blood glucose monitor strips Test once a day & as needed for symptoms of irregular blood glucose. 100 strip 3    metFORMIN (GLUCOPHAGE) 500 MG tablet Take 1 tablet by mouth Daily with supper 30 tablet 5    atorvastatin (LIPITOR) 40 MG tablet Take 1 tablet by mouth nightly 90 tablet 3    meloxicam (MOBIC) 15 MG tablet Take 1 tablet by mouth daily 90 tablet 3    amLODIPine (NORVASC) 5 MG tablet Take 1 tablet by mouth daily 90 tablet 3    sildenafil (VIAGRA) 100 MG tablet Take 1 tablet by mouth as needed for Erectile Dysfunction 30 tablet 3    CPAP Machine MISC by Does not apply route nightly       No current facility-administered medications for this visit. Allergies: Patient has no known allergies. Past Medical History:   Diagnosis Date    ED (erectile dysfunction)     Fatigue     Hyperlipidemia     Hypertension     Obesity     KAREN on CPAP 2021    Osteoarthrosis     Snoring        History reviewed. No pertinent family history. Past Surgical History:   Procedure Laterality Date    ARM SURGERY Left     broken     KNEE SURGERY      Scope     VASECTOMY         Social History     Tobacco Use    Smoking status: Former Smoker     Packs/day: 0.50     Years: 10.00     Pack years: 5.00     Quit date: 2003     Years since quittin.6    Smokeless tobacco: Former User   Substance Use Topics    Alcohol use: No        Review of Systems   Constitutional: Positive for activity change ( He is trying to exercise more but limited by his knees). Negative for chills, fatigue and fever. HENT: Negative for congestion, ear pain and sore throat. Eyes: Negative for visual disturbance. Respiratory: Negative for cough, chest tightness, shortness of breath and wheezing. Uses CPAP regularly   Cardiovascular: Negative for chest pain, palpitations and leg swelling. Gastrointestinal: Negative for abdominal pain, diarrhea, nausea and vomiting. Endocrine: Negative for polyuria. Genitourinary: Negative for frequency and urgency. Musculoskeletal: Positive for arthralgias (knees are better after shots. ). Negative for back pain and neck pain. Skin: Negative for rash. Neurological: Negative for dizziness and headaches. Psychiatric/Behavioral: Negative for self-injury. The patient is not nervous/anxious. Physical Exam  Constitutional:       General: He is not in acute distress. Appearance: Normal appearance. He is well-developed. He is obese. HENT:      Head: Normocephalic and atraumatic. Right Ear: Tympanic membrane, ear canal and external ear normal.      Left Ear: Tympanic membrane, ear canal and external ear normal.      Nose: Nose normal.      Mouth/Throat:      Mouth: Mucous membranes are moist.      Pharynx: Oropharynx is clear. Eyes:      General: No scleral icterus. Extraocular Movements: Extraocular movements intact. Conjunctiva/sclera: Conjunctivae normal.      Pupils: Pupils are equal, round, and reactive to light. Neck:      Thyroid: No thyromegaly. Vascular: No carotid bruit or JVD. Cardiovascular:      Rate and Rhythm: Normal rate and regular rhythm. No extrasystoles are present. Chest Wall: PMI is not displaced. Pulses: Normal pulses. Heart sounds: Normal heart sounds, S1 normal and S2 normal. No murmur heard. No friction rub. No gallop. Pulmonary:      Effort: Pulmonary effort is normal. No respiratory distress. Breath sounds: Normal breath sounds. No wheezing, rhonchi or rales.    Abdominal:      General: Bowel sounds are normal.      Palpations: Abdomen is soft. Tenderness: There is no abdominal tenderness. There is no guarding or rebound. Genitourinary:     Comments: Exam deferred  Musculoskeletal:         General: No tenderness. Normal range of motion. Cervical back: Normal range of motion and neck supple. Comments: Crepitus in bilateral knees. Some occasional popping with movement   Lymphadenopathy:      Cervical: No cervical adenopathy. Skin:     General: Skin is warm and dry. Capillary Refill: Capillary refill takes less than 2 seconds. Findings: No rash. Neurological:      General: No focal deficit present. Mental Status: He is alert and oriented to person, place, and time. Sensory: No sensory deficit (no numbness or tingling). Psychiatric:         Mood and Affect: Mood normal.         Behavior: Behavior normal.         ASSESSMENT      ICD-10-CM    1. Screening for HIV (human immunodeficiency virus)  Z11.4 HIV Rapid 1&2   2. Type 2 diabetes mellitus without complication, without long-term current use of insulin ([facility])  E11.9 Comprehensive Metabolic Panel     Hemoglobin A1C     Microalbumin / Creatinine Urine Ratio   3. Essential hypertension  I10 CBC Auto Differential     Comprehensive Metabolic Panel     Microalbumin / Creatinine Urine Ratio     lisinopril-hydroCHLOROthiazide (PRINZIDE;ZESTORETIC) 20-25 MG per tablet   4. Mixed hyperlipidemia  E78.2 Lipid Panel   5. BMI 39.0-39.9,adult  Z68.39    6. Primary osteoarthritis of right knee  M17.11    7. Primary osteoarthritis of left knee  M17.12    8. KAREN on CPAP  G47.33     Z99.89    9. Fatigue, unspecified type  R53.83    10. Cellulitis of left elbow  L03.114 cephALEXin (KEFLEX) 500 MG capsule         PLAN    1. Screening for HIV (human immunodeficiency virus)  Check screening for HIV low risk patient  - HIV Rapid 1&2; Future    2.  Type 2 diabetes mellitus without complication, without long-term current use of insulin ([facility]  Reassess hemoglobin A1c on therapy  - Comprehensive Metabolic Panel; Future  - Hemoglobin A1C; Future  - Microalbumin / Creatinine Urine Ratio; Future    3. Essential hypertension  Blood pressure appropriately controlled. We are actually going to decrease his blood pressure regimen to once a day lisinoprilhydrochlorothiazide. We will continue to adjust his blood pressure as needed based upon weight loss  - CBC Auto Differential; Future  - Comprehensive Metabolic Panel; Future  - Microalbumin / Creatinine Urine Ratio; Future  - lisinopril-hydroCHLOROthiazide (PRINZIDE;ZESTORETIC) 20-25 MG per tablet; Take 1 tablet by mouth daily  Dispense: 30 tablet; Refill: 0    4. Mixed hyperlipidemia  Recheck lipids on therapy  - Lipid Panel; Future    5. BMI 39.0-39.9,adult  He is losing weight appropriately. Continue the same    6. Primary osteoarthritis of right knee  Injection in his right knee    7. Primary osteoarthritis of left knee  Injection in his left knee    8. KAREN on CPAP  He is faithfully using his CPAP    9. Fatigue, unspecified type  Check for metabolic etiology of his fatigue    10. Cellulitis of left elbow  Treat with Keflex 500 mg 3 times daily x10 days  - cephALEXin (KEFLEX) 500 MG capsule; Take 1 capsule by mouth 3 times daily for 10 days  Dispense: 30 capsule; Refill: 0      Orders Placed This Encounter   Procedures    CBC Auto Differential    Comprehensive Metabolic Panel    Hemoglobin A1C    Lipid Panel    Microalbumin / Creatinine Urine Ratio    HIV Rapid 1&2        Return in about 3 months (around 10/8/2021) for 30. This was an in-house visit    Knee Arthrocentesis with Injection Procedure Note    Pre-operative Diagnosis: left knee djd with pain    Post-operative Diagnosis: same    Indications: Symptom relief from osteoarthritis    Anesthesia: not required     Procedure Details     Verbal consent was obtained for the procedure. The joint was prepped with chlorhexadine.  The area was sprayed with Gebauer's Ethyl Chloride as a topical anesthetic and then a 22 gauge needle was inserted into the superior aspect of the joint from a lateral approach. 2 ml 1% lidocaine and 2 ml of DepoMedrol (40mg/ml) and 1ml Kenalog (40mg/ml)  was then injected into the joint. The needle was removed and the area cleansed and dressed. Complications:  None; patient tolerated the procedure well. Knee Arthrocentesis with Injection Procedure Note    Pre-operative Diagnosis: right knee djd with pain    Post-operative Diagnosis: same    Indications: Symptom relief from osteoarthritis    Anesthesia: not required     Procedure Details     Verbal consent was obtained for the procedure. The joint was prepped with chlorhexadine. The area was sprayed with Gebauer's Ethyl Chloride as a topical anesthetic and then a 22 gauge needle was inserted into the superior aspect of the joint from a lateral approach. 2 ml 1% lidocaine and 2 ml of DepoMedrol (40mg/ml) and 1ml Kenalog (40mg/ml)  was then injected into the joint. The needle was removed and the area cleansed and dressed. Complications:  None; patient tolerated the procedure well.

## 2021-07-09 ENCOUNTER — TELEPHONE (OUTPATIENT)
Dept: PRIMARY CARE CLINIC | Age: 46
End: 2021-07-09

## 2021-07-09 NOTE — TELEPHONE ENCOUNTER
----- Message from RENA Husain sent at 7/8/2021 11:44 AM CDT -----  CBC normal no anemia or concerns

## 2021-07-11 DIAGNOSIS — E11.9 TYPE 2 DIABETES MELLITUS WITHOUT COMPLICATION, WITHOUT LONG-TERM CURRENT USE OF INSULIN (HCC): Primary | ICD-10-CM

## 2021-07-13 NOTE — TELEPHONE ENCOUNTER
Received fax from pharmacy requesting refill on pts medication(s). Pt was last seen in office on 7/8/2021  and has a follow up scheduled for 7/12/2021. Will send request to  Dr. Porfirio Danielle  for patient.      Requested Prescriptions     Pending Prescriptions Disp Refills    metFORMIN (GLUCOPHAGE) 500 MG tablet 30 tablet 5     Sig: Take 1 tablet by mouth Daily with supper

## 2021-08-09 DIAGNOSIS — I10 ESSENTIAL HYPERTENSION: ICD-10-CM

## 2021-08-09 RX ORDER — LISINOPRIL AND HYDROCHLOROTHIAZIDE 25; 20 MG/1; MG/1
1 TABLET ORAL DAILY
Qty: 30 TABLET | Refills: 11 | Status: SHIPPED | OUTPATIENT
Start: 2021-08-09 | End: 2022-09-12

## 2021-08-09 NOTE — TELEPHONE ENCOUNTER
Requested Prescriptions     Pending Prescriptions Disp Refills    lisinopril-hydroCHLOROthiazide (PRINZIDE;ZESTORETIC) 20-25 MG per tablet 30 tablet 11     Sig: Take 1 tablet by mouth daily

## 2021-10-08 ENCOUNTER — OFFICE VISIT (OUTPATIENT)
Dept: PRIMARY CARE CLINIC | Age: 46
End: 2021-10-08
Payer: COMMERCIAL

## 2021-10-08 ENCOUNTER — TELEPHONE (OUTPATIENT)
Dept: PRIMARY CARE CLINIC | Age: 46
End: 2021-10-08

## 2021-10-08 VITALS
SYSTOLIC BLOOD PRESSURE: 130 MMHG | BODY MASS INDEX: 39.1 KG/M2 | WEIGHT: 295 LBS | HEIGHT: 73 IN | OXYGEN SATURATION: 98 % | TEMPERATURE: 97.9 F | DIASTOLIC BLOOD PRESSURE: 84 MMHG | HEART RATE: 69 BPM

## 2021-10-08 DIAGNOSIS — R53.83 FATIGUE, UNSPECIFIED TYPE: ICD-10-CM

## 2021-10-08 DIAGNOSIS — E11.9 TYPE 2 DIABETES MELLITUS WITHOUT COMPLICATION, WITHOUT LONG-TERM CURRENT USE OF INSULIN (HCC): ICD-10-CM

## 2021-10-08 DIAGNOSIS — M17.11 PRIMARY OSTEOARTHRITIS OF RIGHT KNEE: Primary | ICD-10-CM

## 2021-10-08 DIAGNOSIS — R79.89 LOW TESTOSTERONE IN MALE: ICD-10-CM

## 2021-10-08 DIAGNOSIS — E78.2 MIXED HYPERLIPIDEMIA: ICD-10-CM

## 2021-10-08 DIAGNOSIS — I10 ESSENTIAL HYPERTENSION: ICD-10-CM

## 2021-10-08 DIAGNOSIS — N52.9 ED (ERECTILE DYSFUNCTION) OF ORGANIC ORIGIN: ICD-10-CM

## 2021-10-08 PROCEDURE — 99214 OFFICE O/P EST MOD 30 MIN: CPT | Performed by: PEDIATRICS

## 2021-10-08 PROCEDURE — 20610 DRAIN/INJ JOINT/BURSA W/O US: CPT | Performed by: PEDIATRICS

## 2021-10-08 RX ORDER — TRIAMCINOLONE ACETONIDE 40 MG/ML
40 INJECTION, SUSPENSION INTRA-ARTICULAR; INTRAMUSCULAR ONCE
Status: COMPLETED | OUTPATIENT
Start: 2021-10-08 | End: 2021-10-08

## 2021-10-08 RX ORDER — METHYLPREDNISOLONE ACETATE 80 MG/ML
80 INJECTION, SUSPENSION INTRA-ARTICULAR; INTRALESIONAL; INTRAMUSCULAR; SOFT TISSUE ONCE
Status: COMPLETED | OUTPATIENT
Start: 2021-10-08 | End: 2021-10-08

## 2021-10-08 RX ADMIN — TRIAMCINOLONE ACETONIDE 40 MG: 40 INJECTION, SUSPENSION INTRA-ARTICULAR; INTRAMUSCULAR at 09:53

## 2021-10-08 RX ADMIN — METHYLPREDNISOLONE ACETATE 80 MG: 80 INJECTION, SUSPENSION INTRA-ARTICULAR; INTRALESIONAL; INTRAMUSCULAR; SOFT TISSUE at 09:52

## 2021-10-08 ASSESSMENT — ENCOUNTER SYMPTOMS
VOMITING: 0
CHEST TIGHTNESS: 0
ABDOMINAL PAIN: 0
BACK PAIN: 0
SORE THROAT: 0
SHORTNESS OF BREATH: 0
NAUSEA: 0
DIARRHEA: 0
COUGH: 0
WHEEZING: 0

## 2021-10-08 NOTE — TELEPHONE ENCOUNTER
----- Message from Fito Mcfadden DO sent at 10/8/2021 12:21 PM CDT -----  Hemoglobin A1c is 5. 9. This indicates excellent blood sugar control the past 3 months.

## 2021-10-08 NOTE — PROGRESS NOTES
[address] [place], [address]  Phone [phone]   Fax [phone]      OFFICE VISIT: 10/8/2021    Alyce Harper-: 1975      HPI  Reason For Visit:  Alyce Hoff is a 48 y.o.     3 Month Follow-Up, Diabetes Care Management, Knee Pain (would like an injection), and Knee Injury (slipped and fell in September and injured the right knee)    Patient presents on follow-up for multiple issues. Present concerns:    Knee DJD  He is wanting a knee injection today. He reinjured his right knee when he fell last month. He has had injections in the past and they have been helpful. Diabetes Mellitus Type 2  Diet compliance:  he is trying to be compliant  Nutrition Consultation Needed:  no  Medication:              Jardiance 25 mg daily              Dulaglutide 0.75 mg subcu weekly              Metformin 500 mg daily with meal  Medication compliance:  compliant most of the time  Weight trend: decreasing. Weight is down 6 lb 8oz from 3 months ago  Current exercise: yes -but limited due to his arthritic knee pain  Checking: occasionally  Home blood sugar records: fasting range: 85-90  Low BG:  no  Eye exam current (within one year): yes  Checking Feet regularly:  yes - no sores  ACE/ARB:  yes - lisinopril  Aspirin: No, but recommended. Tobacco history: He  reports that he quit smoking about 17 years ago. He has a 5.00 pack-year smoking history.  He has quit using smokeless tobacco.    Lab Results   Component Value Date    LABA1C 6.0 2021    LABA1C 6.5 (H) 2021    LABA1C 9.2 (H) 2021     Lab Results   Component Value Date    LABMICR <1.20 2021    CREATININE 0.8 2021       Hypertension:   BP today was   BP Readings from Last 1 Encounters:   10/08/21 130/84      Recent BP readings:    BP Readings from Last 3 Encounters:   10/08/21 130/84   21 110/62   21 128/86     Medication              Lisinoprilhydrochlorothiazide 20-12.5 mg twice daily Amlodipine 5 mg daily   Medication compliance:  compliant most of the time  Home blood pressure monitoring: No.    He is somewhat adherent to a low sodium diet. Symptoms: none  Laboratory:  Lab Results   Component Value Date    BUN 15 07/08/2021    CREATININE 0.8 07/08/2021       Hyperlipidemia:   Medication:   atorvastatin (Lipitor)  Low Fat, Low Choleterol Diet:  yes - he is trying  Myalgias or GI upset: no  The patient exercises intermittently. Laboratory:    Lab Results   Component Value Date    CHOL 111 (L) 07/08/2021    TRIG 124 07/08/2021    HDL 35 (L) 07/08/2021    LDLCALC 51 07/08/2021    LDLDIRECT 128 06/09/2015      Lab Results   Component Value Date    ALT 14 07/08/2021    AST 18 07/08/2021          height is 6' 1\" (1.854 m) and weight is 295 lb (133.8 kg). His temporal temperature is 97.9 °F (36.6 °C). His blood pressure is 130/84 and his pulse is 69. His oxygen saturation is 98%. Body mass index is 38.92 kg/m².     I have reviewed the following with the [facility]   Lab Review  Orders Only on 07/08/2021   Component Date Value    Rapid HIV 1&2 07/08/2021 Non-reactive     HIV-1 P24 Ag 07/08/2021 Non-reactive     Microalbumin, Random Uri* 07/08/2021 <1.20     Creatinine, Ur 07/08/2021 89.6     Microalbumin Creatinine * 07/08/2021 see below     Cholesterol, Total 07/08/2021 111*    Triglycerides 07/08/2021 124     HDL 07/08/2021 35*    LDL Calculated 07/08/2021 51     Hemoglobin A1C 07/08/2021 6.0     Sodium 07/08/2021 139     Potassium 07/08/2021 3.8     Chloride 07/08/2021 103     CO2 07/08/2021 22     Anion Gap 07/08/2021 14     Glucose 07/08/2021 98     BUN 07/08/2021 15     CREATININE 07/08/2021 0.8     GFR Non- 07/08/2021 >60     GFR  07/08/2021 >59     Calcium 07/08/2021 9.8     Total Protein 07/08/2021 7.6     Albumin 07/08/2021 4.4     Total Bilirubin 07/08/2021 0.6     Alkaline Phosphatase 07/08/2021 154*    ALT 07/08/2021 14  AST 07/08/2021 18     WBC 07/08/2021 9.8     RBC 07/08/2021 5.13     Hemoglobin 07/08/2021 15.7     Hematocrit 07/08/2021 47.5     MCV 07/08/2021 92.6     MCH 07/08/2021 30.6     MCHC 07/08/2021 33.1     RDW 07/08/2021 13.8     Platelets 75/01/8863 359     MPV 07/08/2021 9.5     Neutrophils % 07/08/2021 68.1*    Lymphocytes % 07/08/2021 19.3*    Monocytes % 07/08/2021 8.9     Eosinophils % 07/08/2021 2.4     Basophils % 07/08/2021 0.8     Neutrophils Absolute 07/08/2021 6.7     Immature Granulocytes # 07/08/2021 0.1     Lymphocytes Absolute 07/08/2021 1.9     Monocytes Absolute 07/08/2021 0.90     Eosinophils Absolute 07/08/2021 0.20     Basophils Absolute 07/08/2021 0.10      Copies of these are in the chart. Current Outpatient Medications   Medication Sig Dispense Refill    lisinopril-hydroCHLOROthiazide (PRINZIDE;ZESTORETIC) 20-25 MG per tablet Take 1 tablet by mouth daily 30 tablet 11    metFORMIN (GLUCOPHAGE) 500 MG tablet Take 1 tablet by mouth Daily with supper 90 tablet 3    Lancets (ONETOUCH DELICA PLUS [id number]) MISC Use to test blood sugar daily and prn 100 each 0    Dulaglutide 0.75 MG/0.5ML SOPN Inject 0.75 mg into the skin once a week 12 pen 3    empagliflozin (JARDIANCE) 25 MG tablet Take 1 tablet by mouth daily 90 tablet 3    blood glucose monitor kit and supplies Dispense sufficient amount for indicated testing frequency plus additional to accommodate PRN testing needs. Dispense all needed supplies to include: monitor, strips, lancing device, lancets, control solutions, alcohol swabs. 1 kit 0    blood glucose monitor strips Test once a day & as needed for symptoms of irregular blood glucose.  100 strip 3    atorvastatin (LIPITOR) 40 MG tablet Take 1 tablet by mouth nightly 90 tablet 3    meloxicam (MOBIC) 15 MG tablet Take 1 tablet by mouth daily 90 tablet 3    amLODIPine (NORVASC) 5 MG tablet Take 1 tablet by mouth daily 90 tablet 3    sildenafil (VIAGRA) 100 MG tablet Take 1 tablet by mouth as needed for Erectile Dysfunction 30 tablet 3    CPAP Machine MISC by Does not apply route nightly       No current facility-administered medications for this visit. Allergies: Patient has no known allergies. Past Medical History:   Diagnosis Date    ED (erectile dysfunction)     Fatigue     Hyperlipidemia     Hypertension     Obesity     KAREN on CPAP 2021    Osteoarthrosis     Snoring        No family history on file. Past Surgical History:   Procedure Laterality Date    ARM SURGERY Left     broken     KNEE SURGERY      Scope     VASECTOMY         Social History     Tobacco Use    Smoking status: Former Smoker     Packs/day: 0.50     Years: 10.00     Pack years: 5.00     Quit date: 2003     Years since quittin.8    Smokeless tobacco: Former User   Substance Use Topics    Alcohol use: No        Review of Systems   Constitutional: Positive for activity change ( He is trying to exercise more but limited by his knees). Negative for chills, fatigue and fever. HENT: Negative for congestion, ear pain and sore throat. Eyes: Negative for visual disturbance. Respiratory: Negative for cough, chest tightness, shortness of breath and wheezing. Uses CPAP regularly   Cardiovascular: Negative for chest pain, palpitations and leg swelling. Gastrointestinal: Negative for abdominal pain, diarrhea, nausea and vomiting. Endocrine: Negative for polyuria. Genitourinary: Negative for frequency and urgency. Musculoskeletal: Positive for arthralgias (knees are better after shots. ). Negative for back pain and neck pain. Skin: Negative for rash. Neurological: Negative for dizziness and headaches. Psychiatric/Behavioral: Negative for self-injury. The patient is not nervous/anxious. Physical Exam  Constitutional:       General: He is not in acute distress. Appearance: Normal appearance. He is well-developed. He is obese.    HENT: Head: Normocephalic and atraumatic. Right Ear: Tympanic membrane, ear canal and external ear normal.      Left Ear: Tympanic membrane, ear canal and external ear normal.      Nose: Nose normal.      Mouth/Throat:      Mouth: Mucous membranes are moist.      Pharynx: Oropharynx is clear. Eyes:      General: No scleral icterus. Extraocular Movements: Extraocular movements intact. Conjunctiva/sclera: Conjunctivae normal.      Pupils: Pupils are equal, round, and reactive to light. Neck:      Thyroid: No thyromegaly. Vascular: No carotid bruit or JVD. Cardiovascular:      Rate and Rhythm: Normal rate and regular rhythm. No extrasystoles are present. Chest Wall: PMI is not displaced. Pulses: Normal pulses. Heart sounds: Normal heart sounds, S1 normal and S2 normal. No murmur heard. No friction rub. No gallop. Pulmonary:      Effort: Pulmonary effort is normal. No respiratory distress. Breath sounds: Normal breath sounds. No wheezing, rhonchi or rales. Abdominal:      General: Bowel sounds are normal.      Palpations: Abdomen is soft. Tenderness: There is no abdominal tenderness. There is no guarding or rebound. Genitourinary:     Comments: Exam deferred  Musculoskeletal:         General: No tenderness. Normal range of motion. Cervical back: Normal range of motion and neck supple. Comments: Crepitus in bilateral knees. Some occasional popping with movement   Lymphadenopathy:      Cervical: No cervical adenopathy. Skin:     General: Skin is warm and dry. Capillary Refill: Capillary refill takes less than 2 seconds. Findings: No rash. Neurological:      General: No focal deficit present. Mental Status: He is alert and oriented to person, place, and time. Sensory: No sensory deficit (no numbness or tingling).    Psychiatric:         Mood and Affect: Mood normal.         Behavior: Behavior normal.         ASSESSMENT ICD-10-CM    1. Primary osteoarthritis of right knee  M17.11 triamcinolone acetonide (KENALOG-40) injection 40 mg     methylPREDNISolone acetate (DEPO-MEDROL) injection 80 mg     DE DRAIN/INJECT LARGE JOINT/BURSA   2. Type 2 diabetes mellitus without complication, without long-term current use of insulin ([facility])  E11.9 Comprehensive Metabolic Panel     Hemoglobin A1C     Testosterone Free and Total Male   3. Essential hypertension  I10    4. Mixed hyperlipidemia  E78.2    5. Fatigue, unspecified type  R53.83 Testosterone Free and Total Male   6. ED (erectile dysfunction) of organic origin  N52.9 Testosterone Free and Total Male   7. Low testosterone in male  R74.80 Testosterone Free and Total Male         PLAN    1. Primary osteoarthritis of right knee  Will inject the knee today  - triamcinolone acetonide (KENALOG-40) injection 40 mg  - methylPREDNISolone acetate (DEPO-MEDROL) injection 80 mg  - DE DRAIN/INJECT LARGE JOINT/BURSA    2. Type 2 diabetes mellitus without complication, without long-term current use of insulin ([facility] [address]  Will continue present regimen. - Comprehensive Metabolic Panel; Future  - Hemoglobin A1C; Future  - Testosterone Free and Total Male; Future    3. Essential hypertension  The current medical regimen is effective;  continue present plan and medications. 4. Mixed hyperlipidemia  Now excellently controlled on regimen    5. Fatigue, unspecified type  Will check testosterone as this has been low in the past.  - Testosterone Free and Total Male; Future    6. ED (erectile dysfunction) of organic origin  Check T level  - Testosterone Free and Total Male; Future    7. Low testosterone in male  Check level. - Testosterone Free and Total Male; Future      Orders Placed This Encounter   Procedures    Comprehensive Metabolic Panel    Hemoglobin A1C    Testosterone Free and Total Male    DE DRAIN/INJECT LARGE JOINT/BURSA        Return in about 3 months (around 1/8/2022) for 30.            Knee Arthrocentesis with Injection Procedure Note    Pre-operative Diagnosis: right knee djd with pain    Post-operative Diagnosis: same    Indications: Symptom relief from osteoarthritis    Anesthesia: not required     Procedure Details     Verbal consent was obtained for the procedure. The joint was prepped with chlorhexadine. The area was sprayed with Gebauer's Ethyl Chloride as a topical anesthetic and then a 22 gauge needle was inserted into the superior aspect of the joint from a lateral approach. 2 ml 1% lidocaine and 2 ml of DepoMedrol (40mg/ml) and 1ml Kenalog (40mg/ml)  was then injected into the joint. The needle was removed and the area cleansed and dressed. Complications:  None; patient tolerated the procedure well.         This was an in-house visit

## 2021-10-13 ENCOUNTER — TELEPHONE (OUTPATIENT)
Dept: PRIMARY CARE CLINIC | Age: 46
End: 2021-10-13

## 2021-10-13 NOTE — TELEPHONE ENCOUNTER
----- Message from Jayant Bar DO sent at 10/12/2021  5:11 PM CDT -----  Testosterone is in the normal range.

## 2021-12-20 DIAGNOSIS — I10 ESSENTIAL HYPERTENSION: ICD-10-CM

## 2021-12-20 RX ORDER — AMLODIPINE BESYLATE 5 MG/1
5 TABLET ORAL DAILY
Qty: 90 TABLET | Refills: 3 | Status: SHIPPED | OUTPATIENT
Start: 2021-12-20 | End: 2022-01-14 | Stop reason: SDUPTHER

## 2021-12-20 NOTE — TELEPHONE ENCOUNTER
Received fax from pharmacy requesting refill on pts medication(s). Pt was last seen in office on 10/8/2021  and has a follow up scheduled for 1/7/2022. Will send request to  Dr. Sumeet Baum  for patient.      Requested Prescriptions     Pending Prescriptions Disp Refills    amLODIPine (NORVASC) 5 MG tablet 90 tablet 3     Sig: Take 1 tablet by mouth daily

## 2022-01-14 DIAGNOSIS — E78.2 MIXED HYPERLIPIDEMIA: ICD-10-CM

## 2022-01-14 DIAGNOSIS — M17.12 PRIMARY OSTEOARTHRITIS OF LEFT KNEE: ICD-10-CM

## 2022-01-14 DIAGNOSIS — E11.9 TYPE 2 DIABETES MELLITUS WITHOUT COMPLICATION, WITHOUT LONG-TERM CURRENT USE OF INSULIN (HCC): ICD-10-CM

## 2022-01-14 DIAGNOSIS — M17.11 PRIMARY OSTEOARTHRITIS OF RIGHT KNEE: ICD-10-CM

## 2022-01-14 DIAGNOSIS — Z00.00 VISIT FOR PREVENTIVE HEALTH EXAMINATION: ICD-10-CM

## 2022-01-14 RX ORDER — MELOXICAM 15 MG/1
15 TABLET ORAL DAILY
Qty: 90 TABLET | Refills: 3 | Status: SHIPPED | OUTPATIENT
Start: 2022-01-14

## 2022-01-14 RX ORDER — AMLODIPINE BESYLATE 5 MG/1
5 TABLET ORAL DAILY
Qty: 90 TABLET | Refills: 3 | Status: SHIPPED | OUTPATIENT
Start: 2022-01-14

## 2022-01-14 RX ORDER — ATORVASTATIN CALCIUM 40 MG/1
40 TABLET, FILM COATED ORAL NIGHTLY
Qty: 90 TABLET | Refills: 3 | Status: SHIPPED | OUTPATIENT
Start: 2022-01-14

## 2022-02-08 ENCOUNTER — OFFICE VISIT (OUTPATIENT)
Dept: PRIMARY CARE CLINIC | Age: 47
End: 2022-02-08
Payer: COMMERCIAL

## 2022-02-08 VITALS
SYSTOLIC BLOOD PRESSURE: 130 MMHG | HEIGHT: 73 IN | BODY MASS INDEX: 39.49 KG/M2 | TEMPERATURE: 97 F | WEIGHT: 298 LBS | DIASTOLIC BLOOD PRESSURE: 84 MMHG | HEART RATE: 77 BPM | OXYGEN SATURATION: 98 %

## 2022-02-08 DIAGNOSIS — M17.11 PRIMARY OSTEOARTHRITIS OF RIGHT KNEE: ICD-10-CM

## 2022-02-08 DIAGNOSIS — E78.2 MIXED HYPERLIPIDEMIA: ICD-10-CM

## 2022-02-08 DIAGNOSIS — M17.12 PRIMARY OSTEOARTHRITIS OF LEFT KNEE: ICD-10-CM

## 2022-02-08 DIAGNOSIS — F52.21 ED (ERECTILE DYSFUNCTION) OF NON-ORGANIC ORIGIN: ICD-10-CM

## 2022-02-08 DIAGNOSIS — E11.9 TYPE 2 DIABETES MELLITUS WITHOUT COMPLICATION, WITHOUT LONG-TERM CURRENT USE OF INSULIN (HCC): Primary | ICD-10-CM

## 2022-02-08 DIAGNOSIS — I10 ESSENTIAL HYPERTENSION: ICD-10-CM

## 2022-02-08 PROCEDURE — 99214 OFFICE O/P EST MOD 30 MIN: CPT | Performed by: PEDIATRICS

## 2022-02-08 PROCEDURE — 20610 DRAIN/INJ JOINT/BURSA W/O US: CPT | Performed by: PEDIATRICS

## 2022-02-08 RX ORDER — SILDENAFIL 100 MG/1
100 TABLET, FILM COATED ORAL PRN
Qty: 30 TABLET | Refills: 3 | Status: SHIPPED | OUTPATIENT
Start: 2022-02-08

## 2022-02-08 ASSESSMENT — ENCOUNTER SYMPTOMS
ABDOMINAL PAIN: 0
CHEST TIGHTNESS: 0
WHEEZING: 0
DIARRHEA: 0
COUGH: 0
NAUSEA: 0
SHORTNESS OF BREATH: 0
VOMITING: 0
BACK PAIN: 0
SORE THROAT: 0

## 2022-02-08 ASSESSMENT — PATIENT HEALTH QUESTIONNAIRE - PHQ9
2. FEELING DOWN, DEPRESSED OR HOPELESS: 0
SUM OF ALL RESPONSES TO PHQ QUESTIONS 1-9: 0
SUM OF ALL RESPONSES TO PHQ9 QUESTIONS 1 & 2: 0
SUM OF ALL RESPONSES TO PHQ QUESTIONS 1-9: 0
SUM OF ALL RESPONSES TO PHQ QUESTIONS 1-9: 0
1. LITTLE INTEREST OR PLEASURE IN DOING THINGS: 0
SUM OF ALL RESPONSES TO PHQ QUESTIONS 1-9: 0

## 2022-02-08 NOTE — PATIENT INSTRUCTIONS
Patient Education        Noninsulin Medicines for Type 2 Diabetes: Care Instructions  Overview     There are different types of noninsulin medicines for diabetes. Each works in a different way. But they all help you control your blood sugar. Some types help your body make insulin to lower your blood sugar. Others lower how much insulin your body needs. Some can slow how fast your body digests sugars. And some can remove extra glucose through your urine. You may need to take more than one medicine for diabetes. Two or more medicines may work better to lower your blood sugar level than just one does. · Metformin. This lowers how much glucose your liver makes. And it helps you respond better to insulin. It also lowers the amount of stored sugar that your liver releases when you are not eating. · Sulfonylureas. These help your body release more insulin. Some work for many hours. They can cause low blood sugar if you don't eat as you planned. An example is glipizide. · Thiazolidinediones. These reduce the amount of blood glucose. They also help you respond better to insulin. An example is pioglitazone. · SGLT2 inhibitors. These help to remove extra glucose through your urine. They may also help some people lose weight. An example is ertugliflozin. · DPP-4 inhibitors. These help your body raise the level of insulin after you eat. They also help your body make less of a hormone that raises blood sugar. An example is alogliptin. · GLP-1 receptor agonists. These help your body make a protein that can raise your insulin level and make you less hungry. They're given as shots or pills. An example is semaglutide. · Meglitinides. These help your body release insulin. They also help slow how your body digests sugars. So they can keep your blood sugar from rising too fast after you eat. · Alpha-glucosidase inhibitors. These keep starches from breaking down.  This means that they lower the amount of glucose absorbed when you eat. They don't help your body make more insulin. So they will not cause low blood sugar unless you use them with other medicines for diabetes. Follow-up care is a key part of your treatment and safety. Be sure to make and go to all appointments, and call your doctor if you are having problems. It's also a good idea to know your test results and keep a list of the medicines you take. How can you care for yourself at home? · Eat a healthy diet. Get some exercise each day. This may help you to reduce how much medicine you need. · Do not take other prescription or over-the-counter medicines, vitamins, herbal products, or supplements without talking to your doctor first. Some medicines for type 2 diabetes can cause problems with other medicines or supplements. · Tell your doctor if you plan to get pregnant. Some of these drugs are not safe for pregnant women. · Be safe with medicines. Take your medicines exactly as prescribed. Meglitinides and sulfonylureas can cause your blood sugar to drop very low. Call your doctor if you think you are having a problem with your medicine. · Check your blood sugar often. You can use a glucose monitor. Keeping track can help you know how certain foods, activities, and medicines affect your blood sugar. And it can help you keep your blood sugar from getting so low that it's not safe. When should you call for help? Call 911 anytime you think you may need emergency care. For example, call if:    · You passed out (lost consciousness).     · You are confused or cannot think clearly.     · Your blood sugar is very high or very low. Watch closely for changes in your health, and be sure to contact your doctor if:    · Your blood sugar stays outside the level your doctor set for you.     · You have any problems. Where can you learn more? Go to [email or web address].[email or web address] org and sign in to your [employer] account.  Enter H153 in the [employer] box to learn more about \"Noninsulin Medicines for Type 2 Diabetes: Care Instructions. \"     If you do not have an account, please click on the \"Sign Up Now\" link. Current as of: July 28, 2021               Content Version: 13.1  © 8679-1125 Healthwise, Incorporated. Care instructions adapted under license by [facility] ([facility]). If you have questions about a medical condition or this instruction, always ask your healthcare professional. [place] [address] any warranty or liability for your use of this information. Patient Education        Learning About Meal Planning for Diabetes  Why plan your meals? Meal planning can be a key part of managing diabetes. Planning meals and snacks with the right balance of carbohydrate, protein, and fat can help you keep your blood sugar at the target level you set with your doctor. You don't have to eat special foods. You can eat what your family eats, including sweets once in a while. But you do have to pay attention to how often you eat and how much you eat of certain foods. You may want to work with a dietitian or a certified diabetes educator. He or she can give you tips and meal ideas and can answer your questions about meal planning. This health professional can also help you reach a healthy weight if that is one of your goals. What plan is right for you? Your dietitian or diabetes educator may suggest that you start with the plate format or carbohydrate counting. The plate format  The plate format is a simple way to help you manage how you eat. You plan meals by learning how much space each food should take on a plate. Using the plate format helps you spread carbohydrate throughout the day. It can make it easier to keep your blood sugar level within your target range. It also helps you see if you're eating healthy portion sizes. To use the plate format, you put non-starchy vegetables on half your plate. Add meat or meat substitutes on one-quarter of the plate. Put a grain or starchy vegetable (such as brown rice or a potato) on the final quarter of the plate. You can add a small piece of fruit and some low-fat or fat-free milk or yogurt, depending on your carbohydrate goal for each meal.  Here are some tips for using the plate format:  · Make sure that you are not using an oversized plate. A 9-inch plate is best. Many restaurants use larger plates. · Get used to using the plate format at home. Then you can use it when you eat out. · Write down your questions about using the plate format. Talk to your doctor, a dietitian, or a diabetes educator about your concerns. Carbohydrate counting  With carbohydrate counting, you plan meals based on the amount of carbohydrate in each food. Carbohydrate raises blood sugar higher and more quickly than any other nutrient. It is found in desserts, breads and cereals, and fruit. It's also found in starchy vegetables such as potatoes and corn, grains such as rice and pasta, and milk and yogurt. Spreading carbohydrate throughout the day helps keep your blood sugar levels within your target range. Your daily amount depends on several things, including your weight, how active you are, which diabetes medicines you take, and what your goals are for your blood sugar levels. A registered dietitian or diabetes educator can help you plan how much carbohydrate to include in each meal and snack. A guideline for your daily amount of carbohydrate is:  · 45 to 60 grams at each meal. That's about the same as 3 to 4 carbohydrate servings. · 15 to 20 grams at each snack. That's about the same as 1 carbohydrate serving. The Nutrition Facts label on packaged foods tells you how much carbohydrate is in a serving of the food. First, look at the serving size on the food label. Is that the amount you eat in a serving? All of the nutrition information on a food label is based on that serving size.  So if you eat more or less than that, you'll need to adjust the other numbers. Total carbohydrate is the next thing you need to look for on the label. If you count carbohydrate servings, one serving of carbohydrate is 15 grams. For foods that don't come with labels, such as fresh fruits and vegetables, you'll need a guide that lists carbohydrate in these foods. Ask your doctor, dietitian, or diabetes educator about books or other nutrition guides you can use. If you take insulin, you need to know how many grams of carbohydrate are in a meal. This lets you know how much rapid-acting insulin to take before you eat. If you use an insulin pump, you get a constant rate of insulin during the day. So the pump must be programmed at meals to give you extra insulin to cover the rise in blood sugar after meals. When you know how much carbohydrate you will eat, you can take the right amount of insulin. Or, if you always use the same amount of insulin, you need to make sure that you eat the same amount of carbohydrate at meals. If you need more help to understand carbohydrate counting and food labels, ask your doctor, dietitian, or diabetes educator. How can you plan healthy meals? Here are some tips to get started:  · Plan your meals a week at a time. Don't forget to include snacks too. · Use cookbooks or online recipes to plan several main meals. Plan some quick meals for busy nights. You also can double some recipes that freeze well. Then you can save half for other busy nights when you don't have time to cook. · Make sure you have the ingredients you need for your recipes. If you're running low on basic items, put these items on your shopping list too. · List foods that you use to make breakfasts, lunches, and snacks. List plenty of fruits and vegetables. · Post this list on the refrigerator. Add to it as you think of more things you need. · Take the list to the store to do your weekly shopping. Follow-up care is a key part of your treatment and safety.  Be sure to make for about 6 seconds, then rest for up to 10 seconds. 4. Repeat 8 to 12 times. 5. Switch legs and repeat steps 1 through 4, even if only one knee is sore. Straight-leg raises to the front    1. Lie on your back with your good knee bent so that your foot rests flat on the floor. Your affected leg should be straight. Make sure that your low back has a normal curve. You should be able to slip your hand in between the floor and the small of your back, with your palm touching the floor and your back touching the back of your hand. 2. Tighten the thigh muscles in your affected leg by pressing the back of your knee flat down to the floor. Hold your knee straight. 3. Keeping the thigh muscles tight and your leg straight, lift your affected leg up so that your heel is about 12 inches off the floor. Hold for about 6 seconds, then lower slowly. 4. Relax for up to 10 seconds between repetitions. 5. Repeat 8 to 12 times. 6. Switch legs and repeat steps 1 through 5, even if only one knee is sore. Active knee flexion    1. Lie on your stomach with your knees straight. If your kneecap is uncomfortable, roll up a washcloth and put it under your leg just above your kneecap. 2. Lift the foot of your affected leg by bending the knee so that you bring the foot up toward your buttock. If this motion hurts, try it without bending your knee quite as far. This may help you avoid any painful motion. 3. Slowly move your leg up and down. 4. Repeat 8 to 12 times. 5. Switch legs and repeat steps 1 through 4, even if only one knee is sore. Quadriceps stretch (facedown)    1. Lie flat on your stomach, and rest your face on the floor. 2. Wrap a towel or belt strap around the lower part of your affected leg. Then use the towel or belt strap to slowly pull your heel toward your buttock until you feel a stretch. 3. Hold for about 15 to 30 seconds, then relax your leg against the towel or belt strap. 4. Repeat 2 to 4 times.   5. Switch legs and repeat steps 1 through 4, even if only one knee is sore. Stationary exercise bike    1. If you do not have a stationary exercise bike at home, you can find one to ride at your local health club or community center. 2. Adjust the height of the bike seat so that your knee is slightly bent when your leg is extended downward. If your knee hurts when the pedal reaches the top, you can raise the seat so that your knee does not bend as much. 3. Start slowly. At first, try to do 5 to 10 minutes of cycling with little to no resistance. Then increase your time and the resistance bit by bit until you can do 20 to 30 minutes without pain. 4. If you start to have pain, rest your knee until your pain gets back to the level that is normal for you. Or cycle for less time or with less effort. Follow-up care is a key part of your treatment and safety. Be sure to make and go to all appointments, and call your doctor if you are having problems. It's also a good idea to know your test results and keep a list of the medicines you take. Where can you learn more? Go to [email or web address] and sign in to your [employer] account. Enter C159 in the [facility] box to learn more about \"Knee Arthritis: Exercises. \"     If you do not have an account, please click on the \"Sign Up Now\" link. Current as of: July 1, 2021               Content Version: 13.1  © 9842-7026 Healthwise, [facility]. Care instructions adapted under license by [facility] ([facility]). If you have questions about a medical condition or this instruction, always ask your healthcare professional. [place] [address] any warranty or liability for your use of this information. Patient Education        Knee: Exercises  Introduction  Here are some examples of exercises for you to try. The exercises may be suggested for a condition or for rehabilitation. Start each exercise slowly.  Ease off the exercises if you start to have pain. You will be told when to start these exercises and which ones will work best for you. How to do the exercises  Quad sets    1. Sit with your leg straight and supported on the floor or a firm bed. (If you feel discomfort in the front or back of your knee, place a small towel roll under your knee.)  2. Tighten the muscles on top of your thigh by pressing the back of your knee flat down to the floor. (If you feel discomfort under your kneecap, place a small towel roll under your knee.)  3. Hold for about 6 seconds, then rest for up to 10 seconds. 4. Do 8 to 12 repetitions several times a day. Straight-leg raises to the front    1. Lie on your back with your good knee bent so that your foot rests flat on the floor. Your injured leg should be straight. Make sure that your low back has a normal curve. You should be able to slip your flat hand in between the floor and the small of your back, with your palm touching the floor and your back touching the back of your hand. 2. Tighten the thigh muscles in the injured leg by pressing the back of your knee flat down to the floor. Hold your knee straight. 3. Keeping the thigh muscles tight, lift your injured leg up so that your heel is about 12 inches off the floor. Hold for about 6 seconds and then lower slowly. 4. Do 8 to 12 repetitions, 3 times a day. Straight-leg raises to the outside    1. Lie on your side, with your injured leg on top. 2. Tighten the front thigh muscles of your injured leg to keep your knee straight. 3. Keep your hip and your leg straight in line with the rest of your body, and keep your knee pointing forward. Do not drop your hip back. 4. Lift your injured leg straight up toward the ceiling, about 12 inches off the floor. Hold for about 6 seconds, then slowly lower your leg. 5. Do 8 to 12 repetitions. Straight-leg raises to the back    1. Lie on your stomach, and lift your leg straight up behind you (toward the ceiling).   2. Lift your toes about 6 inches off the floor, hold for about 6 seconds, then lower slowly. 3. Do 8 to 12 repetitions. Straight-leg raises to the inside    1. Lie on the side of your body with the injured leg. 2. You can either prop your other (good) leg up on a chair, or you can bend your good knee and put that foot in front of your injured knee. Do not drop your hip back. 3. Tighten the muscles on the front of your thigh to straighten your injured knee. 4. Keep your kneecap pointing forward, and lift your whole leg up toward the ceiling about 6 inches. Hold for about 6 seconds, then lower slowly. 5. Do 8 to 12 repetitions. Heel dig bridging    1. Lie on your back with both knees bent and your ankles bent so that only your heels are digging into the floor. Your knees should be bent about 90 degrees. 2. Then push your heels into the floor, squeeze your buttocks, and lift your hips off the floor until your shoulders, hips, and knees are all in a straight line. 3. Hold for about 6 seconds as you continue to breathe normally, and then slowly lower your hips back down to the floor and rest for up to 10 seconds. 4. Do 8 to 12 repetitions. Hamstring curls    1. Lie on your stomach with your knees straight. If your kneecap is uncomfortable, roll up a washcloth and put it under your leg just above your kneecap. 2. Lift the foot of your injured leg by bending the knee so that you bring the foot up toward your buttock. If this motion hurts, try it without bending your knee quite as far. This may help you avoid any painful motion. 3. Slowly lower your leg back to the floor. 4. Do 8 to 12 repetitions. 5. With permission from your doctor or physical therapist, you may also want to add a cuff weight to your ankle (not more than 5 pounds). With weight, you do not have to lift your leg more than 12 inches to get a hamstring workout.   Shallow standing knee bends    Do this exercise only if you have very little pain; if you have no clicking, locking, or giving way if you have an injured knee; and if it does not hurt while you are doing 8 to 12 repetitions. 1. Stand with your hands lightly resting on a counter or chair in front of you. Put your feet shoulder-width apart. 2. Slowly bend your knees so that you squat down like you are going to sit in a chair. Make sure your knees do not go in front of your toes. 3. Lower yourself about 6 inches. Your heels should remain on the floor at all times. 4. Rise slowly to a standing position. Heel raises    1. Stand with your feet a few inches apart, with your hands lightly resting on a counter or chair in front of you. 2. Slowly raise your heels off the floor while keeping your knees straight. 3. Hold for about 6 seconds, then slowly lower your heels to the floor. 4. Do 8 to 12 repetitions several times during the day. Follow-up care is a key part of your treatment and safety. Be sure to make and go to all appointments, and call your doctor if you are having problems. It's also a good idea to know your test results and keep a list of the medicines you take. Where can you learn more? Go to [email or web address] org and sign in to your [employer] account. Enter V597 in the [employer] box to learn more about \"Knee: Exercises. \"     If you do not have an account, please click on the \"Sign Up Now\" link. Current as of: July 1, 2021               Content Version: 13.1  © 2006-2021 Healthwise, Incorporated. Care instructions adapted under license by [facility] ([facility]). If you have questions about a medical condition or this instruction, always ask your healthcare professional. [address] any warranty or liability for your use of this information.

## 2022-02-08 NOTE — PROGRESS NOTES
[facility] [facility], [address]  Phone [phone]   Fax [phone]      OFFICE VISIT: 2022    Crow Harper-: 1975      [facility]  Reason For Visit:  Thomas Craig is a 48 y.o.     Knee Pain (bilateral knee injections)    Patient presents with bilateral knee pain and requesting bilateral knee injections. Last time he had knee injection was on 2021  He is injections have been very helpful in the past.      Knee pain:  Medication   Meloxicam 15 mg daily  Symptoms: He has ongoing issues with knee pain. The meloxicam is helpful but the knee injections help more      Diabetes Mellitus Type 2  Diet compliance:  he is trying to be compliant  Nutrition Consultation Needed:  no  Medication:              Jardiance 25 mg daily              Dulaglutide 0.75 mg subcu weekly              Metformin 500 mg daily with meal  Medication compliance:  compliant most of the time  Weight trend: decreasing.  Weight is up 3 lb  Current exercise: yes -but limited due to his arthritic knee pain  Checking: occasionally  Home blood sugar records: fasting range: 85-90  Low BG:  no  Eye exam current (within one year): yes  Checking Feet regularly:  yes - no sores  ACE/ARB:  yes - lisinopril  Aspirin: No, but recommended.   Tobacco history: He  reports that he quit smoking about 18 years ago. He has a 5.00 pack-year smoking history.  He has quit using smokeless tobacco.    Lab Results   Component Value Date    LABA1C 5.9 10/08/2021    LABA1C 6.0 2021    LABA1C 6.5 (H) 2021     Lab Results   Component Value Date    LABMICR <1.20 2021    CREATININE 0.8 10/08/2021       Hypertension:   BP today was   BP Readings from Last 1 Encounters:   22 130/84      Recent BP readings:    BP Readings from Last 3 Encounters:   22 130/84   10/08/21 130/84   21 110/62     Medication              Lisinoprilhydrochlorothiazide 20-12.5 mg twice daily              Amlodipine 5 mg daily Take 1 tablet by mouth daily 90 tablet 3    Dulaglutide 0.75 MG/0.5ML SOPN Inject 0.75 mg into the skin once a week 12 pen 3    atorvastatin (LIPITOR) 40 MG tablet Take 1 tablet by mouth nightly 90 tablet 3    meloxicam (MOBIC) 15 MG tablet Take 1 tablet by mouth daily 90 tablet 3    lisinopril-hydroCHLOROthiazide (PRINZIDE;ZESTORETIC) 20-25 MG per tablet Take 1 tablet by mouth daily 30 tablet 11    metFORMIN (GLUCOPHAGE) 500 MG tablet Take 1 tablet by mouth Daily with supper 90 tablet 3    empagliflozin (JARDIANCE) 25 MG tablet Take 1 tablet by mouth daily 90 tablet 3    Lancets (ONETOUCH DELICA PLUS [id number]) MISC Use to test blood sugar daily and prn 100 each 0    blood glucose monitor kit and supplies Dispense sufficient amount for indicated testing frequency plus additional to accommodate PRN testing needs. Dispense all needed supplies to include: monitor, strips, lancing device, lancets, control solutions, alcohol swabs. 1 kit 0    blood glucose monitor strips Test once a day & as needed for symptoms of irregular blood glucose. 100 strip 3    CPAP Machine MISC by Does not apply route nightly       No current facility-administered medications for this visit. Allergies: Patient has no known allergies. Past Medical History:   Diagnosis Date    ED (erectile dysfunction)     Fatigue     Hyperlipidemia     Hypertension     Obesity     KAREN on CPAP 2021    Osteoarthrosis     Snoring        No family history on file.     Past Surgical History:   Procedure Laterality Date    ARM SURGERY Left     broken     KNEE SURGERY      Scope     VASECTOMY         Social History     Tobacco Use    Smoking status: Former Smoker     Packs/day: 0.50     Years: 10.00     Pack years: 5.00     Quit date: 2003     Years since quittin.2    Smokeless tobacco: Former User   Substance Use Topics    Alcohol use: No        Review of Systems   Constitutional: Positive for activity change ( He is trying to exercise more but limited by his knees). Negative for chills, fatigue and fever. HENT: Negative for congestion, ear pain and sore throat. Eyes: Negative for visual disturbance. Respiratory: Negative for cough, chest tightness, shortness of breath and wheezing. Uses CPAP regularly   Cardiovascular: Negative for chest pain, palpitations and leg swelling. Gastrointestinal: Negative for abdominal pain, diarrhea, nausea and vomiting. Endocrine: Negative for polyuria. Genitourinary: Negative for frequency and urgency. Musculoskeletal: Positive for arthralgias (knees are better after shots. ). Negative for back pain and neck pain. Skin: Negative for rash. Neurological: Negative for dizziness and headaches. Psychiatric/Behavioral: Negative for self-injury. The patient is not nervous/anxious. Physical Exam  Constitutional:       General: He is not in acute distress. Appearance: Normal appearance. He is well-developed. He is obese. HENT:      Head: Normocephalic and atraumatic. Right Ear: Tympanic membrane, ear canal and external ear normal.      Left Ear: Tympanic membrane, ear canal and external ear normal.      Nose: Nose normal.      Mouth/Throat:      Mouth: Mucous membranes are moist.      Pharynx: Oropharynx is clear. Eyes:      General: No scleral icterus. Extraocular Movements: Extraocular movements intact. Conjunctiva/sclera: Conjunctivae normal.      Pupils: Pupils are equal, round, and reactive to light. Neck:      Thyroid: No thyromegaly. Vascular: No carotid bruit or JVD. Cardiovascular:      Rate and Rhythm: Normal rate and regular rhythm. No extrasystoles are present. Chest Wall: PMI is not displaced. Pulses: Normal pulses. Heart sounds: Normal heart sounds, S1 normal and S2 normal. No murmur heard. No friction rub. No gallop. Pulmonary:      Effort: Pulmonary effort is normal. No respiratory distress.       Breath sounds: Normal breath sounds. No wheezing, rhonchi or rales. Abdominal:      General: Bowel sounds are normal.      Palpations: Abdomen is soft. Tenderness: There is no abdominal tenderness. There is no guarding or rebound. Genitourinary:     Comments: Exam deferred  Musculoskeletal:         General: No tenderness. Normal range of motion. Cervical back: Normal range of motion and neck supple. Comments: Crepitus in bilateral knees. Some occasional popping with movement   Lymphadenopathy:      Cervical: No cervical adenopathy. Skin:     General: Skin is warm and dry. Capillary Refill: Capillary refill takes less than 2 seconds. Findings: No rash. Neurological:      General: No focal deficit present. Mental Status: He is alert and oriented to person, place, and time. Sensory: No sensory deficit (no numbness or tingling). Psychiatric:         Mood and Affect: Mood normal.         Behavior: Behavior normal.         ASSESSMENT      ICD-10-CM    1. Type 2 diabetes mellitus without complication, without long-term current use of insulin (HCC)  E11.9 Comprehensive Metabolic Panel     Hemoglobin A1C     Microalbumin / Creatinine Urine Ratio     T4, Free     TSH without Reflex   2. Essential hypertension  I10 CBC Auto Differential     Comprehensive Metabolic Panel     Microalbumin / Creatinine Urine Ratio   3. Mixed hyperlipidemia  E78.2 Lipid Panel   4. Primary osteoarthritis of right knee  M17.11    5. Primary osteoarthritis of left knee  M17.12    6. ED (erectile dysfunction) of non-organic origin  F52.21 sildenafil (VIAGRA) 100 MG tablet         PLAN    1. Type 2 diabetes mellitus without complication, without long-term current use of insulin (HCC)  Blood sugars remain well controlled by home measuring self attestation. We will recheck hemoglobin A1c as well as metabolic profile and urine microalbumin to ensure stability in that regard.   We did discuss the side effect profile of diabetes and the importance of maintaining excellent control. He believes he can be a little more diligent from a dietary standpoint and also from an exercise standpoint. He understands the benefits of lifestyle modification in facilitating even better diabetes control. He has recognized that his weight loss has now plateaued and slightly increased. He does appreciate the advantage that he has with these medications. We discussed the importance of taking this opportunity to enhance lifestyle modification. He seems to be motivated in this regard at least to some degree  - Comprehensive Metabolic Panel; Future  - Hemoglobin A1C; Future  - Microalbumin / Creatinine Urine Ratio; Future  - T4, Free; Future  - TSH without Reflex; Future    2. Essential hypertension  Blood pressure is controlled although not quite at optimal levels. Lifestyle modification is likely the best way to accomplish this as well  - CBC Auto Differential; Future  - Comprehensive Metabolic Panel; Future  - Microalbumin / Creatinine Urine Ratio; Future    3. Mixed hyperlipidemia  Lipids are appropriately controlled on this medication regimen. Continue the same  - Lipid Panel; Future    4. Primary osteoarthritis of right knee  We did discuss the exercises as well as quadriceps strengthening exercises to provide structural stability for the knee. We will also inject his knee today as this has been helpful in the past    5. Primary osteoarthritis of left knee  We did discuss the exercises as well as quadriceps strengthening exercises to provide structural stability for the knee. We will also inject his knee today as this has been helpful in the past    6. ED (erectile dysfunction) of non-organic origin  Refill of sildenafil as before  - sildenafil (VIAGRA) 100 MG tablet; Take 1 tablet by mouth as needed for Erectile Dysfunction  Dispense: 30 tablet;  Refill: 3      Orders Placed This Encounter   Procedures    CBC Auto Differential    Comprehensive Metabolic Panel    Hemoglobin A1C    Lipid Panel    Microalbumin / Creatinine Urine Ratio    T4, Free    TSH without Reflex        Return in about 3 months (around 5/8/2022) for 30. Knee Arthrocentesis with Injection Procedure Note    Pre-operative Diagnosis: left knee djd with pain    Post-operative Diagnosis: same    Indications: Symptom relief from osteoarthritis    Anesthesia: not required     Procedure Details     Verbal consent was obtained for the procedure. The joint was prepped with chlorhexadine. The area was sprayed with Gebauer's Ethyl Chloride as a topical anesthetic and then a 22 gauge needle was inserted into the superior aspect of the joint from a lateral approach. 2 ml 1% lidocaine and 2 ml of DepoMedrol (40mg/ml) and 1ml Kenalog (40mg/ml)  was then injected into the joint. The needle was removed and the area cleansed and dressed. Complications:  None; patient tolerated the procedure well. Knee Arthrocentesis with Injection Procedure Note    Pre-operative Diagnosis: right knee djd with pain    Post-operative Diagnosis: same    Indications: Symptom relief from osteoarthritis    Anesthesia: not required     Procedure Details     Verbal consent was obtained for the procedure. The joint was prepped with chlorhexadine. The area was sprayed with Gebauer's Ethyl Chloride as a topical anesthetic and then a 22 gauge needle was inserted into the superior aspect of the joint from a lateral approach. 2 ml 1% lidocaine and 2 ml of DepoMedrol (40mg/ml) and 1ml Kenalog (40mg/ml)  was then injected into the joint. The needle was removed and the area cleansed and dressed. Complications:  None; patient tolerated the procedure well. This was an in-house visit.

## 2022-03-11 DIAGNOSIS — E11.9 TYPE 2 DIABETES MELLITUS WITHOUT COMPLICATION, WITHOUT LONG-TERM CURRENT USE OF INSULIN (HCC): ICD-10-CM

## 2022-03-11 RX ORDER — EMPAGLIFLOZIN 25 MG/1
1 TABLET, FILM COATED ORAL DAILY
Qty: 90 TABLET | Refills: 3 | Status: SHIPPED | OUTPATIENT
Start: 2022-03-11

## 2022-03-11 NOTE — TELEPHONE ENCOUNTER
Received call/My Chart Message from patient requesting refill on medication(s). Pt was last seen in office on 2/8/2022  and has a follow up scheduled for 5/10/2022. Will send request to provider for authorization.      Requested Prescriptions     Pending Prescriptions Disp Refills    empagliflozin (JARDIANCE) 25 MG tablet 90 tablet 3     Sig: Take 1 tablet by mouth daily

## 2022-05-10 ENCOUNTER — OFFICE VISIT (OUTPATIENT)
Dept: PRIMARY CARE CLINIC | Age: 47
End: 2022-05-10
Payer: COMMERCIAL

## 2022-05-10 VITALS
HEIGHT: 73 IN | DIASTOLIC BLOOD PRESSURE: 70 MMHG | HEART RATE: 67 BPM | TEMPERATURE: 97.8 F | SYSTOLIC BLOOD PRESSURE: 110 MMHG | OXYGEN SATURATION: 97 % | BODY MASS INDEX: 40.82 KG/M2 | WEIGHT: 308 LBS

## 2022-05-10 DIAGNOSIS — E78.2 MIXED HYPERLIPIDEMIA: ICD-10-CM

## 2022-05-10 DIAGNOSIS — M25.561 CHRONIC PAIN OF RIGHT KNEE: ICD-10-CM

## 2022-05-10 DIAGNOSIS — M17.11 PRIMARY OSTEOARTHRITIS OF RIGHT KNEE: ICD-10-CM

## 2022-05-10 DIAGNOSIS — E11.9 TYPE 2 DIABETES MELLITUS WITHOUT COMPLICATION, WITHOUT LONG-TERM CURRENT USE OF INSULIN (HCC): ICD-10-CM

## 2022-05-10 DIAGNOSIS — E11.9 TYPE 2 DIABETES MELLITUS WITHOUT COMPLICATION, WITHOUT LONG-TERM CURRENT USE OF INSULIN (HCC): Primary | ICD-10-CM

## 2022-05-10 DIAGNOSIS — I10 ESSENTIAL HYPERTENSION: ICD-10-CM

## 2022-05-10 DIAGNOSIS — G89.29 CHRONIC PAIN OF RIGHT KNEE: ICD-10-CM

## 2022-05-10 LAB
ALBUMIN SERPL-MCNC: 4.3 G/DL (ref 3.5–5.2)
ALP BLD-CCNC: 125 U/L (ref 40–130)
ALT SERPL-CCNC: 16 U/L (ref 5–41)
ANION GAP SERPL CALCULATED.3IONS-SCNC: 14 MMOL/L (ref 7–19)
AST SERPL-CCNC: 16 U/L (ref 5–40)
BASOPHILS ABSOLUTE: 0.1 K/UL (ref 0–0.2)
BASOPHILS RELATIVE PERCENT: 1.1 % (ref 0–1)
BILIRUB SERPL-MCNC: 0.6 MG/DL (ref 0.2–1.2)
BUN BLDV-MCNC: 14 MG/DL (ref 6–20)
CALCIUM SERPL-MCNC: 9.7 MG/DL (ref 8.6–10)
CHLORIDE BLD-SCNC: 103 MMOL/L (ref 98–111)
CHOLESTEROL, TOTAL: 120 MG/DL (ref 160–199)
CO2: 23 MMOL/L (ref 22–29)
CREAT SERPL-MCNC: 0.8 MG/DL (ref 0.5–1.2)
CREATININE URINE: 95.7 MG/DL (ref 4.2–622)
EOSINOPHILS ABSOLUTE: 0.2 K/UL (ref 0–0.6)
EOSINOPHILS RELATIVE PERCENT: 2.4 % (ref 0–5)
GFR AFRICAN AMERICAN: >59
GFR NON-AFRICAN AMERICAN: >60
GLUCOSE BLD-MCNC: 91 MG/DL (ref 74–109)
HBA1C MFR BLD: 5.9 % (ref 4–6)
HCT VFR BLD CALC: 51.5 % (ref 42–52)
HDLC SERPL-MCNC: 39 MG/DL (ref 55–121)
HEMOGLOBIN: 16.5 G/DL (ref 14–18)
IMMATURE GRANULOCYTES #: 0.1 K/UL
LDL CHOLESTEROL CALCULATED: 60 MG/DL
LYMPHOCYTES ABSOLUTE: 2.3 K/UL (ref 1.1–4.5)
LYMPHOCYTES RELATIVE PERCENT: 23.6 % (ref 20–40)
MCH RBC QN AUTO: 31.2 PG (ref 27–31)
MCHC RBC AUTO-ENTMCNC: 32 G/DL (ref 33–37)
MCV RBC AUTO: 97.4 FL (ref 80–94)
MICROALBUMIN UR-MCNC: <1.2 MG/DL (ref 0–19)
MICROALBUMIN/CREAT UR-RTO: NORMAL MG/G
MONOCYTES ABSOLUTE: 0.8 K/UL (ref 0–0.9)
MONOCYTES RELATIVE PERCENT: 8 % (ref 0–10)
NEUTROPHILS ABSOLUTE: 6.1 K/UL (ref 1.5–7.5)
NEUTROPHILS RELATIVE PERCENT: 64.1 % (ref 50–65)
PDW BLD-RTO: 13.5 % (ref 11.5–14.5)
PLATELET # BLD: 314 K/UL (ref 130–400)
PMV BLD AUTO: 9.8 FL (ref 9.4–12.4)
POTASSIUM SERPL-SCNC: 3.7 MMOL/L (ref 3.5–5)
RBC # BLD: 5.29 M/UL (ref 4.7–6.1)
SODIUM BLD-SCNC: 140 MMOL/L (ref 136–145)
T4 FREE: 1.29 NG/DL (ref 0.93–1.7)
TOTAL PROTEIN: 7.4 G/DL (ref 6.6–8.7)
TRIGL SERPL-MCNC: 107 MG/DL (ref 0–149)
TSH SERPL DL<=0.05 MIU/L-ACNC: 1.45 UIU/ML (ref 0.27–4.2)
WBC # BLD: 9.5 K/UL (ref 4.8–10.8)

## 2022-05-10 PROCEDURE — 20610 DRAIN/INJ JOINT/BURSA W/O US: CPT | Performed by: PEDIATRICS

## 2022-05-10 PROCEDURE — 99214 OFFICE O/P EST MOD 30 MIN: CPT | Performed by: PEDIATRICS

## 2022-05-10 RX ORDER — TRIAMCINOLONE ACETONIDE 40 MG/ML
40 INJECTION, SUSPENSION INTRA-ARTICULAR; INTRAMUSCULAR ONCE
Status: COMPLETED | OUTPATIENT
Start: 2022-05-10 | End: 2022-05-10

## 2022-05-10 RX ORDER — METHYLPREDNISOLONE ACETATE 80 MG/ML
80 INJECTION, SUSPENSION INTRA-ARTICULAR; INTRALESIONAL; INTRAMUSCULAR; SOFT TISSUE ONCE
Status: COMPLETED | OUTPATIENT
Start: 2022-05-10 | End: 2022-05-10

## 2022-05-10 RX ADMIN — METHYLPREDNISOLONE ACETATE 80 MG: 80 INJECTION, SUSPENSION INTRA-ARTICULAR; INTRALESIONAL; INTRAMUSCULAR; SOFT TISSUE at 10:52

## 2022-05-10 RX ADMIN — TRIAMCINOLONE ACETONIDE 40 MG: 40 INJECTION, SUSPENSION INTRA-ARTICULAR; INTRAMUSCULAR at 10:50

## 2022-05-10 SDOH — ECONOMIC STABILITY: FOOD INSECURITY: WITHIN THE PAST 12 MONTHS, YOU WORRIED THAT YOUR FOOD WOULD RUN OUT BEFORE YOU GOT MONEY TO BUY MORE.: NEVER TRUE

## 2022-05-10 SDOH — ECONOMIC STABILITY: FOOD INSECURITY: WITHIN THE PAST 12 MONTHS, THE FOOD YOU BOUGHT JUST DIDN'T LAST AND YOU DIDN'T HAVE MONEY TO GET MORE.: NEVER TRUE

## 2022-05-10 ASSESSMENT — ENCOUNTER SYMPTOMS
VOMITING: 0
SORE THROAT: 0
CHEST TIGHTNESS: 0
DIARRHEA: 0
COUGH: 0
SHORTNESS OF BREATH: 0
NAUSEA: 0
ABDOMINAL PAIN: 0
BACK PAIN: 0
WHEEZING: 0

## 2022-05-10 ASSESSMENT — SOCIAL DETERMINANTS OF HEALTH (SDOH): HOW HARD IS IT FOR YOU TO PAY FOR THE VERY BASICS LIKE FOOD, HOUSING, MEDICAL CARE, AND HEATING?: NOT HARD AT ALL

## 2022-05-10 NOTE — PROGRESS NOTES
Knee Arthrocentesis with Injection Procedure Note    Pre-operative Diagnosis: right knee djd with pain    Post-operative Diagnosis: same    Indications: Symptom relief from osteoarthritis    Anesthesia: not required     Procedure Details     Verbal consent was obtained for the procedure. The joint was prepped with chlorhexadine. The area was sprayed with Gebauer's Ethyl Chloride as a topical anesthetic and then a 22 gauge needle was inserted into the superior aspect of the joint from a lateral approach. 2 ml 1% lidocaine and 2 ml of DepoMedrol (40mg/ml) and 1ml Kenalog (40mg/ml)  was then injected into the joint. The needle was removed and the area cleansed and dressed. Complications:  None; patient tolerated the procedure well.

## 2022-05-10 NOTE — PATIENT INSTRUCTIONS
Knee Arthritis: Care Instructions  Your Care Instructions     Knee arthritis is a breakdown of the cartilage that cushions your knee joint. When the cartilage wears down, your bones rub against each other. This causespain and stiffness. Knee arthritis tends to get worse with time. Treatment for knee arthritis involves reducing pain, making the leg muscles stronger, and staying at a healthy body weight. The treatment usually does not improve the health of the cartilage, but it can reduce pain and improve howwell your knee works. You can take simple measures to protect your knee joints, ease your pain, andhelp you stay active. Follow-up care is a key part of your treatment and safety. Be sure to make and go to all appointments, and call your doctor if you are having problems. It's also a good idea to know your test results and keep alist of the medicines you take. How can you care for yourself at home?  Know that knee arthritis will cause more pain on some days than on others.  Stay at a healthy weight. Lose weight if you are overweight. When you stand up, the pressure on your knees from every pound of body weight is multiplied four times. So if you lose 10 pounds, you will reduce the pressure on your knees by 40 pounds.  Talk to your doctor or physical therapist about exercises that will help ease joint pain. ? Stretch to help prevent stiffness and to prevent injury before you exercise. You may enjoy gentle forms of yoga to help keep your knee joints and muscles flexible. ? Walk instead of jog.  ? Ride a bike. This makes your thigh muscles stronger and takes pressure off your knee. ? Wear well-fitting and comfortable shoes. ? Exercise in chest-deep water. This can help you exercise longer with less pain. ? Avoid exercises that include squatting or kneeling. They can put a lot of strain on your knees.   ? Talk to your doctor to make sure that the exercise you do is not making the arthritis worse.   Do not sit for long periods of time. Try to walk once in a while to keep your knee from getting stiff.  Ask your doctor or physical therapist whether shoe inserts may reduce your arthritis pain.  If you can afford it, get new athletic shoes at least every year. This can help reduce the strain on your knees.  Use a device to help you do everyday activities. ? A cane or walking stick can help you keep your balance when you walk. Hold the cane or walking stick in the hand opposite the painful knee. ? If you feel like you may fall when you walk, try using crutches or a front-wheeled walker. These can prevent falls that could cause more damage to your knee. ? A knee brace may help keep your knee stable and prevent pain. ? You also can use other things to make life easier, such as a higher toilet seat and handrails in the bathtub or shower.  Take pain medicines exactly as directed. ? Do not wait until you are in severe pain. You will get better results if you take it sooner. ? If you are not taking a prescription pain medicine, take an over-the-counter medicine such as acetaminophen (Tylenol), ibuprofen (Advil, Motrin), or naproxen (Aleve). Read and follow all instructions on the label. ? Do not take two or more pain medicines at the same time unless the doctor told you to. Many pain medicines have acetaminophen, which is Tylenol. Too much acetaminophen (Tylenol) can be harmful. ? Tell your doctor if you take a blood thinner, have diabetes, or have allergies to shellfish.  Ask your doctor if you might benefit from a shot of steroid medicine into your knee. This may provide pain relief for several months.  Many people take the supplements glucosamine and chondroitin for osteoarthritis. Some people feel they help, but the medical research does not show that they work. Talk to your doctor before you take these supplements. When should you call for help?    Call your doctor now or seek immediate medical care if:     You have sudden swelling, warmth, or pain in your knee.      You have knee pain and a fever or rash.      You have such bad pain that you cannot use your knee. Watch closely for changes in your health, and be sure to contact your doctor ifyou have any problems. Where can you learn more? Go to [email or web address].[email or web address] org and sign in to your [employer] account. Enter N279 in the [employer] box to learn more about \"Knee Arthritis: Care Instructions. \"     If you do not have an account, please click on the \"Sign Up Now\" link. Current as of: December 20, 2021               Content Version: 13.2  © 2006-2022 [employer]. Care instructions adapted under license by [facility] ([facility]). If you have questions about a medical condition or this instruction, always ask your healthcare professional. [address] any warranty or liability for your use of this information.

## 2022-05-10 NOTE — PROGRESS NOTES
[address] [facility], [address]  Phone [phone]   Fax [phone]      OFFICE VISIT: 5/10/2022    Royal KARIMI [place]-: 1975      HPI  Reason For Visit:  Royal Mcdermott is a 48 y.o.     3 Month Follow-Up, Knee Pain (right knee pain worse than normal, would like injection. ), Diabetes (BG good, no new concerns. ), and Mole (mole on right side of abdomin, wife noticed last week and would like looked at)    Patient presents on follow-up for knee pain. His left knee is doing better. His right knee is worse than normal.  He would like an injection in his right knee today. We have been doing serial injections in bilateral knees. Diabetes Mellitus Type 2  Diet compliance:  he is trying to be compliant  Nutrition Consultation Needed:  no  Medication:              Jardiance 25 mg daily              Dulaglutide 0.75 mg subcu weekly              Metformin 500 mg daily with meal  Medication compliance:  compliant most of the time  Weight trend: decreasing.  Weight is up 3 lb  Current exercise: yes -but limited due to his arthritic knee pain  Checking: occasionally  Home blood sugar records: fasting range: 85-90  Low BG:  no  Eye exam current (within one year): yes  Checking Feet regularly:  yes - no sores  ACE/ARB:  yes - lisinopril  Aspirin: No, but recommended. Tobacco history: He  reports that he quit smoking about 18 years ago. He has a 5.00 pack-year smoking history.  He has quit using smokeless tobacco.    Lab Results   Component Value Date    LABA1C 5.9 10/08/2021    LABA1C 6.0 2021    LABA1C 6.5 (H) 2021     Lab Results   Component Value Date    LABMICR <1.20 2021    CREATININE 0.8 10/08/2021       Hypertension:   BP today was   BP Readings from Last 1 Encounters:   05/10/22 110/70      Recent BP readings:    BP Readings from Last 3 Encounters:   05/10/22 110/70   22 130/84   10/08/21 130/84     Medication   Lisinoprilhydrochlorothiazide 20-12.5 mg twice daily              Amlodipine 5 mg daily               Medication compliance:  compliant most of the time  Home blood pressure monitoring: No.    He is not adherent to a low sodium diet. Symptoms: none  Laboratory:  Lab Results   Component Value Date    BUN 12 10/08/2021    CREATININE 0.8 10/08/2021       Hyperlipidemia:   Medication:   atorvastatin (Lipitor)  Low Fat, Low Choleterol Diet:  yes -to some degree  Myalgias or GI upset: no  The patient exercises intermittently. Laboratory:    Lab Results   Component Value Date    CHOL 111 (L) 07/08/2021    TRIG 124 07/08/2021    HDL 35 (L) 07/08/2021    LDLCALC 51 07/08/2021    LDLDIRECT 128 06/09/2015      Lab Results   Component Value Date    ALT 15 10/08/2021    AST 20 10/08/2021          height is 6' 1\" (1.854 m) and weight is 308 lb (139.7 kg) (abnormal). His temporal temperature is 97.8 °F (36.6 °C). His blood pressure is 110/70 and his pulse is 67. His oxygen saturation is 97%. Body mass index is 40.64 kg/m². I have reviewed the following with the Mr. Capo Caldera   Lab Review  No visits with results within 6 Month(s) from this visit. Latest known visit with results is:   Orders Only on 10/08/2021   Component Date Value    Testosterone 10/08/2021 349     Sex Hormone Binding 10/08/2021 25     Testosterone, Free 10/08/2021 81.3     Hemoglobin A1C 10/08/2021 5.9     Sodium 10/08/2021 140     Potassium 10/08/2021 3.9     Chloride 10/08/2021 100     CO2 10/08/2021 26     Anion Gap 10/08/2021 14     Glucose 10/08/2021 79     BUN 10/08/2021 12     CREATININE 10/08/2021 0.8     GFR Non- 10/08/2021 >60     GFR  10/08/2021 >59     Calcium 10/08/2021 9.7     Total Protein 10/08/2021 7.2     Albumin 10/08/2021 4.6     Total Bilirubin 10/08/2021 1.1     Alkaline Phosphatase 10/08/2021 149*    ALT 10/08/2021 15     AST 10/08/2021 20      Copies of these are in the chart.     Current Outpatient Medications   Medication Sig Dispense Refill    empagliflozin (JARDIANCE) 25 MG tablet Take 1 tablet by mouth daily 90 tablet 3    sildenafil (VIAGRA) 100 MG tablet Take 1 tablet by mouth as needed for Erectile Dysfunction 30 tablet 3    amLODIPine (NORVASC) 5 MG tablet Take 1 tablet by mouth daily 90 tablet 3    Dulaglutide 0.75 MG/0.5ML SOPN Inject 0.75 mg into the skin once a week 12 pen 3    atorvastatin (LIPITOR) 40 MG tablet Take 1 tablet by mouth nightly 90 tablet 3    meloxicam (MOBIC) 15 MG tablet Take 1 tablet by mouth daily 90 tablet 3    lisinopril-hydroCHLOROthiazide (PRINZIDE;ZESTORETIC) 20-25 MG per tablet Take 1 tablet by mouth daily 30 tablet 11    metFORMIN (GLUCOPHAGE) 500 MG tablet Take 1 tablet by mouth Daily with supper 90 tablet 3    Lancets (ONETOUCH DELICA PLUS [id number]) MISC Use to test blood sugar daily and prn 100 each 0    blood glucose monitor kit and supplies Dispense sufficient amount for indicated testing frequency plus additional to accommodate PRN testing needs. Dispense all needed supplies to include: monitor, strips, lancing device, lancets, control solutions, alcohol swabs. 1 kit 0    blood glucose monitor strips Test once a day & as needed for symptoms of irregular blood glucose. 100 strip 3    CPAP Machine MISC by Does not apply route nightly       No current facility-administered medications for this visit. Allergies: Patient has no known allergies. Past Medical History:   Diagnosis Date    ED (erectile dysfunction)     Fatigue     Hyperlipidemia     Hypertension     Obesity     KAREN on CPAP 7/8/2021    Osteoarthrosis     Snoring        No family history on file.     Past Surgical History:   Procedure Laterality Date    ARM SURGERY Left     broken     KNEE SURGERY      Scope     VASECTOMY         Social History     Tobacco Use    Smoking status: Former Smoker     Packs/day: 0.50     Years: 10.00     Pack years: 5.00     Quit date: 11/19/2003     Years since quitting: 18.4    Smokeless tobacco: Former User   Substance Use Topics    Alcohol use: No        Review of Systems   Constitutional: Positive for activity change ( He is trying to exercise more but limited by his knees). Negative for chills, fatigue and fever. HENT: Negative for congestion, ear pain and sore throat. Eyes: Negative for visual disturbance. Respiratory: Negative for cough, chest tightness, shortness of breath and wheezing. Uses CPAP regularly   Cardiovascular: Negative for chest pain, palpitations and leg swelling. Gastrointestinal: Negative for abdominal pain, diarrhea, nausea and vomiting. Endocrine: Negative for polyuria. Genitourinary: Negative for frequency and urgency. Musculoskeletal: Positive for arthralgias (knees are better after shots. ). Negative for back pain and neck pain. Skin: Negative for rash. Neurological: Negative for dizziness and headaches. Psychiatric/Behavioral: Negative for self-injury. The patient is not nervous/anxious. Physical Exam  Constitutional:       General: He is not in acute distress. Appearance: Normal appearance. He is well-developed. He is obese. HENT:      Head: Normocephalic and atraumatic. Right Ear: Tympanic membrane, ear canal and external ear normal.      Left Ear: Tympanic membrane, ear canal and external ear normal.      Nose: Nose normal.      Mouth/Throat:      Mouth: Mucous membranes are moist.      Pharynx: Oropharynx is clear. Eyes:      General: No scleral icterus. Extraocular Movements: Extraocular movements intact. Conjunctiva/sclera: Conjunctivae normal.      Pupils: Pupils are equal, round, and reactive to light. Neck:      Thyroid: No thyromegaly. Vascular: No carotid bruit or JVD. Cardiovascular:      Rate and Rhythm: Normal rate and regular rhythm. No extrasystoles are present. Chest Wall: PMI is not displaced. Pulses: Normal pulses.       Heart sounds: Normal heart sounds, S1 normal and S2 normal. No murmur heard. No friction rub. No gallop. Pulmonary:      Effort: Pulmonary effort is normal. No respiratory distress. Breath sounds: Normal breath sounds. No wheezing, rhonchi or rales. Abdominal:      General: Bowel sounds are normal.      Palpations: Abdomen is soft. Tenderness: There is no abdominal tenderness. There is no guarding or rebound. Genitourinary:     Comments: Exam deferred  Musculoskeletal:         General: No tenderness. Normal range of motion. Cervical back: Normal range of motion and neck supple. Comments: Crepitus in bilateral knees. Some occasional popping with movement   Lymphadenopathy:      Cervical: No cervical adenopathy. Skin:     General: Skin is warm and dry. Capillary Refill: Capillary refill takes less than 2 seconds. Findings: No rash. Neurological:      General: No focal deficit present. Mental Status: He is alert and oriented to person, place, and time. Sensory: No sensory deficit (no numbness or tingling). Psychiatric:         Mood and Affect: Mood normal.         Behavior: Behavior normal.         ASSESSMENT      ICD-10-CM    1. Type 2 diabetes mellitus without complication, without long-term current use of insulin (HCC)  E11.9    2. Essential hypertension  I10    3. Mixed hyperlipidemia  E78.2    4. Primary osteoarthritis of right knee  M17.11          PLAN    1. Type 2 diabetes mellitus without complication, without long-term current use of insulin ([facility] [address]  He is due for labs. This is controlled historically    2. Essential hypertension  This is controlled    3. Mixed hyperlipidemia  Recheck lipids     4. Primary osteoarthritis of right knee  Will inject today. No orders of the defined types were placed in this encounter. Return in about 3 months (around 8/10/2022) for 30. This was an in-house visit.       Knee Arthrocentesis with Injection Procedure Note    Pre-operative Diagnosis: right knee djd with pain    Post-operative Diagnosis: same    Indications: Symptom relief from osteoarthritis    Anesthesia: not required     Procedure Details     Verbal consent was obtained for the procedure. The joint was prepped with chlorhexadine. The area was sprayed with Gebauer's Ethyl Chloride as a topical anesthetic and then a 22 gauge needle was inserted into the superior aspect of the joint from a lateral approach. 2 ml 1% lidocaine and 2 ml of DepoMedrol (40mg/ml) and 1ml Kenalog (40mg/ml)  was then injected into the joint. The needle was removed and the area cleansed and dressed. Complications:  None; patient tolerated the procedure well.

## 2022-08-30 DIAGNOSIS — E11.9 TYPE 2 DIABETES MELLITUS WITHOUT COMPLICATION, WITHOUT LONG-TERM CURRENT USE OF INSULIN (HCC): ICD-10-CM

## 2022-08-30 NOTE — TELEPHONE ENCOUNTER
Received fax from pharmacy requesting refill on pts medication(s). Pt was last seen in office on 5/10/2022  and has a follow up scheduled for 9/9/2022. Will send request to  Alejandro Friedman  for patient.      Requested Prescriptions     Pending Prescriptions Disp Refills    metFORMIN (GLUCOPHAGE) 500 MG tablet 90 tablet 3     Sig: Take 1 tablet by mouth Daily with supper

## 2022-09-09 ENCOUNTER — OFFICE VISIT (OUTPATIENT)
Dept: PRIMARY CARE CLINIC | Age: 47
End: 2022-09-09
Payer: COMMERCIAL

## 2022-09-09 VITALS
DIASTOLIC BLOOD PRESSURE: 81 MMHG | BODY MASS INDEX: 41.69 KG/M2 | SYSTOLIC BLOOD PRESSURE: 122 MMHG | TEMPERATURE: 98 F | HEART RATE: 62 BPM | OXYGEN SATURATION: 96 % | WEIGHT: 315 LBS

## 2022-09-09 DIAGNOSIS — I10 ESSENTIAL HYPERTENSION: ICD-10-CM

## 2022-09-09 DIAGNOSIS — E78.2 MIXED HYPERLIPIDEMIA: ICD-10-CM

## 2022-09-09 DIAGNOSIS — E11.9 TYPE 2 DIABETES MELLITUS WITHOUT COMPLICATION, WITHOUT LONG-TERM CURRENT USE OF INSULIN (HCC): Primary | ICD-10-CM

## 2022-09-09 DIAGNOSIS — M17.11 PRIMARY OSTEOARTHRITIS OF RIGHT KNEE: ICD-10-CM

## 2022-09-09 DIAGNOSIS — M17.12 PRIMARY OSTEOARTHRITIS OF LEFT KNEE: ICD-10-CM

## 2022-09-09 DIAGNOSIS — E11.9 TYPE 2 DIABETES MELLITUS WITHOUT COMPLICATION, WITHOUT LONG-TERM CURRENT USE OF INSULIN (HCC): ICD-10-CM

## 2022-09-09 DIAGNOSIS — M15.9 PRIMARY OSTEOARTHRITIS INVOLVING MULTIPLE JOINTS: ICD-10-CM

## 2022-09-09 LAB
ALBUMIN SERPL-MCNC: 4.3 G/DL (ref 3.5–5.2)
ALP BLD-CCNC: 124 U/L (ref 40–130)
ALT SERPL-CCNC: 16 U/L (ref 5–41)
ANION GAP SERPL CALCULATED.3IONS-SCNC: 12 MMOL/L (ref 7–19)
AST SERPL-CCNC: 23 U/L (ref 5–40)
BILIRUB SERPL-MCNC: 0.6 MG/DL (ref 0.2–1.2)
BUN BLDV-MCNC: 14 MG/DL (ref 6–20)
CALCIUM SERPL-MCNC: 9.6 MG/DL (ref 8.6–10)
CHLORIDE BLD-SCNC: 104 MMOL/L (ref 98–111)
CO2: 25 MMOL/L (ref 22–29)
CREAT SERPL-MCNC: 0.8 MG/DL (ref 0.5–1.2)
GFR AFRICAN AMERICAN: >59
GFR NON-AFRICAN AMERICAN: >60
GLUCOSE BLD-MCNC: 81 MG/DL (ref 74–109)
HBA1C MFR BLD: 5.8 % (ref 4–6)
POTASSIUM SERPL-SCNC: 3.8 MMOL/L (ref 3.5–5)
SODIUM BLD-SCNC: 141 MMOL/L (ref 136–145)
TOTAL PROTEIN: 7.1 G/DL (ref 6.6–8.7)

## 2022-09-09 PROCEDURE — 20610 DRAIN/INJ JOINT/BURSA W/O US: CPT | Performed by: PEDIATRICS

## 2022-09-09 PROCEDURE — 99214 OFFICE O/P EST MOD 30 MIN: CPT | Performed by: PEDIATRICS

## 2022-09-09 PROCEDURE — 3044F HG A1C LEVEL LT 7.0%: CPT | Performed by: PEDIATRICS

## 2022-09-09 RX ORDER — TRIAMCINOLONE ACETONIDE 40 MG/ML
40 INJECTION, SUSPENSION INTRA-ARTICULAR; INTRAMUSCULAR ONCE
Status: COMPLETED | OUTPATIENT
Start: 2022-09-09 | End: 2022-09-09

## 2022-09-09 RX ADMIN — TRIAMCINOLONE ACETONIDE 40 MG: 40 INJECTION, SUSPENSION INTRA-ARTICULAR; INTRAMUSCULAR at 17:51

## 2022-09-09 ASSESSMENT — ENCOUNTER SYMPTOMS
COUGH: 0
NAUSEA: 0
VOMITING: 0
CHEST TIGHTNESS: 0
ABDOMINAL PAIN: 0
BACK PAIN: 0
SHORTNESS OF BREATH: 0
DIARRHEA: 0
WHEEZING: 0
SORE THROAT: 0

## 2022-09-09 NOTE — PROGRESS NOTES
to a low sodium diet. Symptoms: none  Laboratory:  Lab Results   Component Value Date    BUN 14 05/10/2022    CREATININE 0.8 05/10/2022       Hyperlipidemia:   Medication:   atorvastatin (Lipitor) 40 mg nightly  Low Fat, Low Choleterol Diet:  no  Myalgias or GI upset: no  The patient exercises intermittently. Laboratory:    Lab Results   Component Value Date    CHOL 120 (L) 05/10/2022    TRIG 107 05/10/2022    HDL 39 (L) 05/10/2022    LDLCALC 60 05/10/2022    LDLDIRECT 128 06/09/2015      Lab Results   Component Value Date    ALT 16 05/10/2022    AST 16 05/10/2022       Osteoarthritis:  Medication:   Meloxicam 15 mg daily  Symptoms:controlled other than his knees. Obstructive sleep apnea on CPAP  He is using his PAP device regularly. weight is 316 lb (143.3 kg) (abnormal). His temporal temperature is 98 °F (36.7 °C). His blood pressure is 122/81 and his pulse is 62. His oxygen saturation is 96%. Body mass index is 41.69 kg/m².     I have reviewed the following with the Mr. Brandon Lopez   Lab Review  Orders Only on 05/10/2022   Component Date Value    WBC 05/10/2022 9.5     RBC 05/10/2022 5.29     Hemoglobin 05/10/2022 16.5     Hematocrit 05/10/2022 51.5     MCV 05/10/2022 97.4 (A)    MCH 05/10/2022 31.2 (A)    MCHC 05/10/2022 32.0 (A)    RDW 05/10/2022 13.5     Platelets 47/50/0631 314     MPV 05/10/2022 9.8     Neutrophils % 05/10/2022 64.1     Lymphocytes % 05/10/2022 23.6     Monocytes % 05/10/2022 8.0     Eosinophils % 05/10/2022 2.4     Basophils % 05/10/2022 1.1 (A)    Neutrophils Absolute 05/10/2022 6.1     Immature Granulocytes # 05/10/2022 0.1     Lymphocytes Absolute 05/10/2022 2.3     Monocytes Absolute 05/10/2022 0.80     Eosinophils Absolute 05/10/2022 0.20     Basophils Absolute 05/10/2022 0.10     Sodium 05/10/2022 140     Potassium 05/10/2022 3.7     Chloride 05/10/2022 103     CO2 05/10/2022 23     Anion Gap 05/10/2022 14     Glucose 05/10/2022 91     BUN 05/10/2022 14     Creatinine 05/10/2022 0.8     GFR Non- 05/10/2022 >60     GFR  05/10/2022 >59     Calcium 05/10/2022 9.7     Total Protein 05/10/2022 7.4     Albumin 05/10/2022 4.3     Total Bilirubin 05/10/2022 0.6     Alkaline Phosphatase 05/10/2022 125     ALT 05/10/2022 16     AST 05/10/2022 16     Hemoglobin A1C 05/10/2022 5.9     Cholesterol, Total 05/10/2022 120 (A)    Triglycerides 05/10/2022 107     HDL 05/10/2022 39 (A)    LDL Calculated 05/10/2022 60     Microalbumin, Random Uri* 05/10/2022 <1.20     Creatinine, Ur 05/10/2022 95.7     Microalbumin Creatinine * 05/10/2022 see below     T4 Free 05/10/2022 1.29     TSH 05/10/2022 1.450      Copies of these are in the chart. Current Outpatient Medications   Medication Sig Dispense Refill    metFORMIN (GLUCOPHAGE) 500 MG tablet Take 1 tablet by mouth Daily with supper 90 tablet 3    empagliflozin (JARDIANCE) 25 MG tablet Take 1 tablet by mouth daily 90 tablet 3    sildenafil (VIAGRA) 100 MG tablet Take 1 tablet by mouth as needed for Erectile Dysfunction 30 tablet 3    amLODIPine (NORVASC) 5 MG tablet Take 1 tablet by mouth daily 90 tablet 3    Dulaglutide 0.75 MG/0.5ML SOPN Inject 0.75 mg into the skin once a week 12 pen 3    atorvastatin (LIPITOR) 40 MG tablet Take 1 tablet by mouth nightly 90 tablet 3    meloxicam (MOBIC) 15 MG tablet Take 1 tablet by mouth daily 90 tablet 3    lisinopril-hydroCHLOROthiazide (PRINZIDE;ZESTORETIC) 20-25 MG per tablet Take 1 tablet by mouth daily 30 tablet 11    Lancets (ONETOUCH DELICA PLUS [id number]) MISC Use to test blood sugar daily and prn 100 each 0    blood glucose monitor kit and supplies Dispense sufficient amount for indicated testing frequency plus additional to accommodate PRN testing needs. Dispense all needed supplies to include: monitor, strips, lancing device, lancets, control solutions, alcohol swabs.  1 kit 0    blood glucose monitor strips Test once a day & as needed for symptoms of irregular blood glucose. 100 strip 3    CPAP Machine MISC by Does not apply route nightly       Current Facility-Administered Medications   Medication Dose Route Frequency Provider Last Rate Last Admin    triamcinolone acetonide (KENALOG-40) injection 40 mg  40 mg Intra-artICUlar Once B Lily Copper, DO           Allergies: Patient has no known allergies. Past Medical History:   Diagnosis Date    ED (erectile dysfunction)     Fatigue     Hyperlipidemia     Hypertension     Obesity     KAREN on CPAP 2021    Osteoarthrosis     Snoring        No family history on file. Past Surgical History:   Procedure Laterality Date    ARM SURGERY Left     broken     KNEE SURGERY      Scope     VASECTOMY         Social History     Tobacco Use    Smoking status: Former     Packs/day: 0.50     Years: 10.00     Pack years: 5.00     Types: Cigarettes     Quit date: 2003     Years since quittin.8    Smokeless tobacco: Former   Substance Use Topics    Alcohol use: No        Review of Systems   Constitutional:  Positive for activity change ( He is trying to exercise more but limited by his knees). Negative for chills, fatigue and fever. HENT:  Negative for congestion, ear pain and sore throat. Eyes:  Negative for visual disturbance. Respiratory:  Negative for cough, chest tightness, shortness of breath and wheezing. Uses CPAP regularly   Cardiovascular:  Negative for chest pain, palpitations and leg swelling. Gastrointestinal:  Negative for abdominal pain, diarrhea, nausea and vomiting. Endocrine: Negative for polyuria. Genitourinary:  Negative for frequency and urgency. Musculoskeletal:  Positive for arthralgias (knees are better after shots. ). Negative for back pain and neck pain. Skin:  Negative for rash. Neurological:  Negative for dizziness and headaches. Psychiatric/Behavioral:  Negative for self-injury. The patient is not nervous/anxious.       Physical Exam  Constitutional:       General: He is not in acute distress. Appearance: Normal appearance. He is well-developed. He is obese. HENT:      Head: Normocephalic and atraumatic. Right Ear: Tympanic membrane, ear canal and external ear normal.      Left Ear: Tympanic membrane, ear canal and external ear normal.      Nose: Nose normal.      Mouth/Throat:      Mouth: Mucous membranes are moist.      Pharynx: Oropharynx is clear. Eyes:      General: No scleral icterus. Extraocular Movements: Extraocular movements intact. Conjunctiva/sclera: Conjunctivae normal.      Pupils: Pupils are equal, round, and reactive to light. Neck:      Thyroid: No thyromegaly. Vascular: No carotid bruit or JVD. Cardiovascular:      Rate and Rhythm: Normal rate and regular rhythm. No extrasystoles are present. Chest Wall: PMI is not displaced. Pulses: Normal pulses. Heart sounds: Normal heart sounds, S1 normal and S2 normal. No murmur heard. No friction rub. No gallop. Pulmonary:      Effort: Pulmonary effort is normal. No respiratory distress. Breath sounds: Normal breath sounds. No wheezing, rhonchi or rales. Abdominal:      General: Bowel sounds are normal.      Palpations: Abdomen is soft. Tenderness: There is no abdominal tenderness. There is no guarding or rebound. Genitourinary:     Comments: Exam deferred  Musculoskeletal:         General: No tenderness. Normal range of motion. Cervical back: Normal range of motion and neck supple. Comments: Crepitus in bilateral knees. Some occasional popping with movement   Lymphadenopathy:      Cervical: No cervical adenopathy. Skin:     General: Skin is warm and dry. Capillary Refill: Capillary refill takes less than 2 seconds. Findings: No rash. Neurological:      General: No focal deficit present. Mental Status: He is alert and oriented to person, place, and time. Sensory: No sensory deficit (no numbness or tingling).    Psychiatric: Mood and Affect: Mood normal.         Behavior: Behavior normal.       ASSESSMENT      ICD-10-CM    1. Type 2 diabetes mellitus without complication, without long-term current use of insulin ([facility])  E11.9 Comprehensive Metabolic Panel     Hemoglobin A1C      2. Essential hypertension  I10 Comprehensive Metabolic Panel      3. Mixed hyperlipidemia  E78.2       4. Primary osteoarthritis involving multiple joints  M89.49       5. Primary osteoarthritis of left knee  M17.12 triamcinolone acetonide (KENALOG-40) injection 40 mg     MA DRAIN/INJECT LARGE JOINT/BURSA      6. Primary osteoarthritis of right knee  M17.11 triamcinolone acetonide (KENALOG-40) injection 40 mg     MA DRAIN/INJECT LARGE JOINT/BURSA      7. BMI 40.0-44.9, adult ([facility] [address]  Z68.41             PLAN    1. Type 2 diabetes mellitus without complication, without long-term current use of insulin ([facility])  Blood sugars remain well controlled per  We will continue to follow labs. We will check hemoglobin A8Y and metabolic profile this time  - Comprehensive Metabolic Panel; Future  - Hemoglobin A1C; Future    2. Essential hypertension  Blood pressure is poorly controlled. Continue the same  - Comprehensive Metabolic Panel; Future    3. Mixed hyperlipidemia  Lipids have been excellently controlled. Continue present management. Recheck lipids next evaluation    4. Primary osteoarthritis involving multiple joints  Doing fairly well on meloxicam other than his knees    5. Primary osteoarthritis of left knee  Inject with triamcinolone  - triamcinolone acetonide (KENALOG-40) injection 40 mg  - MA DRAIN/INJECT LARGE JOINT/BURSA    6. Primary osteoarthritis of right knee  Inject with triamcinolone  - triamcinolone acetonide (KENALOG-40) injection 40 mg  - MA DRAIN/INJECT LARGE JOINT/BURSA    7.  BMI 40.0-44.9, adult ([address]  Discussed weight loss with diet and exercise    Orders Placed This Encounter   Procedures    Comprehensive Metabolic Panel    Hemoglobin A1C    MA DRAIN/INJECT LARGE JOINT/BURSA          Return in 3 months (on 12/9/2022). This was an in-house visit. Knee Arthrocentesis with Injection Procedure Note    Pre-operative Diagnosis: bilateral knee djd with pain    Post-operative Diagnosis: same    Indications: Symptom relief from osteoarthritis    Anesthesia: not required     Procedure Details     Verbal consent was obtained for the procedure. The joint was prepped with chlorhexadine. The area was sprayed with Gebauer's Ethyl Chloride as a topical anesthetic and then a 22 gauge needle was inserted into the superior aspect of the joint from a lateral approach. 2 ml 1% lidocaine and  2ml Kenalog (40mg/ml)  was then injected into the joint. The needle was removed and the area cleansed and dressed. Complications:  None; patient tolerated the procedure well.

## 2022-09-12 ENCOUNTER — TELEPHONE (OUTPATIENT)
Dept: PRIMARY CARE CLINIC | Age: 47
End: 2022-09-12

## 2022-09-12 DIAGNOSIS — I10 ESSENTIAL HYPERTENSION: ICD-10-CM

## 2022-09-12 RX ORDER — LISINOPRIL AND HYDROCHLOROTHIAZIDE 25; 20 MG/1; MG/1
1 TABLET ORAL DAILY
Qty: 30 TABLET | Refills: 0 | Status: SHIPPED | OUTPATIENT
Start: 2022-09-12 | End: 2022-10-13

## 2022-09-12 NOTE — TELEPHONE ENCOUNTER
Received fax from pharmacy requesting refill on pts medication(s). Pt was last seen in office on 9/9/2022  and has a follow up scheduled for 12/12/2022. Will send request to  Dr. Miki Castaneda  for authorization.      Requested Prescriptions     Pending Prescriptions Disp Refills    lisinopril-hydroCHLOROthiazide (PRINZIDE;ZESTORETIC) 20-25 MG per tablet [Pharmacy Med Name: LISINOPRIL-HCTZ 20-25 TABLE] 30 tablet 0     Sig: Take 1 tablet by mouth daily

## 2022-09-12 NOTE — TELEPHONE ENCOUNTER
Called patient, spoke with: Spouse regarding the results of the patients most recent labs. I advised Spouse of Dr. Zoran Sy recommendations.    Patient did voice understanding

## 2022-09-12 NOTE — TELEPHONE ENCOUNTER
----- Message from [address] [facility], DO sent at 9/12/2022  1:32 PM CDT -----  Your metabolic profile is normal.  This includes kidney and liver functions as well as electrolytes. Blood sugar was 81 at the time of the lab draw.   Hemoglobin A1c is 5.8 which indicates excellent blood sugar control over the past 3 months

## 2022-09-20 ENCOUNTER — PATIENT MESSAGE (OUTPATIENT)
Dept: PRIMARY CARE CLINIC | Age: 47
End: 2022-09-20

## 2022-09-20 DIAGNOSIS — Z99.89 OSA ON CPAP: Primary | ICD-10-CM

## 2022-09-20 DIAGNOSIS — G47.33 OSA ON CPAP: Primary | ICD-10-CM

## 2022-09-21 NOTE — TELEPHONE ENCOUNTER
Faxed through [facility] to [email or web address]

## 2022-09-21 NOTE — TELEPHONE ENCOUNTER
From: Simone Saini  To: Dr. Sandeep Hall: 9/20/2022 9:01 PM CDT  Subject: C-pap machine     I was contacted by [address] me that my c-pap machine was not recording my nightly numbers. They said that if the numbers weren't recorded, my insurance would not pay for any equipment. They said I need a new machine. Can you write a prescription for a new c-pap machine for me to [facility] in [place]?

## 2022-10-13 DIAGNOSIS — I10 ESSENTIAL HYPERTENSION: ICD-10-CM

## 2022-10-13 RX ORDER — LISINOPRIL AND HYDROCHLOROTHIAZIDE 25; 20 MG/1; MG/1
1 TABLET ORAL DAILY
Qty: 30 TABLET | Refills: 0 | Status: SHIPPED | OUTPATIENT
Start: 2022-10-13

## 2022-10-13 NOTE — TELEPHONE ENCOUNTER
Received fax from pharmacy requesting refill on pts medication(s). Pt was last seen in office on 9/9/2022  and has a follow up scheduled for 12/12/2022. Will send request to  Dr. Jermaine Patterson  for authorization.      Requested Prescriptions     Pending Prescriptions Disp Refills    lisinopril-hydroCHLOROthiazide (PRINZIDE;ZESTORETIC) 20-25 MG per tablet [Pharmacy Med Name: LISINOPRIL-HCTZ 20-25 TABLE] 30 tablet 0     Sig: Take 1 tablet by mouth daily

## 2022-11-29 DIAGNOSIS — I10 ESSENTIAL HYPERTENSION: ICD-10-CM

## 2022-11-29 RX ORDER — LISINOPRIL AND HYDROCHLOROTHIAZIDE 25; 20 MG/1; MG/1
1 TABLET ORAL DAILY
Qty: 90 TABLET | Refills: 3 | Status: SHIPPED | OUTPATIENT
Start: 2022-11-29

## 2022-11-29 NOTE — TELEPHONE ENCOUNTER
Received fax from pharmacy requesting refill on pts medication(s). Pt was last seen in office on 9/9/2022  and has a follow up scheduled for 12/12/2022. Will send request to  Dr. Janelle Melton  for patient.      Requested Prescriptions     Pending Prescriptions Disp Refills    lisinopril-hydroCHLOROthiazide (PRINZIDE;ZESTORETIC) 20-25 MG per tablet 90 tablet 3     Sig: Take 1 tablet by mouth daily

## 2023-01-09 DIAGNOSIS — E11.9 TYPE 2 DIABETES MELLITUS WITHOUT COMPLICATION, WITHOUT LONG-TERM CURRENT USE OF INSULIN (HCC): ICD-10-CM

## 2023-01-09 RX ORDER — DULAGLUTIDE 0.75 MG/.5ML
0.75 INJECTION, SOLUTION SUBCUTANEOUS WEEKLY
Qty: 2 ML | Refills: 0 | Status: SHIPPED | OUTPATIENT
Start: 2023-01-09

## 2023-01-09 NOTE — TELEPHONE ENCOUNTER
Received fax from pharmacy requesting refill on pts medication(s). Pt was last seen in office on 9/9/2022  and has a follow up scheduled for 1/30/2023. Will send request to  Ganesh Dodge  for authorization. Requested Prescriptions     Pending Prescriptions Disp Refills    [facility] 9.81 IV/2.2XT SOPN [facility] NU/5.2 ML PEN] 2 mL 0     Sig: INJECT . 75MG SUBCUTANEOUSLY ONCE WEEKLY

## 2023-01-16 DIAGNOSIS — Z00.00 VISIT FOR PREVENTIVE HEALTH EXAMINATION: ICD-10-CM

## 2023-01-16 DIAGNOSIS — M17.11 PRIMARY OSTEOARTHRITIS OF RIGHT KNEE: ICD-10-CM

## 2023-01-16 DIAGNOSIS — I10 ESSENTIAL HYPERTENSION: ICD-10-CM

## 2023-01-16 DIAGNOSIS — M17.12 PRIMARY OSTEOARTHRITIS OF LEFT KNEE: ICD-10-CM

## 2023-01-16 RX ORDER — AMLODIPINE BESYLATE 5 MG/1
5 TABLET ORAL DAILY
Qty: 30 TABLET | Refills: 0 | Status: SHIPPED | OUTPATIENT
Start: 2023-01-16

## 2023-01-16 RX ORDER — MELOXICAM 15 MG/1
15 TABLET ORAL DAILY
Qty: 30 TABLET | Refills: 0 | Status: SHIPPED | OUTPATIENT
Start: 2023-01-16

## 2023-01-16 NOTE — TELEPHONE ENCOUNTER
Received fax from pharmacy requesting refill on pts medication(s). Pt was last seen in office on 9/9/2022  and has a follow up scheduled for 1/16/2023. Will send request to  Dr. Joaquin Pete  for authorization.      Requested Prescriptions     Pending Prescriptions Disp Refills    meloxicam (MOBIC) 15 MG tablet [Pharmacy Med Name: MELOXICAM 15 MG TABLET] 30 tablet 0     Sig: Take 1 tablet by mouth daily

## 2023-01-16 NOTE — TELEPHONE ENCOUNTER
Received fax from pharmacy requesting refill on pts medication(s). Pt was last seen in office on 9/9/2022  and has a follow up scheduled for Visit date not found. Will send request to  Dr. Brown  for authorization.     Requested Prescriptions     Pending Prescriptions Disp Refills    amLODIPine (NORVASC) 5 MG tablet [Pharmacy Med Name: AMLODIPINE BESYLATE 5 MG TAB] 30 tablet 0     Sig: Take 1 tablet by mouth daily

## 2023-01-27 DIAGNOSIS — E78.2 MIXED HYPERLIPIDEMIA: ICD-10-CM

## 2023-01-27 RX ORDER — ATORVASTATIN CALCIUM 40 MG/1
40 TABLET, FILM COATED ORAL DAILY
Qty: 30 TABLET | Refills: 0 | Status: SHIPPED | OUTPATIENT
Start: 2023-01-27

## 2023-01-27 NOTE — TELEPHONE ENCOUNTER
Received fax from pharmacy requesting refill on pts medication(s). Pt was last seen in office on 9/9/2022  and has a follow up scheduled for Visit date not found. Will send request to  Sukhwinder French  for authorization. Requested Prescriptions     Pending Prescriptions Disp Refills    atorvastatin (LIPITOR) 40 MG tablet [Pharmacy Med Name: ATORVASTATIN 40 MG TABLET] 30 tablet 0     Sig: TAKE ONE TABLET BY MOUTH AT BEDTIME.

## 2023-02-02 DIAGNOSIS — E11.9 TYPE 2 DIABETES MELLITUS WITHOUT COMPLICATION, WITHOUT LONG-TERM CURRENT USE OF INSULIN (HCC): ICD-10-CM

## 2023-02-02 RX ORDER — DULAGLUTIDE 0.75 MG/.5ML
INJECTION, SOLUTION SUBCUTANEOUS
Qty: 2 ML | Refills: 0 | Status: SHIPPED | OUTPATIENT
Start: 2023-02-02

## 2023-02-06 DIAGNOSIS — I10 ESSENTIAL HYPERTENSION: ICD-10-CM

## 2023-02-06 DIAGNOSIS — Z00.00 VISIT FOR PREVENTIVE HEALTH EXAMINATION: ICD-10-CM

## 2023-02-06 DIAGNOSIS — M17.11 PRIMARY OSTEOARTHRITIS OF RIGHT KNEE: ICD-10-CM

## 2023-02-06 DIAGNOSIS — M17.12 PRIMARY OSTEOARTHRITIS OF LEFT KNEE: ICD-10-CM

## 2023-02-06 RX ORDER — AMLODIPINE BESYLATE 5 MG/1
TABLET ORAL
Qty: 30 TABLET | Refills: 11 | Status: SHIPPED | OUTPATIENT
Start: 2023-02-06 | End: 2023-03-07 | Stop reason: SDUPTHER

## 2023-02-06 RX ORDER — MELOXICAM 15 MG/1
TABLET ORAL
Qty: 30 TABLET | Refills: 11 | Status: SHIPPED | OUTPATIENT
Start: 2023-02-06 | End: 2023-03-07 | Stop reason: SDUPTHER

## 2023-02-28 DIAGNOSIS — E11.9 TYPE 2 DIABETES MELLITUS WITHOUT COMPLICATION, WITHOUT LONG-TERM CURRENT USE OF INSULIN (HCC): ICD-10-CM

## 2023-02-28 DIAGNOSIS — E78.2 MIXED HYPERLIPIDEMIA: ICD-10-CM

## 2023-02-28 RX ORDER — DULAGLUTIDE 0.75 MG/.5ML
0.75 INJECTION, SOLUTION SUBCUTANEOUS WEEKLY
Qty: 2 ML | Refills: 0 | Status: SHIPPED | OUTPATIENT
Start: 2023-02-28

## 2023-02-28 RX ORDER — ATORVASTATIN CALCIUM 40 MG/1
40 TABLET, FILM COATED ORAL DAILY
Qty: 30 TABLET | Refills: 0 | Status: SHIPPED | OUTPATIENT
Start: 2023-02-28

## 2023-02-28 NOTE — TELEPHONE ENCOUNTER
Received fax from pharmacy requesting refill on pts medication(s). Pt was last seen in office on Visit date not found  and has a follow up scheduled for 2/28/2023. Will send request to  Dr. Luzmaria Betts  for authorization. Requested Prescriptions     Pending Prescriptions Disp Refills    atorvastatin (LIPITOR) 40 MG tablet [Pharmacy Med Name: ATORVASTATIN 40 MG TABLET] 30 tablet 0     Sig: TAKE ONE TABLET BY MOUTH AT BEDTIME.

## 2023-03-07 ENCOUNTER — OFFICE VISIT (OUTPATIENT)
Dept: FAMILY MEDICINE CLINIC | Age: 48
End: 2023-03-07

## 2023-03-07 VITALS
SYSTOLIC BLOOD PRESSURE: 118 MMHG | HEIGHT: 73 IN | BODY MASS INDEX: 41.75 KG/M2 | TEMPERATURE: 99.1 F | HEART RATE: 70 BPM | OXYGEN SATURATION: 98 % | DIASTOLIC BLOOD PRESSURE: 78 MMHG | WEIGHT: 315 LBS

## 2023-03-07 DIAGNOSIS — Z00.00 VISIT FOR PREVENTIVE HEALTH EXAMINATION: ICD-10-CM

## 2023-03-07 DIAGNOSIS — M17.12 PRIMARY OSTEOARTHRITIS OF LEFT KNEE: ICD-10-CM

## 2023-03-07 DIAGNOSIS — E11.9 TYPE 2 DIABETES MELLITUS WITHOUT COMPLICATION, WITHOUT LONG-TERM CURRENT USE OF INSULIN (HCC): Primary | ICD-10-CM

## 2023-03-07 DIAGNOSIS — M17.11 PRIMARY OSTEOARTHRITIS OF RIGHT KNEE: ICD-10-CM

## 2023-03-07 DIAGNOSIS — R53.83 FATIGUE, UNSPECIFIED TYPE: ICD-10-CM

## 2023-03-07 DIAGNOSIS — I10 ESSENTIAL HYPERTENSION: ICD-10-CM

## 2023-03-07 DIAGNOSIS — E78.2 MIXED HYPERLIPIDEMIA: ICD-10-CM

## 2023-03-07 DIAGNOSIS — G47.33 OSA ON CPAP: ICD-10-CM

## 2023-03-07 DIAGNOSIS — Z99.89 OSA ON CPAP: ICD-10-CM

## 2023-03-07 RX ORDER — MELOXICAM 15 MG/1
15 TABLET ORAL DAILY
Qty: 90 TABLET | Refills: 3 | Status: SHIPPED | OUTPATIENT
Start: 2023-03-07

## 2023-03-07 RX ORDER — AMLODIPINE BESYLATE 5 MG/1
5 TABLET ORAL DAILY
Qty: 90 TABLET | Refills: 3 | Status: SHIPPED | OUTPATIENT
Start: 2023-03-07

## 2023-03-07 RX ORDER — ATORVASTATIN CALCIUM 40 MG/1
40 TABLET, FILM COATED ORAL DAILY
Qty: 90 TABLET | Refills: 3 | Status: SHIPPED | OUTPATIENT
Start: 2023-03-07

## 2023-03-07 RX ORDER — DULAGLUTIDE 0.75 MG/.5ML
0.75 INJECTION, SOLUTION SUBCUTANEOUS WEEKLY
Qty: 12 ADJUSTABLE DOSE PRE-FILLED PEN SYRINGE | Refills: 3 | Status: SHIPPED | OUTPATIENT
Start: 2023-03-07

## 2023-03-07 ASSESSMENT — ENCOUNTER SYMPTOMS
SHORTNESS OF BREATH: 0
DIARRHEA: 0
CHEST TIGHTNESS: 0
NAUSEA: 0
WHEEZING: 0
BACK PAIN: 0
SORE THROAT: 0
ABDOMINAL PAIN: 0
COUGH: 0
VOMITING: 0

## 2023-03-07 NOTE — PROGRESS NOTES
[facility] [address] [place], [address]  Phone [phone]   Fax [phone]      OFFICE VISIT: 3/7/2023    Isidoro KARIMI Leroy-: 1975      HPI  Reason For Visit:  Isidoro Giron is a 50 y.o.     3 Month Follow-Up (Bg are staying controlled, no new concerns. )        Diabetes Mellitus Type 2  Diet compliance:  he is trying to be compliant  Nutrition Consultation Needed:  no  Medication:              Jardiance 25 mg daily              Dulaglutide 0.75 mg subcu weekly              Metformin 500 mg daily with meal  Medication compliance:  compliant most of the time  Weight trend: decreasing. Weight is up 1 lb  Current exercise: yes -but limited due to his arthritic knee pain  Checking: occasionally  Home blood sugar records: fasting range: controlled when he checks  Low BG:  no  Eye exam current (within one year): yes  Checking Feet regularly:  yes - no sores  ACE/ARB:  yes - lisinopril  Tobacco history: He  reports that he quit smoking about 19 years ago. His smoking use included cigarettes. He has a 5.00 pack-year smoking history. He has quit using smokeless tobacco.    Lab Results   Component Value Date    LABA1C 5.8 2022    LABA1C 5.9 05/10/2022    LABA1C 5.9 10/08/2021     Lab Results   Component Value Date    LABMICR <1.20 05/10/2022    CREATININE 0.8 2022       Hypertension:   BP today was   BP Readings from Last 1 Encounters:   23 118/78      Recent BP readings:    BP Readings from Last 3 Encounters:   23 118/78   22 122/81   05/10/22 110/70     Medication              Lisinopril-hydrochlorothiazide 20-12.5 mg twice daily              Amlodipine 5 mg daily              Medication compliance:  compliant most of the time  Home blood pressure monitoring: No.    He  is somewhat  adherent to a low sodium diet.      Symptoms: none  Laboratory:  Lab Results   Component Value Date    BUN 14 2022    CREATININE 0.8 2022       Hyperlipidemia: Medication:   atorvastatin (Lipitor) 40 mg nightly  Low Fat, Low Choleterol Diet: some  Myalgias or GI upset: no  The patient exercises intermittently. Laboratory:    Lab Results   Component Value Date    CHOL 120 (L) 05/10/2022    TRIG 107 05/10/2022    HDL 39 (L) 05/10/2022    LDLCALC 60 05/10/2022    LDLDIRECT 128 06/09/2015      Lab Results   Component Value Date    ALT 16 09/09/2022    AST 23 09/09/2022       Osteoarthritis:  Medication:              Meloxicam 15 mg daily  Symptoms:controlled other than his knees. Obstructive sleep apnea on CPAP  He is using his PAP device regularly. height is 6' 1\" (1.854 m) and weight is 317 lb (143.8 kg) (abnormal). His temporal temperature is 99.1 °F (37.3 °C). His blood pressure is 118/78 and his pulse is 70. His oxygen saturation is 98%. Body mass index is 41.82 kg/m². I have reviewed the following with the Mr. Marco Dillon   Lab Review  Orders Only on 09/09/2022   Component Date Value    Hemoglobin A1C 09/09/2022 5.8     Sodium 09/09/2022 141     Potassium 09/09/2022 3.8     Chloride 09/09/2022 104     CO2 09/09/2022 25     Anion Gap 09/09/2022 12     Glucose 09/09/2022 81     BUN 09/09/2022 14     Creatinine 09/09/2022 0.8     GFR Non- 09/09/2022 >60     GFR  09/09/2022 >59     Calcium 09/09/2022 9.6     Total Protein 09/09/2022 7.1     Albumin 09/09/2022 4.3     Total Bilirubin 09/09/2022 0.6     Alkaline Phosphatase 09/09/2022 124     ALT 09/09/2022 16     AST 09/09/2022 23      Copies of these are in the chart.     Current Outpatient Medications   Medication Sig Dispense Refill    Dulaglutide (TRULICITY) 9.36 VK/2.0UJ SOPN Inject 0.75 mg into the skin once a week 12 Adjustable Dose Pre-filled Pen Syringe 3    atorvastatin (LIPITOR) 40 MG tablet Take 1 tablet by mouth daily 90 tablet 3    amLODIPine (NORVASC) 5 MG tablet Take 1 tablet by mouth daily 90 tablet 3    meloxicam (MOBIC) 15 MG tablet Take 1 tablet by mouth daily 90 tablet 3    empagliflozin (JARDIANCE) 25 MG tablet Take 1 tablet by mouth daily 90 tablet 3    lisinopril-hydroCHLOROthiazide (PRINZIDE;ZESTORETIC) 20-25 MG per tablet Take 1 tablet by mouth daily 90 tablet 3    metFORMIN (GLUCOPHAGE) 500 MG tablet Take 1 tablet by mouth Daily with supper 90 tablet 3    sildenafil (VIAGRA) 100 MG tablet Take 1 tablet by mouth as needed for Erectile Dysfunction 30 tablet 3    Lancets (ONETOUCH DELICA PLUS [id number]) MISC Use to test blood sugar daily and prn 100 each 0    blood glucose monitor kit and supplies Dispense sufficient amount for indicated testing frequency plus additional to accommodate PRN testing needs. Dispense all needed supplies to include: monitor, strips, lancing device, lancets, control solutions, alcohol swabs. 1 kit 0    CPAP Machine MISC by Does not apply route nightly       No current facility-administered medications for this visit. Allergies: Patient has no known allergies. Past Medical History:   Diagnosis Date    ED (erectile dysfunction)     Fatigue     Hyperlipidemia     Hypertension     Obesity     KAREN on CPAP 2021    Osteoarthrosis     Snoring        No family history on file. Past Surgical History:   Procedure Laterality Date    ARM SURGERY Left     broken     KNEE SURGERY      Scope     VASECTOMY         Social History     Tobacco Use    Smoking status: Former     Packs/day: 0.50     Years: 10.00     Pack years: 5.00     Types: Cigarettes     Quit date: 2003     Years since quittin.3    Smokeless tobacco: Former   Substance Use Topics    Alcohol use: No        Review of Systems   Constitutional:  Positive for activity change ( He is trying to exercise more but limited by his knees). Negative for chills, fatigue and fever. HENT:  Negative for congestion, ear pain and sore throat. Eyes:  Negative for visual disturbance. Respiratory:  Negative for cough, chest tightness, shortness of breath and wheezing. Uses CPAP regularly   Cardiovascular:  Negative for chest pain, palpitations and leg swelling. Gastrointestinal:  Negative for abdominal pain, diarrhea, nausea and vomiting. Endocrine: Negative for polyuria. Genitourinary:  Negative for frequency and urgency. Musculoskeletal:  Positive for arthralgias (knees are better after shots. ). Negative for back pain and neck pain. Skin:  Negative for rash. Neurological:  Negative for dizziness and headaches. Psychiatric/Behavioral:  Negative for self-injury. The patient is not nervous/anxious. Physical Exam  Constitutional:       General: He is not in acute distress. Appearance: Normal appearance. He is well-developed. He is obese. HENT:      Head: Normocephalic and atraumatic. Right Ear: Tympanic membrane, ear canal and external ear normal.      Left Ear: Tympanic membrane, ear canal and external ear normal.      Nose: Nose normal.      Mouth/Throat:      Mouth: Mucous membranes are moist.      Pharynx: Oropharynx is clear. Eyes:      General: No scleral icterus. Extraocular Movements: Extraocular movements intact. Conjunctiva/sclera: Conjunctivae normal.      Pupils: Pupils are equal, round, and reactive to light. Neck:      Thyroid: No thyromegaly. Vascular: No carotid bruit or JVD. Cardiovascular:      Rate and Rhythm: Normal rate and regular rhythm. No extrasystoles are present. Chest Wall: PMI is not displaced. Pulses: Normal pulses. Heart sounds: Normal heart sounds, S1 normal and S2 normal. No murmur heard. No friction rub. No gallop. Pulmonary:      Effort: Pulmonary effort is normal. No respiratory distress. Breath sounds: Normal breath sounds. No wheezing, rhonchi or rales. Abdominal:      General: Bowel sounds are normal.      Palpations: Abdomen is soft. Tenderness: There is no abdominal tenderness. There is no guarding or rebound.    Genitourinary:     Comments: Exam deferred  Musculoskeletal:         General: No tenderness. Normal range of motion. Cervical back: Normal range of motion and neck supple. Comments: Crepitus in bilateral knees. Some occasional popping with movement   Lymphadenopathy:      Cervical: No cervical adenopathy. Skin:     General: Skin is warm and dry. Capillary Refill: Capillary refill takes less than 2 seconds. Findings: No rash. Neurological:      General: No focal deficit present. Mental Status: He is alert and oriented to person, place, and time. Sensory: No sensory deficit (no numbness or tingling). Psychiatric:         Mood and Affect: Mood normal.         Behavior: Behavior normal.         ASSESSMENT      ICD-10-CM    1. Type 2 diabetes mellitus without complication, without long-term current use of insulin (HCC)  E11.9 Comprehensive Metabolic Panel     Microalbumin / Creatinine Urine Ratio     Hemoglobin A1C     Dulaglutide (TRULICITY) 6.58 JJ/0.4VB SOPN     empagliflozin (JARDIANCE) 25 MG tablet      2. Essential hypertension  I10 CBC with Auto Differential     Comprehensive Metabolic Panel     Microalbumin / Creatinine Urine Ratio     amLODIPine (NORVASC) 5 MG tablet      3. Mixed hyperlipidemia  E78.2 Comprehensive Metabolic Panel     Lipid Panel     atorvastatin (LIPITOR) 40 MG tablet      4. KAREN on CPAP  G47.33     Z99.89       5. Fatigue, unspecified type  R53.83 T4, Free     TSH      6. Primary osteoarthritis of left knee  M17.12 meloxicam (MOBIC) 15 MG tablet      7. Primary osteoarthritis of right knee  M17.11 meloxicam (MOBIC) 15 MG tablet      8. Visit for preventive health examination  Z00.00 meloxicam (MOBIC) 15 MG tablet            PLAN    1. Type 2 diabetes mellitus without complication, without long-term current use of insulin (HCC)  Well controlled on this regimen. Will keep this the same and check labs. - Comprehensive Metabolic Panel;  Future  - Microalbumin / Creatinine Urine Ratio; Future  - Hemoglobin A1C; Future  - Dulaglutide (TRULICITY) 6.63 PB/2.5LG SOPN; Inject 0.75 mg into the skin once a week  Dispense: 12 Adjustable Dose Pre-filled Pen Syringe; Refill: 3  - empagliflozin (JARDIANCE) 25 MG tablet; Take 1 tablet by mouth daily  Dispense: 90 tablet; Refill: 3    2. Essential hypertension  The current medical regimen is effective;  continue present plan and medications. - CBC with Auto Differential; Future  - Comprehensive Metabolic Panel; Future  - Microalbumin / Creatinine Urine Ratio; Future  - amLODIPine (NORVASC) 5 MG tablet; Take 1 tablet by mouth daily  Dispense: 90 tablet; Refill: 3    3. Mixed hyperlipidemia  Follow lipids on therapy  - Comprehensive Metabolic Panel; Future  - Lipid Panel; Future  - atorvastatin (LIPITOR) 40 MG tablet; Take 1 tablet by mouth daily  Dispense: 90 tablet; Refill: 3    4. KAREN on CPAP  Using cpap regularly    5. Fatigue, unspecified type  Check thyroid  - T4, Free; Future  - TSH; Future    6. Primary osteoarthritis of left knee  Controlled fairly well  - meloxicam (MOBIC) 15 MG tablet; Take 1 tablet by mouth daily  Dispense: 90 tablet; Refill: 3    7. Primary osteoarthritis of right knee  Doing ok on this regimen  - meloxicam (MOBIC) 15 MG tablet; Take 1 tablet by mouth daily  Dispense: 90 tablet; Refill: 3      Orders Placed This Encounter   Procedures    CBC with Auto Differential    Comprehensive Metabolic Panel    Lipid Panel    Microalbumin / Creatinine Urine Ratio    T4, Free    TSH    Hemoglobin A1C          Return in about 3 months (around 6/7/2023) for 30. This was an in-house visit.

## 2023-03-10 ASSESSMENT — PATIENT HEALTH QUESTIONNAIRE - PHQ9
SUM OF ALL RESPONSES TO PHQ9 QUESTIONS 1 & 2: 0
1. LITTLE INTEREST OR PLEASURE IN DOING THINGS: 0
SUM OF ALL RESPONSES TO PHQ QUESTIONS 1-9: 0
SUM OF ALL RESPONSES TO PHQ QUESTIONS 1-9: 0
2. FEELING DOWN, DEPRESSED OR HOPELESS: 0
SUM OF ALL RESPONSES TO PHQ QUESTIONS 1-9: 0
SUM OF ALL RESPONSES TO PHQ QUESTIONS 1-9: 0

## 2023-08-01 DIAGNOSIS — Z00.00 VISIT FOR PREVENTIVE HEALTH EXAMINATION: ICD-10-CM

## 2023-08-01 DIAGNOSIS — M17.11 PRIMARY OSTEOARTHRITIS OF RIGHT KNEE: ICD-10-CM

## 2023-08-01 DIAGNOSIS — M17.12 PRIMARY OSTEOARTHRITIS OF LEFT KNEE: ICD-10-CM

## 2023-08-01 RX ORDER — MELOXICAM 15 MG/1
15 TABLET ORAL DAILY
Qty: 90 TABLET | Refills: 3 | Status: SHIPPED | OUTPATIENT
Start: 2023-08-01

## 2023-08-01 NOTE — TELEPHONE ENCOUNTER
Received fax from pharmacy requesting refill on pts medication(s). Pt was last seen in office on 3/7/2023  and has a follow up scheduled for 9/11/2023. Will send request to  Dr. Raymond Dominguez  for patient.      Requested Prescriptions     Pending Prescriptions Disp Refills    meloxicam (MOBIC) 15 MG tablet [Pharmacy Med Name: Meloxicam 15 MG Oral Tablet] 90 tablet 3     Sig: Take 1 tablet by mouth once daily

## 2023-08-01 NOTE — TELEPHONE ENCOUNTER
Sent rx to pharmacy for pt    Requested Prescriptions     Signed Prescriptions Disp Refills    meloxicam (MOBIC) 15 MG tablet 90 tablet 3     Sig: Take 1 tablet by mouth daily     Authorizing Provider: Ranjit Gordillo     Ordering User: Trenia Model

## 2023-08-08 DIAGNOSIS — E11.9 TYPE 2 DIABETES MELLITUS WITHOUT COMPLICATION, WITHOUT LONG-TERM CURRENT USE OF INSULIN (HCC): ICD-10-CM

## 2023-08-08 NOTE — TELEPHONE ENCOUNTER
Received fax from pharmacy requesting refill on pts medication(s). Pt was last seen in office on 3/7/2023  and has a follow up scheduled for 9/11/2023. Will send request to  Dr. Ramone Mendieta  for authorization.      Requested Prescriptions     Pending Prescriptions Disp Refills    metFORMIN (GLUCOPHAGE) 500 MG tablet [Pharmacy Med Name: metFORMIN HCl 500 MG Oral Tablet] 90 tablet 3     Sig: Take 1 tablet by mouth Daily with supper

## 2023-09-10 SDOH — ECONOMIC STABILITY: FOOD INSECURITY: WITHIN THE PAST 12 MONTHS, YOU WORRIED THAT YOUR FOOD WOULD RUN OUT BEFORE YOU GOT MONEY TO BUY MORE.: NEVER TRUE

## 2023-09-10 SDOH — ECONOMIC STABILITY: HOUSING INSECURITY
IN THE LAST 12 MONTHS, WAS THERE A TIME WHEN YOU DID NOT HAVE A STEADY PLACE TO SLEEP OR SLEPT IN A SHELTER (INCLUDING NOW)?: NO

## 2023-09-10 SDOH — ECONOMIC STABILITY: INCOME INSECURITY: HOW HARD IS IT FOR YOU TO PAY FOR THE VERY BASICS LIKE FOOD, HOUSING, MEDICAL CARE, AND HEATING?: NOT HARD AT ALL

## 2023-09-10 SDOH — ECONOMIC STABILITY: FOOD INSECURITY: WITHIN THE PAST 12 MONTHS, THE FOOD YOU BOUGHT JUST DIDN'T LAST AND YOU DIDN'T HAVE MONEY TO GET MORE.: NEVER TRUE

## 2023-09-11 ENCOUNTER — OFFICE VISIT (OUTPATIENT)
Dept: FAMILY MEDICINE CLINIC | Age: 48
End: 2023-09-11
Payer: COMMERCIAL

## 2023-09-11 VITALS
OXYGEN SATURATION: 97 % | BODY MASS INDEX: 40.95 KG/M2 | HEIGHT: 73 IN | SYSTOLIC BLOOD PRESSURE: 122 MMHG | WEIGHT: 309 LBS | TEMPERATURE: 97.9 F | HEART RATE: 69 BPM | DIASTOLIC BLOOD PRESSURE: 82 MMHG

## 2023-09-11 DIAGNOSIS — G89.29 CHRONIC PAIN OF RIGHT KNEE: ICD-10-CM

## 2023-09-11 DIAGNOSIS — E78.2 MIXED HYPERLIPIDEMIA: ICD-10-CM

## 2023-09-11 DIAGNOSIS — Z12.11 SCREEN FOR COLON CANCER: ICD-10-CM

## 2023-09-11 DIAGNOSIS — Z99.89 OBSTRUCTIVE SLEEP APNEA ON CPAP: ICD-10-CM

## 2023-09-11 DIAGNOSIS — I10 PRIMARY HYPERTENSION: ICD-10-CM

## 2023-09-11 DIAGNOSIS — Z12.5 SCREENING FOR PROSTATE CANCER: ICD-10-CM

## 2023-09-11 DIAGNOSIS — E11.9 TYPE 2 DIABETES MELLITUS WITHOUT COMPLICATION, WITHOUT LONG-TERM CURRENT USE OF INSULIN (HCC): Primary | ICD-10-CM

## 2023-09-11 DIAGNOSIS — G47.33 OBSTRUCTIVE SLEEP APNEA ON CPAP: ICD-10-CM

## 2023-09-11 DIAGNOSIS — M15.9 PRIMARY OSTEOARTHRITIS INVOLVING MULTIPLE JOINTS: ICD-10-CM

## 2023-09-11 DIAGNOSIS — R53.83 FATIGUE, UNSPECIFIED TYPE: ICD-10-CM

## 2023-09-11 DIAGNOSIS — M25.561 CHRONIC PAIN OF RIGHT KNEE: ICD-10-CM

## 2023-09-11 DIAGNOSIS — M17.11 PRIMARY OSTEOARTHRITIS OF RIGHT KNEE: ICD-10-CM

## 2023-09-11 DIAGNOSIS — E11.9 TYPE 2 DIABETES MELLITUS WITHOUT COMPLICATION, WITHOUT LONG-TERM CURRENT USE OF INSULIN (HCC): ICD-10-CM

## 2023-09-11 PROBLEM — M15.0 PRIMARY OSTEOARTHRITIS INVOLVING MULTIPLE JOINTS: Status: ACTIVE | Noted: 2023-09-11

## 2023-09-11 LAB
ALBUMIN SERPL-MCNC: 4.3 G/DL (ref 3.5–5.2)
ALP SERPL-CCNC: 125 U/L (ref 40–130)
ALT SERPL-CCNC: 32 U/L (ref 5–41)
ANION GAP SERPL CALCULATED.3IONS-SCNC: 15 MMOL/L (ref 7–19)
AST SERPL-CCNC: 36 U/L (ref 5–40)
BASOPHILS # BLD: 0.1 K/UL (ref 0–0.2)
BASOPHILS NFR BLD: 0.5 % (ref 0–1)
BILIRUB SERPL-MCNC: 0.4 MG/DL (ref 0.2–1.2)
BUN SERPL-MCNC: 11 MG/DL (ref 6–20)
CALCIUM SERPL-MCNC: 9.6 MG/DL (ref 8.6–10)
CHLORIDE SERPL-SCNC: 104 MMOL/L (ref 98–111)
CHOLEST SERPL-MCNC: 119 MG/DL (ref 160–199)
CO2 SERPL-SCNC: 23 MMOL/L (ref 22–29)
CREAT SERPL-MCNC: 0.8 MG/DL (ref 0.5–1.2)
CREAT UR-MCNC: 125.4 MG/DL (ref 39–259)
EOSINOPHIL # BLD: 0.4 K/UL (ref 0–0.6)
EOSINOPHIL NFR BLD: 3.8 % (ref 0–5)
ERYTHROCYTE [DISTWIDTH] IN BLOOD BY AUTOMATED COUNT: 13.4 % (ref 11.5–14.5)
GLUCOSE SERPL-MCNC: 123 MG/DL (ref 74–109)
HBA1C MFR BLD: 5.7 % (ref 4–6)
HCT VFR BLD AUTO: 47.1 % (ref 42–52)
HDLC SERPL-MCNC: 31 MG/DL (ref 55–121)
HGB BLD-MCNC: 15.4 G/DL (ref 14–18)
IMM GRANULOCYTES # BLD: 0.1 K/UL
LDLC SERPL CALC-MCNC: 62 MG/DL
LYMPHOCYTES # BLD: 1.4 K/UL (ref 1.1–4.5)
LYMPHOCYTES NFR BLD: 12.9 % (ref 20–40)
MCH RBC QN AUTO: 31.8 PG (ref 27–31)
MCHC RBC AUTO-ENTMCNC: 32.7 G/DL (ref 33–37)
MCV RBC AUTO: 97.3 FL (ref 80–94)
MICROALBUMIN UR-MCNC: 2.4 MG/DL (ref 0–19)
MICROALBUMIN/CREAT UR-RTO: 19.1 MG/G
MONOCYTES # BLD: 0.9 K/UL (ref 0–0.9)
MONOCYTES NFR BLD: 7.9 % (ref 0–10)
NEUTROPHILS # BLD: 8.1 K/UL (ref 1.5–7.5)
NEUTS SEG NFR BLD: 74.4 % (ref 50–65)
PLATELET # BLD AUTO: 326 K/UL (ref 130–400)
PMV BLD AUTO: 10 FL (ref 9.4–12.4)
POTASSIUM SERPL-SCNC: 3.8 MMOL/L (ref 3.5–5)
PROT SERPL-MCNC: 7.3 G/DL (ref 6.6–8.7)
PSA SERPL-MCNC: 0.42 NG/ML (ref 0–4)
RBC # BLD AUTO: 4.84 M/UL (ref 4.7–6.1)
SODIUM SERPL-SCNC: 142 MMOL/L (ref 136–145)
T4 FREE SERPL-MCNC: 1.26 NG/DL (ref 0.93–1.7)
TESTOST SERPL-MCNC: 344.4 NG/DL (ref 193–740)
TRIGL SERPL-MCNC: 129 MG/DL (ref 0–149)
TSH SERPL DL<=0.005 MIU/L-ACNC: 1.05 UIU/ML (ref 0.27–4.2)
WBC # BLD AUTO: 10.9 K/UL (ref 4.8–10.8)

## 2023-09-11 PROCEDURE — 20610 DRAIN/INJ JOINT/BURSA W/O US: CPT | Performed by: PEDIATRICS

## 2023-09-11 PROCEDURE — 3079F DIAST BP 80-89 MM HG: CPT | Performed by: PEDIATRICS

## 2023-09-11 PROCEDURE — 3074F SYST BP LT 130 MM HG: CPT | Performed by: PEDIATRICS

## 2023-09-11 PROCEDURE — 99214 OFFICE O/P EST MOD 30 MIN: CPT | Performed by: PEDIATRICS

## 2023-09-11 RX ORDER — TRIAMCINOLONE ACETONIDE 40 MG/ML
40 INJECTION, SUSPENSION INTRA-ARTICULAR; INTRAMUSCULAR ONCE
Status: COMPLETED | OUTPATIENT
Start: 2023-09-11 | End: 2023-09-11

## 2023-09-11 RX ORDER — DULAGLUTIDE 1.5 MG/.5ML
1.5 INJECTION, SOLUTION SUBCUTANEOUS WEEKLY
Qty: 12 ADJUSTABLE DOSE PRE-FILLED PEN SYRINGE | Refills: 3 | Status: SHIPPED | OUTPATIENT
Start: 2023-09-11

## 2023-09-11 RX ADMIN — TRIAMCINOLONE ACETONIDE 40 MG: 40 INJECTION, SUSPENSION INTRA-ARTICULAR; INTRAMUSCULAR at 08:59

## 2023-09-11 ASSESSMENT — ENCOUNTER SYMPTOMS
BACK PAIN: 0
WHEEZING: 0
COUGH: 0
VOMITING: 0
CHEST TIGHTNESS: 0
SORE THROAT: 0
NAUSEA: 0
DIARRHEA: 0
SHORTNESS OF BREATH: 0
ABDOMINAL PAIN: 0

## 2023-09-11 NOTE — PROGRESS NOTES
gallop. Pulmonary:      Effort: Pulmonary effort is normal. No respiratory distress. Breath sounds: Normal breath sounds. No wheezing, rhonchi or rales. Abdominal:      General: Bowel sounds are normal.      Palpations: Abdomen is soft. Tenderness: There is no abdominal tenderness. There is no guarding or rebound. Genitourinary:     Comments: Exam deferred  Musculoskeletal:         General: No tenderness. Normal range of motion. Cervical back: Normal range of motion and neck supple. Comments: Crepitus in bilateral knees. Some occasional popping with movement   Lymphadenopathy:      Cervical: No cervical adenopathy. Skin:     General: Skin is warm and dry. Capillary Refill: Capillary refill takes less than 2 seconds. Findings: No rash. Neurological:      General: No focal deficit present. Mental Status: He is alert and oriented to person, place, and time. Sensory: No sensory deficit (no numbness or tingling). Psychiatric:         Mood and Affect: Mood normal.         Behavior: Behavior normal.         ASSESSMENT      ICD-10-CM    1. Type 2 diabetes mellitus without complication, without long-term current use of insulin ([facility])  E11.9 Comprehensive Metabolic Panel     Hemoglobin A1C     Microalbumin / Creatinine Urine Ratio     T4, Free     TSH     dulaglutide (TRULICITY) 1.5 CW/4.6EL SC injection      2. Primary hypertension  I10 CBC with Auto Differential     Comprehensive Metabolic Panel     Microalbumin / Creatinine Urine Ratio      3. Mixed hyperlipidemia  E78.2 Comprehensive Metabolic Panel     Lipid Panel      4. Primary osteoarthritis involving multiple joints  M15.9       5. Obstructive sleep apnea on CPAP  G47.33     Z99.89       6. Screening for prostate cancer  Z12.5 PSA Screening      7. Screen for colon cancer  Z12.11 Fecal DNA Colorectal cancer screening (Cologuard)      8. Fatigue, unspecified type  R53.83 Testosterone, free, total      9.  Chronic pain

## 2023-09-12 ENCOUNTER — TELEPHONE (OUTPATIENT)
Dept: FAMILY MEDICINE CLINIC | Age: 48
End: 2023-09-12

## 2023-09-12 NOTE — TELEPHONE ENCOUNTER
----- Message from [email or web address] sent at 9/11/2023  7:43 PM CDT -----  Your metabolic profile is normal.  This includes kidney and liver functions as well as electrolytes. Blood sugar is 123 at the time of the lab draw. Lipids are excellently controlled. HDL cholesterol is low, this is the good cholesterol. We want this to be as high as possible. Exercise will increase this good cholesterol value. Your WBC, (infection fighting ability) Hgb and Hct, (oxygen carrying cells) are normal; as is your percentage of each cell type. No significant protein excretion in the urine.   Hemoglobin A1c is 5.7 which indicates excellent blood sugar control over the past 3 months  Thyroid is normal.  Testosterone is normal.  PSA is normal

## 2023-09-12 NOTE — TELEPHONE ENCOUNTER
Called patient, spoke with: Patient regarding the results of the patients most recent labs. I advised Patient of Dr. Killian Cotton recommendations.    Patient did voice understanding

## 2023-09-22 LAB — NONINV COLON CA DNA+OCC BLD SCRN STL QL: NEGATIVE

## 2023-09-25 ENCOUNTER — TELEPHONE (OUTPATIENT)
Dept: FAMILY MEDICINE CLINIC | Age: 48
End: 2023-09-25

## 2023-09-25 NOTE — TELEPHONE ENCOUNTER
----- Message from [employer] sent at 9/22/2023  5:52 PM CDT -----  Cologuard is negative, meaning normal.  Repeat study in 3 years

## 2023-09-27 DIAGNOSIS — E11.9 TYPE 2 DIABETES MELLITUS WITHOUT COMPLICATION, WITHOUT LONG-TERM CURRENT USE OF INSULIN (HCC): ICD-10-CM

## 2023-09-28 NOTE — TELEPHONE ENCOUNTER
Received fax from pharmacy requesting refill on pts medication(s). Pt was last seen in office on 9/11/2023  and has a follow up scheduled for 1/15/2024. Will send request to  Dr. Kendrick Sarah  for patient.      Requested Prescriptions     Pending Prescriptions Disp Refills    metFORMIN (GLUCOPHAGE) 500 MG tablet 90 tablet 3     Sig: Take 1 tablet by mouth Daily with supper

## 2023-09-28 NOTE — TELEPHONE ENCOUNTER
Sent rx to pharmacy for pt    Requested Prescriptions     Signed Prescriptions Disp Refills    metFORMIN (GLUCOPHAGE) 500 MG tablet 90 tablet 3     Sig: Take 1 tablet by mouth Daily with supper     Authorizing Provider: Yuly Harrington     Ordering User: Gin Mackay

## 2023-11-06 DIAGNOSIS — I10 ESSENTIAL HYPERTENSION: ICD-10-CM

## 2023-11-06 RX ORDER — LISINOPRIL AND HYDROCHLOROTHIAZIDE 25; 20 MG/1; MG/1
1 TABLET ORAL DAILY
Qty: 90 TABLET | Refills: 3 | Status: SHIPPED | OUTPATIENT
Start: 2023-11-06

## 2023-11-06 NOTE — TELEPHONE ENCOUNTER
Received fax from pharmacy requesting refill on pts medication(s). Pt was last seen in office on 9/11/2023  and has a follow up scheduled for 1/15/2024. Will send request to  Dr. Gatito Evans  for patient.      Requested Prescriptions     Pending Prescriptions Disp Refills    lisinopril-hydroCHLOROthiazide (PRINZIDE;ZESTORETIC) 20-25 MG per tablet 90 tablet 3     Sig: Take 1 tablet by mouth daily

## 2023-11-17 DIAGNOSIS — I10 ESSENTIAL HYPERTENSION: ICD-10-CM

## 2023-11-17 RX ORDER — LISINOPRIL AND HYDROCHLOROTHIAZIDE 25; 20 MG/1; MG/1
1 TABLET ORAL DAILY
Qty: 90 TABLET | Refills: 0 | Status: SHIPPED | OUTPATIENT
Start: 2023-11-17

## 2023-11-17 NOTE — TELEPHONE ENCOUNTER
Received fax from pharmacy requesting refill on pts medication(s). Pt was last seen in office on 9/11/2023  and has a follow up scheduled for 1/15/2024. Will send request to  Dr. Versie Scheuermann  for authorization.      Requested Prescriptions     Pending Prescriptions Disp Refills    lisinopril-hydroCHLOROthiazide (PRINZIDE;ZESTORETIC) 20-25 MG per tablet [Pharmacy Med Name: Lisinopril-hydroCHLOROthiazide 20-25 MG Oral Tablet] 90 tablet 0     Sig: Take 1 tablet by mouth once daily

## 2024-01-22 ASSESSMENT — PATIENT HEALTH QUESTIONNAIRE - PHQ9
2. FEELING DOWN, DEPRESSED OR HOPELESS: 0
1. LITTLE INTEREST OR PLEASURE IN DOING THINGS: NOT AT ALL
SUM OF ALL RESPONSES TO PHQ QUESTIONS 1-9: 0
1. LITTLE INTEREST OR PLEASURE IN DOING THINGS: 0
SUM OF ALL RESPONSES TO PHQ9 QUESTIONS 1 & 2: 0
SUM OF ALL RESPONSES TO PHQ9 QUESTIONS 1 & 2: 0
2. FEELING DOWN, DEPRESSED OR HOPELESS: NOT AT ALL
SUM OF ALL RESPONSES TO PHQ QUESTIONS 1-9: 0

## 2024-01-23 ENCOUNTER — OFFICE VISIT (OUTPATIENT)
Dept: FAMILY MEDICINE CLINIC | Age: 49
End: 2024-01-23
Payer: COMMERCIAL

## 2024-01-23 VITALS
HEIGHT: 73 IN | WEIGHT: 310.5 LBS | TEMPERATURE: 97.3 F | OXYGEN SATURATION: 98 % | DIASTOLIC BLOOD PRESSURE: 72 MMHG | SYSTOLIC BLOOD PRESSURE: 120 MMHG | BODY MASS INDEX: 41.15 KG/M2 | HEART RATE: 88 BPM

## 2024-01-23 DIAGNOSIS — I10 PRIMARY HYPERTENSION: Primary | ICD-10-CM

## 2024-01-23 DIAGNOSIS — E11.9 TYPE 2 DIABETES MELLITUS WITHOUT COMPLICATION, WITHOUT LONG-TERM CURRENT USE OF INSULIN (HCC): ICD-10-CM

## 2024-01-23 DIAGNOSIS — M15.9 PRIMARY OSTEOARTHRITIS INVOLVING MULTIPLE JOINTS: ICD-10-CM

## 2024-01-23 DIAGNOSIS — I10 PRIMARY HYPERTENSION: ICD-10-CM

## 2024-01-23 DIAGNOSIS — E78.2 MIXED HYPERLIPIDEMIA: ICD-10-CM

## 2024-01-23 DIAGNOSIS — G47.33 OBSTRUCTIVE SLEEP APNEA ON CPAP: ICD-10-CM

## 2024-01-23 LAB
ALBUMIN SERPL-MCNC: 4.5 G/DL (ref 3.5–5.2)
ALP SERPL-CCNC: 137 U/L (ref 40–130)
ALT SERPL-CCNC: 43 U/L (ref 5–41)
ANION GAP SERPL CALCULATED.3IONS-SCNC: 16 MMOL/L (ref 7–19)
AST SERPL-CCNC: 41 U/L (ref 5–40)
BASOPHILS # BLD: 0.1 K/UL (ref 0–0.2)
BASOPHILS NFR BLD: 0.8 % (ref 0–1)
BILIRUB SERPL-MCNC: 0.7 MG/DL (ref 0.2–1.2)
BUN SERPL-MCNC: 12 MG/DL (ref 6–20)
CALCIUM SERPL-MCNC: 9.9 MG/DL (ref 8.6–10)
CHLORIDE SERPL-SCNC: 102 MMOL/L (ref 98–111)
CHOLEST SERPL-MCNC: 125 MG/DL (ref 160–199)
CO2 SERPL-SCNC: 24 MMOL/L (ref 22–29)
CREAT SERPL-MCNC: 0.8 MG/DL (ref 0.5–1.2)
CREAT UR-MCNC: 151 MG/DL (ref 39–259)
EOSINOPHIL # BLD: 0.5 K/UL (ref 0–0.6)
EOSINOPHIL NFR BLD: 5.2 % (ref 0–5)
ERYTHROCYTE [DISTWIDTH] IN BLOOD BY AUTOMATED COUNT: 13.6 % (ref 11.5–14.5)
GLUCOSE SERPL-MCNC: 95 MG/DL (ref 74–109)
HBA1C MFR BLD: 5.8 % (ref 4–6)
HCT VFR BLD AUTO: 49.6 % (ref 42–52)
HDLC SERPL-MCNC: 40 MG/DL (ref 55–121)
HGB BLD-MCNC: 16.2 G/DL (ref 14–18)
IMM GRANULOCYTES # BLD: 0.1 K/UL
LDLC SERPL CALC-MCNC: 58 MG/DL
LYMPHOCYTES # BLD: 2 K/UL (ref 1.1–4.5)
LYMPHOCYTES NFR BLD: 19.9 % (ref 20–40)
MCH RBC QN AUTO: 31.2 PG (ref 27–31)
MCHC RBC AUTO-ENTMCNC: 32.7 G/DL (ref 33–37)
MCV RBC AUTO: 95.4 FL (ref 80–94)
MICROALBUMIN UR-MCNC: <1.2 MG/DL (ref 0–19)
MICROALBUMIN/CREAT UR-RTO: NORMAL MG/G
MONOCYTES # BLD: 0.8 K/UL (ref 0–0.9)
MONOCYTES NFR BLD: 7.4 % (ref 0–10)
NEUTROPHILS # BLD: 6.7 K/UL (ref 1.5–7.5)
NEUTS SEG NFR BLD: 66 % (ref 50–65)
PLATELET # BLD AUTO: 316 K/UL (ref 130–400)
PMV BLD AUTO: 9.6 FL (ref 9.4–12.4)
POTASSIUM SERPL-SCNC: 3.6 MMOL/L (ref 3.5–5)
PROT SERPL-MCNC: 7.6 G/DL (ref 6.6–8.7)
RBC # BLD AUTO: 5.2 M/UL (ref 4.7–6.1)
SODIUM SERPL-SCNC: 142 MMOL/L (ref 136–145)
T4 FREE SERPL-MCNC: 1.11 NG/DL (ref 0.93–1.7)
TRIGL SERPL-MCNC: 135 MG/DL (ref 0–149)
TSH SERPL DL<=0.005 MIU/L-ACNC: 1.35 UIU/ML (ref 0.27–4.2)
WBC # BLD AUTO: 10.2 K/UL (ref 4.8–10.8)

## 2024-01-23 PROCEDURE — 99214 OFFICE O/P EST MOD 30 MIN: CPT | Performed by: PEDIATRICS

## 2024-01-23 PROCEDURE — 3078F DIAST BP <80 MM HG: CPT | Performed by: PEDIATRICS

## 2024-01-23 PROCEDURE — 3074F SYST BP LT 130 MM HG: CPT | Performed by: PEDIATRICS

## 2024-01-23 RX ORDER — TIRZEPATIDE 7.5 MG/.5ML
7.5 INJECTION, SOLUTION SUBCUTANEOUS WEEKLY
Qty: 12 ADJUSTABLE DOSE PRE-FILLED PEN SYRINGE | Refills: 3 | Status: SHIPPED | OUTPATIENT
Start: 2024-01-23 | End: 2024-01-26 | Stop reason: SDUPTHER

## 2024-01-23 RX ORDER — ATORVASTATIN CALCIUM 40 MG/1
40 TABLET, FILM COATED ORAL DAILY
Qty: 90 TABLET | Refills: 3 | Status: SHIPPED | OUTPATIENT
Start: 2024-01-23

## 2024-01-23 RX ORDER — LISINOPRIL AND HYDROCHLOROTHIAZIDE 25; 20 MG/1; MG/1
1 TABLET ORAL DAILY
Qty: 90 TABLET | Refills: 3 | Status: SHIPPED | OUTPATIENT
Start: 2024-01-23

## 2024-01-23 RX ORDER — AMLODIPINE BESYLATE 5 MG/1
5 TABLET ORAL DAILY
Qty: 90 TABLET | Refills: 3 | Status: SHIPPED | OUTPATIENT
Start: 2024-01-23

## 2024-01-23 SDOH — ECONOMIC STABILITY: INCOME INSECURITY: HOW HARD IS IT FOR YOU TO PAY FOR THE VERY BASICS LIKE FOOD, HOUSING, MEDICAL CARE, AND HEATING?: NOT HARD AT ALL

## 2024-01-23 SDOH — ECONOMIC STABILITY: FOOD INSECURITY: WITHIN THE PAST 12 MONTHS, YOU WORRIED THAT YOUR FOOD WOULD RUN OUT BEFORE YOU GOT MONEY TO BUY MORE.: NEVER TRUE

## 2024-01-23 SDOH — ECONOMIC STABILITY: FOOD INSECURITY: WITHIN THE PAST 12 MONTHS, THE FOOD YOU BOUGHT JUST DIDN'T LAST AND YOU DIDN'T HAVE MONEY TO GET MORE.: NEVER TRUE

## 2024-01-23 ASSESSMENT — ENCOUNTER SYMPTOMS
BACK PAIN: 0
WHEEZING: 0
SHORTNESS OF BREATH: 0
COUGH: 0
SORE THROAT: 0
CHEST TIGHTNESS: 0
ABDOMINAL PAIN: 0
NAUSEA: 0
DIARRHEA: 0
VOMITING: 0

## 2024-01-23 NOTE — PROGRESS NOTES
Microalbumin / Creatinine Urine Ratio; Future  - T4, Free; Future  - TSH; Future  - empagliflozin (JARDIANCE) 25 MG tablet; Take 1 tablet by mouth daily  Dispense: 90 tablet; Refill: 3  - Tirzepatide (MOUNJARO) 7.5 MG/0.5ML SOPN SC injection; Inject 0.5 mLs into the skin once a week  Dispense: 12 Adjustable Dose Pre-filled Pen Syringe; Refill: 3    2. Primary hypertension  This is controlled  - CBC with Auto Differential; Future  - Comprehensive Metabolic Panel; Future  - Microalbumin / Creatinine Urine Ratio; Future  - lisinopril-hydroCHLOROthiazide (PRINZIDE;ZESTORETIC) 20-25 MG per tablet; Take 1 tablet by mouth daily  Dispense: 90 tablet; Refill: 3  - amLODIPine (NORVASC) 5 MG tablet; Take 1 tablet by mouth daily  Dispense: 90 tablet; Refill: 3    3. Mixed hyperlipidemia  Will continue this regimen  - Comprehensive Metabolic Panel; Future  - Lipid Panel; Future  - atorvastatin (LIPITOR) 40 MG tablet; Take 1 tablet by mouth daily  Dispense: 90 tablet; Refill: 3    4. BMI 40.0-44.9, adult (HCC)  His is gaining weight.    5. Primary osteoarthritis involving multiple joints  This is fairly well controlled.  - CBC with Auto Differential; Future  - Comprehensive Metabolic Panel; Future    6. Obstructive sleep apnea on CPAP  This is controlled on cpap.  - CBC with Auto Differential; Future  - Comprehensive Metabolic Panel; Future      Orders Placed This Encounter   Procedures    CBC with Auto Differential    Hemoglobin A1C    Comprehensive Metabolic Panel    Lipid Panel    Microalbumin / Creatinine Urine Ratio    T4, Free    TSH        Return in about 4 months (around 5/23/2024) for 30.       This was an in-house visit

## 2024-01-24 ENCOUNTER — TELEPHONE (OUTPATIENT)
Dept: FAMILY MEDICINE CLINIC | Age: 49
End: 2024-01-24

## 2024-01-24 NOTE — TELEPHONE ENCOUNTER
----- Message from WINNIE Brown DO sent at 1/23/2024  7:08 PM CST -----  Lipids are excellently controlled.  Your metabolic profile is normal.  This includes kidney and liver functions as well as electrolytes.  Your WBC, (infection fighting ability) Hgb and Hct, (oxygen carrying cells) are normal; as is your percentage of each cell type.  Slightly elevated allergy marker is noted  There is no significant protein excretion in the urine.  Thyroid is normal.  Hemoglobin A1c is 5.8 which indicates excellent blood sugar control over the past 3 months.

## 2024-01-24 NOTE — TELEPHONE ENCOUNTER
Called patient, spoke with: Patient regarding the results of the patients most recent labs.  I advised Patient of Dr. Brown recommendations.   Patient did voice understanding

## 2024-01-26 DIAGNOSIS — E11.9 TYPE 2 DIABETES MELLITUS WITHOUT COMPLICATION, WITHOUT LONG-TERM CURRENT USE OF INSULIN (HCC): ICD-10-CM

## 2024-01-26 RX ORDER — TIRZEPATIDE 7.5 MG/.5ML
7.5 INJECTION, SOLUTION SUBCUTANEOUS WEEKLY
Qty: 12 ADJUSTABLE DOSE PRE-FILLED PEN SYRINGE | Refills: 3 | Status: SHIPPED | OUTPATIENT
Start: 2024-01-26

## 2024-01-26 NOTE — TELEPHONE ENCOUNTER
Received call/My Chart Message from patient requesting refill on medication(s). Pt was last seen in office on 1/23/2024  and has a follow up scheduled for 5/21/2024. Will send request to provider for authorization.     Requested Prescriptions     Pending Prescriptions Disp Refills    Tirzepatide (MOUNJARO) 7.5 MG/0.5ML SOPN SC injection 12 Adjustable Dose Pre-filled Pen Syringe 3     Sig: Inject 0.5 mLs into the skin once a week

## 2024-02-19 DIAGNOSIS — I10 PRIMARY HYPERTENSION: ICD-10-CM

## 2024-02-19 RX ORDER — LISINOPRIL AND HYDROCHLOROTHIAZIDE 25; 20 MG/1; MG/1
1 TABLET ORAL DAILY
Qty: 90 TABLET | Refills: 3 | Status: SHIPPED | OUTPATIENT
Start: 2024-02-19

## 2024-02-19 NOTE — TELEPHONE ENCOUNTER
Received fax from pharmacy requesting refill on pts medication(s). Pt was last seen in office on 1/23/2024  and has a follow up scheduled for 5/21/2024. Will send request to  Dr. Brown  for patient.     Requested Prescriptions     Pending Prescriptions Disp Refills    lisinopril-hydroCHLOROthiazide (PRINZIDE;ZESTORETIC) 20-25 MG per tablet [Pharmacy Med Name: Lisinopril-hydroCHLOROthiazide 20-25 MG Oral Tablet] 90 tablet 0     Sig: Take 1 tablet by mouth once daily

## 2024-04-02 DIAGNOSIS — I10 PRIMARY HYPERTENSION: ICD-10-CM

## 2024-04-03 RX ORDER — AMLODIPINE BESYLATE 5 MG/1
5 TABLET ORAL DAILY
Qty: 90 TABLET | Refills: 3 | Status: SHIPPED | OUTPATIENT
Start: 2024-04-03

## 2024-04-03 NOTE — TELEPHONE ENCOUNTER
Received fax from pharmacy requesting refill on pts medication(s). Pt was last seen in office on 1/23/2024  and has a follow up scheduled for 6/4/2024. Will send request to  Dr. Brown  for authorization.     Requested Prescriptions     Pending Prescriptions Disp Refills    amLODIPine (NORVASC) 5 MG tablet 90 tablet 3     Sig: Take 1 tablet by mouth daily

## 2024-04-26 ENCOUNTER — PATIENT MESSAGE (OUTPATIENT)
Dept: FAMILY MEDICINE CLINIC | Age: 49
End: 2024-04-26

## 2024-04-26 DIAGNOSIS — E11.9 TYPE 2 DIABETES MELLITUS WITHOUT COMPLICATION, WITHOUT LONG-TERM CURRENT USE OF INSULIN (HCC): ICD-10-CM

## 2024-04-26 RX ORDER — TIRZEPATIDE 10 MG/.5ML
10 INJECTION, SOLUTION SUBCUTANEOUS WEEKLY
Qty: 4 ADJUSTABLE DOSE PRE-FILLED PEN SYRINGE | Refills: 11 | Status: SHIPPED | OUTPATIENT
Start: 2024-04-26

## 2024-04-26 RX ORDER — DULAGLUTIDE 1.5 MG/.5ML
1.5 INJECTION, SOLUTION SUBCUTANEOUS WEEKLY
Qty: 12 ADJUSTABLE DOSE PRE-FILLED PEN SYRINGE | Refills: 3 | Status: CANCELLED | OUTPATIENT
Start: 2024-04-26

## 2024-04-26 NOTE — TELEPHONE ENCOUNTER
From: Carl Harper  To: Dr. WINNIE Brown  Sent: 4/26/2024 11:46 AM CDT  Subject: Mounjaro    I am out of my Mounjaro prescription. My pharmacy states that it is out of stock and they cannot fill it at this time. What am I supposed to do? I was put on Mounjaro because I couldn't get Trulicity either.

## 2024-04-26 NOTE — TELEPHONE ENCOUNTER
Called [facility] that he uses. They only have Mounjaro 10-1 box, and 15mg and only 1 box of trulicity 1.5, 2 boxes of the .75 trulicity. None of the 3 or 4.5

## 2024-05-09 DIAGNOSIS — E11.9 TYPE 2 DIABETES MELLITUS WITHOUT COMPLICATION, WITHOUT LONG-TERM CURRENT USE OF INSULIN (HCC): ICD-10-CM

## 2024-05-09 DIAGNOSIS — E78.2 MIXED HYPERLIPIDEMIA: ICD-10-CM

## 2024-05-09 RX ORDER — ATORVASTATIN CALCIUM 40 MG/1
40 TABLET, FILM COATED ORAL DAILY
Qty: 90 TABLET | Refills: 3 | OUTPATIENT
Start: 2024-05-09

## 2024-05-13 DIAGNOSIS — E78.2 MIXED HYPERLIPIDEMIA: ICD-10-CM

## 2024-05-13 DIAGNOSIS — E11.9 TYPE 2 DIABETES MELLITUS WITHOUT COMPLICATION, WITHOUT LONG-TERM CURRENT USE OF INSULIN (HCC): ICD-10-CM

## 2024-05-13 RX ORDER — ATORVASTATIN CALCIUM 40 MG/1
40 TABLET, FILM COATED ORAL DAILY
Qty: 60 TABLET | Refills: 0 | OUTPATIENT
Start: 2024-05-13

## 2024-05-13 RX ORDER — EMPAGLIFLOZIN 25 MG/1
25 TABLET, FILM COATED ORAL DAILY
Qty: 60 TABLET | Refills: 0 | OUTPATIENT
Start: 2024-05-13

## 2024-05-29 DIAGNOSIS — E11.9 TYPE 2 DIABETES MELLITUS WITHOUT COMPLICATION, WITHOUT LONG-TERM CURRENT USE OF INSULIN (HCC): ICD-10-CM

## 2024-05-29 DIAGNOSIS — E78.2 MIXED HYPERLIPIDEMIA: ICD-10-CM

## 2024-05-30 RX ORDER — EMPAGLIFLOZIN 25 MG/1
25 TABLET, FILM COATED ORAL DAILY
Qty: 60 TABLET | Refills: 0 | Status: SHIPPED | OUTPATIENT
Start: 2024-05-30

## 2024-05-30 RX ORDER — ATORVASTATIN CALCIUM 40 MG/1
40 TABLET, FILM COATED ORAL DAILY
Qty: 60 TABLET | Refills: 0 | Status: SHIPPED | OUTPATIENT
Start: 2024-05-30

## 2024-05-30 NOTE — TELEPHONE ENCOUNTER
Received fax from pharmacy requesting refill on pts medication(s). Pt was last seen in office on 1/23/2024  and has a follow up scheduled for 6/4/2024. Will send request to  Dr. Brown  for authorization.     Requested Prescriptions     Pending Prescriptions Disp Refills    atorvastatin (LIPITOR) 40 MG tablet [Pharmacy Med Name: Atorvastatin Calcium 40 MG Oral Tablet] 60 tablet 0     Sig: Take 1 tablet by mouth once daily    JARDIANCE 25 MG tablet [Pharmacy Med Name: Jardiance 25 MG Oral Tablet] 60 tablet 0     Sig: Take 1 tablet by mouth once daily

## 2024-06-04 ENCOUNTER — OFFICE VISIT (OUTPATIENT)
Dept: FAMILY MEDICINE CLINIC | Age: 49
End: 2024-06-04
Payer: COMMERCIAL

## 2024-06-04 VITALS
BODY MASS INDEX: 41.35 KG/M2 | HEIGHT: 73 IN | DIASTOLIC BLOOD PRESSURE: 76 MMHG | TEMPERATURE: 98 F | HEART RATE: 70 BPM | SYSTOLIC BLOOD PRESSURE: 128 MMHG | OXYGEN SATURATION: 96 % | WEIGHT: 312 LBS

## 2024-06-04 DIAGNOSIS — I10 PRIMARY HYPERTENSION: ICD-10-CM

## 2024-06-04 DIAGNOSIS — Z12.5 ENCOUNTER FOR PROSTATE CANCER SCREENING: ICD-10-CM

## 2024-06-04 DIAGNOSIS — E11.9 TYPE 2 DIABETES MELLITUS WITHOUT COMPLICATION, WITHOUT LONG-TERM CURRENT USE OF INSULIN (HCC): Primary | ICD-10-CM

## 2024-06-04 DIAGNOSIS — M15.9 PRIMARY OSTEOARTHRITIS INVOLVING MULTIPLE JOINTS: ICD-10-CM

## 2024-06-04 DIAGNOSIS — E78.2 MIXED HYPERLIPIDEMIA: ICD-10-CM

## 2024-06-04 DIAGNOSIS — G47.33 OBSTRUCTIVE SLEEP APNEA ON CPAP: ICD-10-CM

## 2024-06-04 PROCEDURE — 3074F SYST BP LT 130 MM HG: CPT | Performed by: PEDIATRICS

## 2024-06-04 PROCEDURE — 3078F DIAST BP <80 MM HG: CPT | Performed by: PEDIATRICS

## 2024-06-04 PROCEDURE — 99396 PREV VISIT EST AGE 40-64: CPT | Performed by: PEDIATRICS

## 2024-06-04 RX ORDER — TIRZEPATIDE 10 MG/.5ML
10 INJECTION, SOLUTION SUBCUTANEOUS WEEKLY
Qty: 12 ADJUSTABLE DOSE PRE-FILLED PEN SYRINGE | Refills: 3 | Status: SHIPPED | OUTPATIENT
Start: 2024-06-04

## 2024-06-04 RX ORDER — ATORVASTATIN CALCIUM 40 MG/1
40 TABLET, FILM COATED ORAL DAILY
Qty: 90 TABLET | Refills: 3 | Status: SHIPPED | OUTPATIENT
Start: 2024-06-04

## 2024-06-04 SDOH — ECONOMIC STABILITY: FOOD INSECURITY: WITHIN THE PAST 12 MONTHS, THE FOOD YOU BOUGHT JUST DIDN'T LAST AND YOU DIDN'T HAVE MONEY TO GET MORE.: NEVER TRUE

## 2024-06-04 SDOH — ECONOMIC STABILITY: FOOD INSECURITY: WITHIN THE PAST 12 MONTHS, YOU WORRIED THAT YOUR FOOD WOULD RUN OUT BEFORE YOU GOT MONEY TO BUY MORE.: NEVER TRUE

## 2024-06-04 SDOH — ECONOMIC STABILITY: INCOME INSECURITY: HOW HARD IS IT FOR YOU TO PAY FOR THE VERY BASICS LIKE FOOD, HOUSING, MEDICAL CARE, AND HEATING?: NOT HARD AT ALL

## 2024-06-04 ASSESSMENT — PATIENT HEALTH QUESTIONNAIRE - PHQ9
SUM OF ALL RESPONSES TO PHQ QUESTIONS 1-9: 0
2. FEELING DOWN, DEPRESSED OR HOPELESS: NOT AT ALL
SUM OF ALL RESPONSES TO PHQ QUESTIONS 1-9: 0
1. LITTLE INTEREST OR PLEASURE IN DOING THINGS: NOT AT ALL
SUM OF ALL RESPONSES TO PHQ QUESTIONS 1-9: 0
SUM OF ALL RESPONSES TO PHQ9 QUESTIONS 1 & 2: 0
SUM OF ALL RESPONSES TO PHQ QUESTIONS 1-9: 0

## 2024-06-04 ASSESSMENT — ENCOUNTER SYMPTOMS
WHEEZING: 0
BACK PAIN: 0
CHEST TIGHTNESS: 0
SORE THROAT: 0
VOMITING: 0
NAUSEA: 0
COUGH: 0
DIARRHEA: 0
ABDOMINAL PAIN: 0
SHORTNESS OF BREATH: 0

## 2024-06-04 NOTE — PROGRESS NOTES
frequency and urgency.   Musculoskeletal:  Positive for arthralgias (knees are better after shots.). Negative for back pain and neck pain.   Skin:  Negative for rash.   Neurological:  Negative for dizziness and headaches.   Psychiatric/Behavioral:  Negative for self-injury. The patient is not nervous/anxious.      Eyes: y ([facility])  Dentist:  y  Skin:  n  Labs:  due today  PSA: due  Nocturia:  not typically  Stream: normal  ED:  occasionally  Colonoscopy:  No colonoscopy on file   Date of last Cologuard: 9/17/2023  No FIT/FOBT on file   No flexible sigmoidoscopy on file  Exercise: He goes to the gym  Diet and Nut:   no special  MVI:  no  Supplements:  no  Asa: no but recommended  Depression:  no  Body Image: would like to lose weight  Fall:  n  EOL:  discussed  Tobacco: n   Substance: n  Immunization:  utd  Sleep: some issues staying asleep  Cognition: intact    Health Maintenance: as above.       Physical Exam  Constitutional:       General: He is not in acute distress.     Appearance: Normal appearance. He is well-developed. He is obese.   HENT:      Head: Normocephalic and atraumatic.      Right Ear: Tympanic membrane, ear canal and external ear normal.      Left Ear: Tympanic membrane, ear canal and external ear normal.      Nose: Nose normal.      Mouth/Throat:      Mouth: Mucous membranes are moist.      Pharynx: Oropharynx is clear.   Eyes:      General: No scleral icterus.     Extraocular Movements: Extraocular movements intact.      Conjunctiva/sclera: Conjunctivae normal.      Pupils: Pupils are equal, round, and reactive to light.   Neck:      Thyroid: No thyromegaly.      Vascular: No carotid bruit or JVD.   Cardiovascular:      Rate and Rhythm: Normal rate and regular rhythm. No extrasystoles are present.     Chest Wall: PMI is not displaced.      Pulses: Normal pulses.      Heart sounds: Normal heart sounds, S1 normal and S2 normal. No murmur heard.     No friction rub. No gallop.

## 2024-08-15 DIAGNOSIS — M17.11 PRIMARY OSTEOARTHRITIS OF RIGHT KNEE: ICD-10-CM

## 2024-08-15 DIAGNOSIS — M17.12 PRIMARY OSTEOARTHRITIS OF LEFT KNEE: ICD-10-CM

## 2024-08-15 DIAGNOSIS — Z00.00 VISIT FOR PREVENTIVE HEALTH EXAMINATION: ICD-10-CM

## 2024-08-15 RX ORDER — MELOXICAM 15 MG/1
15 TABLET ORAL DAILY
Qty: 90 TABLET | Refills: 3 | Status: SHIPPED | OUTPATIENT
Start: 2024-08-15

## 2024-09-28 DIAGNOSIS — E11.9 TYPE 2 DIABETES MELLITUS WITHOUT COMPLICATION, WITHOUT LONG-TERM CURRENT USE OF INSULIN (HCC): ICD-10-CM

## 2024-09-30 NOTE — TELEPHONE ENCOUNTER
Received fax from pharmacy requesting refill on pts medication(s). Pt was last seen in office on 6/4/2024  and has a follow up scheduled for 10/7/2024. Will send request to  Dr. Brown  for authorization.     Requested Prescriptions     Pending Prescriptions Disp Refills    metFORMIN (GLUCOPHAGE) 500 MG tablet [Pharmacy Med Name: metFORMIN HCl 500 MG Oral Tablet] 90 tablet 3     Sig: TAKE 1 TABLET BY MOUTH ONCE DAILY WITH SUPPER

## 2024-10-07 ENCOUNTER — OFFICE VISIT (OUTPATIENT)
Dept: FAMILY MEDICINE CLINIC | Age: 49
End: 2024-10-07
Payer: COMMERCIAL

## 2024-10-07 VITALS
OXYGEN SATURATION: 97 % | HEART RATE: 70 BPM | TEMPERATURE: 96.9 F | WEIGHT: 302 LBS | BODY MASS INDEX: 40.02 KG/M2 | DIASTOLIC BLOOD PRESSURE: 66 MMHG | SYSTOLIC BLOOD PRESSURE: 116 MMHG | HEIGHT: 73 IN

## 2024-10-07 DIAGNOSIS — M15.0 PRIMARY OSTEOARTHRITIS INVOLVING MULTIPLE JOINTS: ICD-10-CM

## 2024-10-07 DIAGNOSIS — Z00.00 VISIT FOR PREVENTIVE HEALTH EXAMINATION: ICD-10-CM

## 2024-10-07 DIAGNOSIS — E11.9 TYPE 2 DIABETES MELLITUS WITHOUT COMPLICATION, WITHOUT LONG-TERM CURRENT USE OF INSULIN (HCC): ICD-10-CM

## 2024-10-07 DIAGNOSIS — E78.2 MIXED HYPERLIPIDEMIA: ICD-10-CM

## 2024-10-07 DIAGNOSIS — I10 PRIMARY HYPERTENSION: ICD-10-CM

## 2024-10-07 DIAGNOSIS — G47.33 OBSTRUCTIVE SLEEP APNEA ON CPAP: ICD-10-CM

## 2024-10-07 DIAGNOSIS — E11.9 TYPE 2 DIABETES MELLITUS WITHOUT COMPLICATION, WITHOUT LONG-TERM CURRENT USE OF INSULIN (HCC): Primary | ICD-10-CM

## 2024-10-07 DIAGNOSIS — Z12.5 ENCOUNTER FOR PROSTATE CANCER SCREENING: ICD-10-CM

## 2024-10-07 DIAGNOSIS — M17.11 PRIMARY OSTEOARTHRITIS OF RIGHT KNEE: ICD-10-CM

## 2024-10-07 DIAGNOSIS — M17.12 PRIMARY OSTEOARTHRITIS OF LEFT KNEE: ICD-10-CM

## 2024-10-07 LAB
ALBUMIN SERPL-MCNC: 4.2 G/DL (ref 3.5–5.2)
ALP SERPL-CCNC: 135 U/L (ref 40–129)
ALT SERPL-CCNC: 23 U/L (ref 5–41)
ANION GAP SERPL CALCULATED.3IONS-SCNC: 13 MMOL/L (ref 7–19)
AST SERPL-CCNC: 32 U/L (ref 5–40)
BASOPHILS # BLD: 0.1 K/UL (ref 0–0.2)
BASOPHILS NFR BLD: 0.9 % (ref 0–1)
BILIRUB SERPL-MCNC: 0.6 MG/DL (ref 0.2–1.2)
BUN SERPL-MCNC: 13 MG/DL (ref 6–20)
CALCIUM SERPL-MCNC: 9.2 MG/DL (ref 8.6–10)
CHLORIDE SERPL-SCNC: 102 MMOL/L (ref 98–111)
CHOLEST SERPL-MCNC: 136 MG/DL (ref 0–199)
CO2 SERPL-SCNC: 24 MMOL/L (ref 22–29)
CREAT SERPL-MCNC: 0.8 MG/DL (ref 0.7–1.2)
CREAT UR-MCNC: 126.8 MG/DL (ref 39–259)
EOSINOPHIL # BLD: 0.3 K/UL (ref 0–0.6)
EOSINOPHIL NFR BLD: 3.5 % (ref 0–5)
ERYTHROCYTE [DISTWIDTH] IN BLOOD BY AUTOMATED COUNT: 13.2 % (ref 11.5–14.5)
GLUCOSE SERPL-MCNC: 102 MG/DL (ref 70–99)
HBA1C MFR BLD: 5.8 % (ref 4–5.6)
HCT VFR BLD AUTO: 50.6 % (ref 42–52)
HDLC SERPL-MCNC: 35 MG/DL (ref 40–60)
HGB BLD-MCNC: 16.2 G/DL (ref 14–18)
IMM GRANULOCYTES # BLD: 0 K/UL
LDLC SERPL CALC-MCNC: 70 MG/DL
LYMPHOCYTES # BLD: 1.8 K/UL (ref 1.1–4.5)
LYMPHOCYTES NFR BLD: 20.4 % (ref 20–40)
MCH RBC QN AUTO: 31 PG (ref 27–31)
MCHC RBC AUTO-ENTMCNC: 32 G/DL (ref 33–37)
MCV RBC AUTO: 96.9 FL (ref 80–94)
MICROALBUMIN UR-MCNC: <1.2 MG/DL (ref 0–1.99)
MICROALBUMIN/CREAT UR-RTO: NORMAL MG/G
MONOCYTES # BLD: 0.6 K/UL (ref 0–0.9)
MONOCYTES NFR BLD: 7 % (ref 0–10)
NEUTROPHILS # BLD: 6.1 K/UL (ref 1.5–7.5)
NEUTS SEG NFR BLD: 67.8 % (ref 50–65)
PLATELET # BLD AUTO: 301 K/UL (ref 130–400)
PMV BLD AUTO: 9.8 FL (ref 9.4–12.4)
POTASSIUM SERPL-SCNC: 3.4 MMOL/L (ref 3.5–5)
PROT SERPL-MCNC: 7.3 G/DL (ref 6.4–8.3)
PSA SERPL-MCNC: 0.47 NG/ML (ref 0–4)
RBC # BLD AUTO: 5.22 M/UL (ref 4.7–6.1)
SODIUM SERPL-SCNC: 139 MMOL/L (ref 136–145)
TRIGL SERPL-MCNC: 155 MG/DL (ref 0–149)
TSH SERPL DL<=0.005 MIU/L-ACNC: 1.17 UIU/ML (ref 0.27–4.2)
WBC # BLD AUTO: 9 K/UL (ref 4.8–10.8)

## 2024-10-07 PROCEDURE — 99214 OFFICE O/P EST MOD 30 MIN: CPT | Performed by: PEDIATRICS

## 2024-10-07 PROCEDURE — 3074F SYST BP LT 130 MM HG: CPT | Performed by: PEDIATRICS

## 2024-10-07 PROCEDURE — 3078F DIAST BP <80 MM HG: CPT | Performed by: PEDIATRICS

## 2024-10-07 PROCEDURE — 3044F HG A1C LEVEL LT 7.0%: CPT | Performed by: PEDIATRICS

## 2024-10-07 RX ORDER — MELOXICAM 15 MG/1
15 TABLET ORAL DAILY
Qty: 90 TABLET | Refills: 3 | Status: SHIPPED | OUTPATIENT
Start: 2024-10-07

## 2024-10-07 SDOH — ECONOMIC STABILITY: FOOD INSECURITY: WITHIN THE PAST 12 MONTHS, YOU WORRIED THAT YOUR FOOD WOULD RUN OUT BEFORE YOU GOT MONEY TO BUY MORE.: NEVER TRUE

## 2024-10-07 SDOH — ECONOMIC STABILITY: FOOD INSECURITY: WITHIN THE PAST 12 MONTHS, THE FOOD YOU BOUGHT JUST DIDN'T LAST AND YOU DIDN'T HAVE MONEY TO GET MORE.: NEVER TRUE

## 2024-10-07 SDOH — ECONOMIC STABILITY: INCOME INSECURITY: HOW HARD IS IT FOR YOU TO PAY FOR THE VERY BASICS LIKE FOOD, HOUSING, MEDICAL CARE, AND HEATING?: NOT HARD AT ALL

## 2024-10-07 ASSESSMENT — ENCOUNTER SYMPTOMS
DIARRHEA: 0
SHORTNESS OF BREATH: 0
ABDOMINAL PAIN: 0
NAUSEA: 0
COUGH: 0
CHEST TIGHTNESS: 0
SORE THROAT: 0
VOMITING: 0
BACK PAIN: 0
WHEEZING: 0

## 2024-10-07 NOTE — PROGRESS NOTES
[address] [place] [address]  Phone [phone]   Fax [phone]      OFFICE VISIT: 10/7/2024    Carl Harper-: 1975      HPI  Reason For Visit:  Carl is a 49 y.o.     Follow-up (4 month follow up for diabetes. Patient denies any new issues or concerns.) and Health Maintenance (1. Diabetic Foot Exam 2. Diabetic Retinal Exam - has not been in the last year.)    Patient presents on follow-up for multiple health issues.  Present concerns:  No new concerns.      Diabetes Mellitus Type 2  Diet compliance:  he is trying to be compliant  Nutrition Consultation Needed:  no  Medication:              Jardiance 25 mg daily               Mounjaro 10 mg subcu weekly               Metformin 500 mg daily with meal  Medication compliance:  compliant most of the time  Weight trend: down 10 lb in the past 4 months  Current exercise: yes -but limited due to his arthritic knee pain  Checking: occasionally  Home blood sugar records: fasting range: controlled when he checks  Low BG:  no  Eye exam current (within one year): yes  Checking Feet regularly:  yes - no sores  ACE/ARB:  yes - lisinopril  Tobacco history: He  reports that he quit smoking about 20 years ago. His smoking use included cigarettes. He started smoking about 30 years ago. He has a 5 pack-year smoking history. He has quit using smokeless tobacco.    Lab Results   Component Value Date    LABA1C 5.8 2024    LABA1C 5.7 2023    LABA1C 5.8 2022     Lab Results   Component Value Date    CREATININE 0.8 2024     Diabetic foot exam was performed on 2024      Hypertension:   BP today was   BP Readings from Last 1 Encounters:   10/07/24 116/66      Recent BP readings:    BP Readings from Last 3 Encounters:   10/07/24 116/66   24 128/76   24 120/72     Medication              Amlodipine 5 mg daily              Lisinopril-hydrochlorothiazide 20-25 mg daily              Jardiance 25 mg daily

## 2024-10-08 ENCOUNTER — TELEPHONE (OUTPATIENT)
Dept: FAMILY MEDICINE CLINIC | Age: 49
End: 2024-10-08

## 2024-10-08 NOTE — TELEPHONE ENCOUNTER
----- Message from Dr. WINNIE Brown DO sent at 10/8/2024  2:24 PM CDT -----  Hemoglobin A1c is 5.8 which indicates excellent blood sugar control over the past 3 months.  CMP is normal other than potassium being slightly low.  Recommend increasing potassium rich foods.  This should be sufficient to get potassium back up in the normal range.  Alkaline phosphatase is mildly elevated.  Recommend calcium and vitamin D supplementation  Lipids are excellently controlled.  Your WBC, (infection fighting ability) Hgb and Hct, (oxygen carrying cells) are normal; as is your percentage of each cell type.  There is no significant protein excretion in the urine.  Thyroid is normal.  PSA is normal at 0.47

## 2024-12-29 ENCOUNTER — PATIENT MESSAGE (OUTPATIENT)
Dept: FAMILY MEDICINE CLINIC | Age: 49
End: 2024-12-29

## 2024-12-29 DIAGNOSIS — J34.89 STUFFY AND RUNNY NOSE: ICD-10-CM

## 2024-12-29 DIAGNOSIS — R06.7 SNEEZING: ICD-10-CM

## 2024-12-29 DIAGNOSIS — R05.1 ACUTE COUGH: Primary | ICD-10-CM

## 2025-01-07 DIAGNOSIS — I10 PRIMARY HYPERTENSION: ICD-10-CM

## 2025-01-07 NOTE — TELEPHONE ENCOUNTER
Received fax from pharmacy requesting refill on pts medication(s). Pt was last seen in office on 10/07/2024 and has a follow up scheduled for 4/1/2025. Will send request to  Dr. Brown  for patient.     Requested Prescriptions     Pending Prescriptions Disp Refills    amLODIPine (NORVASC) 5 MG tablet 90 tablet 3     Sig: Take 1 tablet by mouth daily

## 2025-01-08 RX ORDER — AMLODIPINE BESYLATE 5 MG/1
5 TABLET ORAL DAILY
Qty: 90 TABLET | Refills: 3 | Status: SHIPPED | OUTPATIENT
Start: 2025-01-08

## 2025-01-08 NOTE — TELEPHONE ENCOUNTER
Sent to pharmacy per provider    Requested Prescriptions     Signed Prescriptions Disp Refills    amLODIPine (NORVASC) 5 MG tablet 90 tablet 3     Sig: Take 1 tablet by mouth daily     Authorizing Provider: WINNIE MADDEN     Ordering User: KARY DEWITT

## 2025-04-01 ENCOUNTER — OFFICE VISIT (OUTPATIENT)
Age: 50
End: 2025-04-01

## 2025-04-01 VITALS
DIASTOLIC BLOOD PRESSURE: 72 MMHG | TEMPERATURE: 97.8 F | WEIGHT: 310 LBS | HEIGHT: 73 IN | SYSTOLIC BLOOD PRESSURE: 120 MMHG | BODY MASS INDEX: 41.08 KG/M2 | OXYGEN SATURATION: 96 % | HEART RATE: 82 BPM

## 2025-04-01 DIAGNOSIS — G89.29 CHRONIC PAIN OF LEFT KNEE: ICD-10-CM

## 2025-04-01 DIAGNOSIS — E78.2 MIXED HYPERLIPIDEMIA: ICD-10-CM

## 2025-04-01 DIAGNOSIS — E11.9 TYPE 2 DIABETES MELLITUS WITHOUT COMPLICATION, WITHOUT LONG-TERM CURRENT USE OF INSULIN: Primary | ICD-10-CM

## 2025-04-01 DIAGNOSIS — R53.83 FATIGUE, UNSPECIFIED TYPE: ICD-10-CM

## 2025-04-01 DIAGNOSIS — Z00.00 ENCOUNTER FOR WELL ADULT EXAM WITHOUT ABNORMAL FINDINGS: ICD-10-CM

## 2025-04-01 DIAGNOSIS — M17.12 PRIMARY OSTEOARTHRITIS OF LEFT KNEE: ICD-10-CM

## 2025-04-01 DIAGNOSIS — I10 PRIMARY HYPERTENSION: ICD-10-CM

## 2025-04-01 DIAGNOSIS — G47.33 OBSTRUCTIVE SLEEP APNEA ON CPAP: ICD-10-CM

## 2025-04-01 DIAGNOSIS — M25.562 CHRONIC PAIN OF LEFT KNEE: ICD-10-CM

## 2025-04-01 DIAGNOSIS — M15.0 PRIMARY OSTEOARTHRITIS INVOLVING MULTIPLE JOINTS: ICD-10-CM

## 2025-04-01 RX ORDER — TRIAMCINOLONE ACETONIDE 40 MG/ML
40 INJECTION, SUSPENSION INTRA-ARTICULAR; INTRAMUSCULAR ONCE
Status: COMPLETED | OUTPATIENT
Start: 2025-04-01 | End: 2025-04-01

## 2025-04-01 RX ORDER — METHYLPREDNISOLONE ACETATE 40 MG/ML
40 INJECTION, SUSPENSION INTRA-ARTICULAR; INTRALESIONAL; INTRAMUSCULAR; SOFT TISSUE ONCE
Status: COMPLETED | OUTPATIENT
Start: 2025-04-01 | End: 2025-04-01

## 2025-04-01 RX ADMIN — METHYLPREDNISOLONE ACETATE 40 MG: 40 INJECTION, SUSPENSION INTRA-ARTICULAR; INTRALESIONAL; INTRAMUSCULAR; SOFT TISSUE at 15:10

## 2025-04-01 RX ADMIN — TRIAMCINOLONE ACETONIDE 40 MG: 40 INJECTION, SUSPENSION INTRA-ARTICULAR; INTRAMUSCULAR at 15:11

## 2025-04-01 SDOH — ECONOMIC STABILITY: FOOD INSECURITY: WITHIN THE PAST 12 MONTHS, YOU WORRIED THAT YOUR FOOD WOULD RUN OUT BEFORE YOU GOT MONEY TO BUY MORE.: NEVER TRUE

## 2025-04-01 SDOH — ECONOMIC STABILITY: FOOD INSECURITY: WITHIN THE PAST 12 MONTHS, THE FOOD YOU BOUGHT JUST DIDN'T LAST AND YOU DIDN'T HAVE MONEY TO GET MORE.: NEVER TRUE

## 2025-04-01 ASSESSMENT — PATIENT HEALTH QUESTIONNAIRE - PHQ9
SUM OF ALL RESPONSES TO PHQ QUESTIONS 1-9: 0
SUM OF ALL RESPONSES TO PHQ QUESTIONS 1-9: 0
2. FEELING DOWN, DEPRESSED OR HOPELESS: NOT AT ALL
2. FEELING DOWN, DEPRESSED OR HOPELESS: NOT AT ALL
1. LITTLE INTEREST OR PLEASURE IN DOING THINGS: NOT AT ALL
SUM OF ALL RESPONSES TO PHQ QUESTIONS 1-9: 0
1. LITTLE INTEREST OR PLEASURE IN DOING THINGS: NOT AT ALL
SUM OF ALL RESPONSES TO PHQ9 QUESTIONS 1 & 2: 0
SUM OF ALL RESPONSES TO PHQ QUESTIONS 1-9: 0

## 2025-04-01 ASSESSMENT — ENCOUNTER SYMPTOMS
DIARRHEA: 0
NAUSEA: 0
ABDOMINAL PAIN: 0
WHEEZING: 0
COUGH: 0
SORE THROAT: 0
SHORTNESS OF BREATH: 0
CHEST TIGHTNESS: 0
BACK PAIN: 1
VOMITING: 0

## 2025-04-01 NOTE — PROGRESS NOTES
Carl Harper (:  1975) is a 49 y.o. male, Established patient, here for evaluation of the following chief complaint(s):  Follow-up (1. Per patient back and left knee bothering him - req a shot )       Assessment & Plan  1. Health maintenance.  - Due for blood work today as no lab work in over 6 months.  - Compliant with yearly eye exams and regularly checks feet for sores.  - Non-smoker and does not take aspirin currently.    2. Left knee pain: Likely due to arthritis or a ligament issue.  - Administer steroid injection today.  - Consider Synvisc injections if steroid injections cease to be effective.  - Continue meloxicam 15 mg daily for arthritis pain.    3. Low back pain: Likely due to a muscle strain or inflammation around the paraspinal muscle.  - Continue meloxicam 15 mg daily.  - May take ibuprofen or Aleve for additional relief if needed.  - X-rays not necessary at this time as pain is expected to improve within a few days.    4. Type 2 diabetes mellitus: Well-controlled. Hemoglobin A1c 5.8 on 10/07/2024.  - Compliant with medication regimen: Jardiance 25 mg daily, metformin 500 mg daily with evening meal, and Mounjaro 10 mg subcutaneously weekly.  - Continue current medications and dietary efforts.  - Consider increasing Mounjaro dosage to 12.5 mg to aid in weight loss.    5. Hypertension: Well-managed. Blood pressure 120/72, heart rate 82. BUN 13, creatinine 0.8, estimated GFR >90.  - Compliant with medication regimen: amlodipine 5 mg daily, lisinopril-hydrochlorothiazide 20-25 mg daily, and Jardiance 25 mg daily.  - Continue current medications.  - Monitor blood pressure at home.    6. Hyperlipidemia: Well-controlled. Total cholesterol 136, HDL 35, LDL 70.  - Compliant with medication regimen: atorvastatin 40 mg nightly.  - Continue current medications and dietary efforts.    7. Osteoarthritis.  - Continue meloxicam 15 mg daily.  - May use ibuprofen or Aleve for additional relief if

## 2025-04-25 DIAGNOSIS — E11.9 TYPE 2 DIABETES MELLITUS WITHOUT COMPLICATION, WITHOUT LONG-TERM CURRENT USE OF INSULIN (HCC): ICD-10-CM

## 2025-04-25 NOTE — TELEPHONE ENCOUNTER
Received fax from pharmacy requesting refill on pts medication(s). Pt was last seen in office on 4/1/2025  and has a follow up scheduled for 9/30/2025. Will send request to  Dr. Brown  for authorization.     Requested Prescriptions     Pending Prescriptions Disp Refills    MOUNJARO 12.5 MG/0.5ML SOAJ [Pharmacy Med Name: Mounjaro 12.5 MG/0.5ML Subcutaneous Solution Pen-injector] 4 mL 0     Sig: INJECT 1 SYRINGE SUBCUTANEOUSLY ONCE A WEEK

## 2025-04-28 RX ORDER — TIRZEPATIDE 12.5 MG/.5ML
INJECTION, SOLUTION SUBCUTANEOUS
Qty: 4 ML | Refills: 0 | Status: SHIPPED | OUTPATIENT
Start: 2025-04-28

## 2025-06-06 DIAGNOSIS — E11.9 TYPE 2 DIABETES MELLITUS WITHOUT COMPLICATION, WITHOUT LONG-TERM CURRENT USE OF INSULIN (HCC): ICD-10-CM

## 2025-06-06 NOTE — TELEPHONE ENCOUNTER
Received fax from pharmacy requesting refill on pts medication(s). Pt was last seen in office on 4/1/2025  and has a follow up scheduled for 9/30/2025. Will send request to  Dr. Brown  for authorization.     Requested Prescriptions     Pending Prescriptions Disp Refills    MOUNJARO 10 MG/0.5ML SOAJ pen [Pharmacy Med Name: Mounjaro 10 MG/0.5ML Subcutaneous Solution Pen-injector] 12 mL 0     Sig: INJECT 1 SYRINGE SUBCUTANEOUSLY ONCE A WEEK

## 2025-06-07 RX ORDER — TIRZEPATIDE 10 MG/.5ML
INJECTION, SOLUTION SUBCUTANEOUS
Qty: 12 ML | Refills: 0 | Status: SHIPPED | OUTPATIENT
Start: 2025-06-07

## 2025-06-12 DIAGNOSIS — E78.2 MIXED HYPERLIPIDEMIA: ICD-10-CM

## 2025-06-12 RX ORDER — ATORVASTATIN CALCIUM 40 MG/1
40 TABLET, FILM COATED ORAL DAILY
Qty: 90 TABLET | Refills: 0 | Status: SHIPPED | OUTPATIENT
Start: 2025-06-12

## 2025-06-12 NOTE — TELEPHONE ENCOUNTER
Received refill request from pts pharmacy on pended medication below. Will send to provider for authorization  Last appt: 4/1/2025  Next appt: 9/30/2025    Requested Prescriptions     Pending Prescriptions Disp Refills    atorvastatin (LIPITOR) 40 MG tablet [Pharmacy Med Name: Atorvastatin Calcium 40 MG Oral Tablet] 90 tablet 0     Sig: Take 1 tablet by mouth once daily

## 2025-06-13 DIAGNOSIS — E11.9 TYPE 2 DIABETES MELLITUS WITHOUT COMPLICATION, WITHOUT LONG-TERM CURRENT USE OF INSULIN (HCC): ICD-10-CM

## 2025-06-16 RX ORDER — TIRZEPATIDE 12.5 MG/.5ML
INJECTION, SOLUTION SUBCUTANEOUS
Qty: 8 ML | Refills: 0 | Status: SHIPPED | OUTPATIENT
Start: 2025-06-16

## 2025-06-16 NOTE — TELEPHONE ENCOUNTER
Received fax from pharmacy requesting refill on pts medication(s). Pt was last seen in office on 4/1/2025  and has a follow up scheduled for 9/30/2025. Will send request to  Dr. Brown  for patient.     Requested Prescriptions     Pending Prescriptions Disp Refills    MOUNJARO 12.5 MG/0.5ML SOAJ injection [Pharmacy Med Name: Mounjaro 12.5 MG/0.5ML Subcutaneous Solution Pen-injector] 8 mL 0     Sig: INJECT 1 SYRINGE SUBCUTANEOUSLY ONCE A WEEK

## 2025-06-28 DIAGNOSIS — I10 PRIMARY HYPERTENSION: ICD-10-CM

## 2025-06-30 RX ORDER — LISINOPRIL AND HYDROCHLOROTHIAZIDE 20; 25 MG/1; MG/1
1 TABLET ORAL DAILY
Qty: 90 TABLET | Refills: 0 | Status: SHIPPED | OUTPATIENT
Start: 2025-06-30

## 2025-06-30 NOTE — TELEPHONE ENCOUNTER
Received fax from pharmacy requesting refill on pts medication(s). Pt was last seen in office on 4/1/2025  and has a follow up scheduled for 9/30/2025. Will send request to  Dr. Brown  for authorization.     Requested Prescriptions     Pending Prescriptions Disp Refills    lisinopril-hydroCHLOROthiazide (PRINZIDE;ZESTORETIC) 20-25 MG per tablet [Pharmacy Med Name: Lisinopril-hydroCHLOROthiazide 20-25 MG Oral Tablet] 90 tablet 0     Sig: Take 1 tablet by mouth once daily

## 2025-07-18 DIAGNOSIS — E11.9 TYPE 2 DIABETES MELLITUS WITHOUT COMPLICATION, WITHOUT LONG-TERM CURRENT USE OF INSULIN (HCC): ICD-10-CM

## 2025-07-18 RX ORDER — EMPAGLIFLOZIN 25 MG/1
25 TABLET, FILM COATED ORAL DAILY
Qty: 90 TABLET | Refills: 3 | Status: SHIPPED | OUTPATIENT
Start: 2025-07-18

## 2025-07-18 NOTE — TELEPHONE ENCOUNTER
Received fax from pharmacy requesting refill on pts medication(s). Pt was last seen in office on 4/1/2025  and has a follow up scheduled for 9/30/2025. Will send request to  Dr. Brown  for patient.     Requested Prescriptions     Pending Prescriptions Disp Refills    JARDIANCE 25 MG tablet [Pharmacy Med Name: Jardiance 25 MG Oral Tablet] 90 tablet 0     Sig: Take 1 tablet by mouth once daily

## 2025-08-15 DIAGNOSIS — E11.9 TYPE 2 DIABETES MELLITUS WITHOUT COMPLICATION, WITHOUT LONG-TERM CURRENT USE OF INSULIN (HCC): ICD-10-CM

## 2025-08-15 RX ORDER — TIRZEPATIDE 12.5 MG/.5ML
12.5 INJECTION, SOLUTION SUBCUTANEOUS WEEKLY
Qty: 8 ML | Refills: 0 | Status: SHIPPED | OUTPATIENT
Start: 2025-08-15